# Patient Record
Sex: MALE | Race: BLACK OR AFRICAN AMERICAN | NOT HISPANIC OR LATINO | Employment: FULL TIME | URBAN - METROPOLITAN AREA
[De-identification: names, ages, dates, MRNs, and addresses within clinical notes are randomized per-mention and may not be internally consistent; named-entity substitution may affect disease eponyms.]

---

## 2017-02-27 RX ORDER — LISINOPRIL 5 MG/1
TABLET ORAL
Qty: 90 TABLET | Refills: 0 | Status: SHIPPED | OUTPATIENT
Start: 2017-02-27 | End: 2017-04-16 | Stop reason: SDUPTHER

## 2017-03-31 ENCOUNTER — OFFICE VISIT (OUTPATIENT)
Dept: OPTOMETRY | Facility: CLINIC | Age: 82
End: 2017-03-31
Payer: MEDICARE

## 2017-03-31 DIAGNOSIS — H25.13 NUCLEAR SCLEROSIS, BILATERAL: ICD-10-CM

## 2017-03-31 DIAGNOSIS — H40.013 OPEN ANGLE WITH BORDERLINE FINDINGS AND LOW GLAUCOMA RISK IN BOTH EYES: Primary | ICD-10-CM

## 2017-03-31 DIAGNOSIS — H04.123 BILATERAL DRY EYES: ICD-10-CM

## 2017-03-31 PROCEDURE — 92012 INTRM OPH EXAM EST PATIENT: CPT | Mod: S$PBB,,, | Performed by: OPTOMETRIST

## 2017-03-31 PROCEDURE — 92020 GONIOSCOPY: CPT | Mod: S$PBB,,, | Performed by: OPTOMETRIST

## 2017-03-31 PROCEDURE — 99212 OFFICE O/P EST SF 10 MIN: CPT | Mod: PBBFAC,PO | Performed by: OPTOMETRIST

## 2017-03-31 PROCEDURE — 99999 PR PBB SHADOW E&M-EST. PATIENT-LVL II: CPT | Mod: PBBFAC,,, | Performed by: OPTOMETRIST

## 2017-03-31 PROCEDURE — 92020 GONIOSCOPY: CPT | Mod: PBBFAC,PO | Performed by: OPTOMETRIST

## 2017-03-31 NOTE — PROGRESS NOTES
HPI     Mr. Brent Alatorre  Last visiti was 08/15/16 pt returns today for IOP   check  Pt happy with vision  Patient complains of having occasional sharp pains in OS only no pain   recently in the last week     (-)drops  (-)flashes  (+)floaters no recent changes   (-)diplopia    Diabetic no  No results found for: HGBA1C    OCULAR HISTORY  Last Eye Exam 08/02/16 Dr. Haque   (-)eye surgery     +Open angle with borderline findings and low glaucoma risk in both eyes  +Nuclear sclerosis, bilateral  +Bilateral dry eyes         FAMILY HISTORY  (-)Glaucoma none          Last edited by Vikram Haque, OD on 3/31/2017 11:04 AM.     ROS     Negative for: Constitutional, Gastrointestinal, Neurological, Skin,   Genitourinary, Musculoskeletal, HENT, Endocrine, Cardiovascular, Eyes,   Respiratory, Psychiatric, Allergic/Imm, Heme/Lymph    Last edited by Vikram Haque, OD on 3/31/2017  9:45 AM. (History)        Assessment /Plan     For exam results, see Encounter Report.    Open angle with borderline findings and low glaucoma risk in both eyes    Bilateral dry eyes    Nuclear sclerosis, bilateral      1. IOP stable, CD eval and nerve stable, prev vf with scattered defects, OCT with mild thinning os>od. No treat at this time. Watch os, RTC 6 mos with DFe and HVf(24-2)gladis std and OCT of RNFL.   2. Recommend artificial tears. 1 drop 1-2x per day. Chronicity of disease and treatment discussed.  3. Educated pt on presence of cataracts and effects on vision. No surgery at this time. Recheck in one year.

## 2017-04-18 RX ORDER — LISINOPRIL 5 MG/1
TABLET ORAL
Qty: 90 TABLET | Refills: 0 | Status: SHIPPED | OUTPATIENT
Start: 2017-04-18 | End: 2017-09-06 | Stop reason: SDUPTHER

## 2017-09-06 RX ORDER — LISINOPRIL 5 MG/1
TABLET ORAL
Qty: 90 TABLET | Refills: 3 | Status: SHIPPED | OUTPATIENT
Start: 2017-09-06 | End: 2018-09-08 | Stop reason: SDUPTHER

## 2017-09-06 NOTE — TELEPHONE ENCOUNTER
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    I refilled his medication for Blood Pressure

## 2017-10-16 ENCOUNTER — TELEPHONE (OUTPATIENT)
Dept: OPTOMETRY | Facility: CLINIC | Age: 82
End: 2017-10-16

## 2017-10-16 NOTE — TELEPHONE ENCOUNTER
Called to confirm appt pt confirmed reminded of appt time,date and location advised to be on time no later than 15 min after scheduled appt time advised to call [phone] to reschedule if needed.

## 2017-10-17 ENCOUNTER — CLINICAL SUPPORT (OUTPATIENT)
Dept: OPHTHALMOLOGY | Facility: CLINIC | Age: 82
End: 2017-10-17
Payer: MEDICARE

## 2017-10-17 ENCOUNTER — OFFICE VISIT (OUTPATIENT)
Dept: OPTOMETRY | Facility: CLINIC | Age: 82
End: 2017-10-17
Payer: MEDICARE

## 2017-10-17 DIAGNOSIS — H25.13 NUCLEAR SCLEROSIS, BILATERAL: ICD-10-CM

## 2017-10-17 DIAGNOSIS — H40.013 OPEN ANGLE WITH BORDERLINE FINDINGS AND LOW GLAUCOMA RISK IN BOTH EYES: Primary | ICD-10-CM

## 2017-10-17 PROCEDURE — 92014 COMPRE OPH EXAM EST PT 1/>: CPT | Mod: S$PBB,,, | Performed by: OPTOMETRIST

## 2017-10-17 PROCEDURE — 92082 INTERMEDIATE VISUAL FIELD XM: CPT | Mod: PBBFAC,PO | Performed by: OPTOMETRIST

## 2017-10-17 PROCEDURE — 99212 OFFICE O/P EST SF 10 MIN: CPT | Mod: PBBFAC,PO | Performed by: OPTOMETRIST

## 2017-10-17 PROCEDURE — 99999 PR PBB SHADOW E&M-EST. PATIENT-LVL II: CPT | Mod: PBBFAC,,, | Performed by: OPTOMETRIST

## 2017-10-17 PROCEDURE — 92133 CPTRZD OPH DX IMG PST SGM ON: CPT | Mod: PBBFAC,PO | Performed by: OPTOMETRIST

## 2017-10-17 RX ORDER — AMOXICILLIN 875 MG/1
TABLET, FILM COATED ORAL
Refills: 0 | COMMUNITY
Start: 2017-08-22 | End: 2017-11-29 | Stop reason: ALTCHOICE

## 2017-10-17 NOTE — PROGRESS NOTES
ANGELITA NUNEZ 03/2017 Here for DFE, HVF and OCT today. Not using any drops.  Will treat today if iop up or tests abnormal  No vision complaints  Wants dist spec rx    Last edited by Vikram Haque, OD on 10/17/2017  2:34 PM. (History)              Assessment /Plan     For exam results, see Encounter Report.    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 IOP stable and low, OCt normal, HVf with scattered spots peripheral ou,  Pachy thin, CD eval and nerve stable, prev vf with scattered defects,  No treat at this time. Watch os, RTC 6 mos with IOP ck and gonio.   2. Educated pt on presence of cataracts and effects on vision. No surgery at this time. Recheck in one year.

## 2017-11-29 ENCOUNTER — OFFICE VISIT (OUTPATIENT)
Dept: INTERNAL MEDICINE | Facility: CLINIC | Age: 82
End: 2017-11-29
Payer: MEDICARE

## 2017-11-29 ENCOUNTER — HOSPITAL ENCOUNTER (OUTPATIENT)
Dept: RADIOLOGY | Facility: HOSPITAL | Age: 82
Discharge: HOME | End: 2017-11-29
Attending: INTERNAL MEDICINE
Payer: MEDICARE

## 2017-11-29 VITALS
SYSTOLIC BLOOD PRESSURE: 93 MMHG | HEART RATE: 88 BPM | TEMPERATURE: 98 F | DIASTOLIC BLOOD PRESSURE: 46 MMHG | HEIGHT: 64 IN | WEIGHT: 132.25 LBS | RESPIRATION RATE: 18 BRPM | BODY MASS INDEX: 22.58 KG/M2

## 2017-11-29 DIAGNOSIS — M79.672 FOOT PAIN, LEFT: ICD-10-CM

## 2017-11-29 DIAGNOSIS — M19.042 PRIMARY OSTEOARTHRITIS OF BOTH HANDS: Primary | ICD-10-CM

## 2017-11-29 DIAGNOSIS — M19.041 PRIMARY OSTEOARTHRITIS OF BOTH HANDS: Primary | ICD-10-CM

## 2017-11-29 PROCEDURE — 99999 PR PBB SHADOW E&M-EST. PATIENT-LVL III: CPT | Mod: PBBFAC,,, | Performed by: INTERNAL MEDICINE

## 2017-11-29 PROCEDURE — 99214 OFFICE O/P EST MOD 30 MIN: CPT | Mod: S$PBB,,, | Performed by: INTERNAL MEDICINE

## 2017-11-29 PROCEDURE — 73630 X-RAY EXAM OF FOOT: CPT | Mod: TC,PO,LT

## 2017-11-29 PROCEDURE — 99213 OFFICE O/P EST LOW 20 MIN: CPT | Mod: PBBFAC,25,PO | Performed by: INTERNAL MEDICINE

## 2017-11-29 PROCEDURE — 73630 X-RAY EXAM OF FOOT: CPT | Mod: 26,LT,, | Performed by: RADIOLOGY

## 2017-11-29 NOTE — PROGRESS NOTES
Subjective:       Patient ID: Brent Alatorre is a 85 y.o. male.    Chief Complaint: Arm Pain (swelling lt side) and Foot Pain (2 lumps on bottom of foot)    HPI   Pt here for evaluation of persistent pain associated with swelling in the joints of his B/L fingers. He is a musician. Some relief with Aleve.     He is also c/o 2 painful lumps of his left heel/foot. It has been present for the last year or so. Weight bearing/walking can make it worse.   Review of Systems   Constitutional: Negative for activity change, appetite change, chills, diaphoresis, fatigue, fever and unexpected weight change.   HENT: Negative for postnasal drip, rhinorrhea, sinus pressure, sneezing, sore throat, trouble swallowing and voice change.    Respiratory: Negative for cough, shortness of breath and wheezing.    Cardiovascular: Negative for chest pain, palpitations and leg swelling.   Gastrointestinal: Negative for abdominal pain, blood in stool, constipation, diarrhea, nausea and vomiting.   Genitourinary: Negative for dysuria.   Musculoskeletal: Positive for arthralgias and joint swelling. Negative for myalgias.   Skin: Negative for rash and wound.   Allergic/Immunologic: Negative for environmental allergies and food allergies.   Hematological: Negative for adenopathy. Does not bruise/bleed easily.       Objective:      Physical Exam   Constitutional: He is oriented to person, place, and time. He appears well-developed and well-nourished. No distress.   HENT:   Head: Normocephalic and atraumatic.   Mouth/Throat: No oropharyngeal exudate.   Eyes: Conjunctivae and EOM are normal. Pupils are equal, round, and reactive to light. Right eye exhibits no discharge. Left eye exhibits no discharge. No scleral icterus.   Neck: Normal range of motion. Neck supple. No JVD present.   Cardiovascular: Normal rate, regular rhythm and normal heart sounds.    No murmur heard.  Pulmonary/Chest: Effort normal and breath sounds normal. No respiratory  distress. He has no wheezes. He has no rales.   Musculoskeletal: He exhibits no edema.        Feet:    + arthritis of hands   Lymphadenopathy:     He has no cervical adenopathy.   Neurological: He is alert and oriented to person, place, and time.   Skin: Skin is warm and dry. No rash noted. He is not diaphoretic. No pallor.       Assessment:       1. Primary osteoarthritis of both hands    2. Foot pain, left        Plan:    1. May continue NSAIDs PRN    2. X-ray foot and referral to Podiatry

## 2017-12-08 ENCOUNTER — OFFICE VISIT (OUTPATIENT)
Dept: PODIATRY | Facility: CLINIC | Age: 82
End: 2017-12-08
Payer: MEDICARE

## 2017-12-08 VITALS — BODY MASS INDEX: 22.53 KG/M2 | WEIGHT: 132 LBS | HEIGHT: 64 IN

## 2017-12-08 DIAGNOSIS — M20.5X9 HALLUX LIMITUS, UNSPECIFIED LATERALITY: ICD-10-CM

## 2017-12-08 DIAGNOSIS — M81.8 OTHER OSTEOPOROSIS WITHOUT CURRENT PATHOLOGICAL FRACTURE: ICD-10-CM

## 2017-12-08 DIAGNOSIS — L90.9 FAT PAD ATROPHY OF FOOT: ICD-10-CM

## 2017-12-08 DIAGNOSIS — M19.90 ARTHRITIS: ICD-10-CM

## 2017-12-08 DIAGNOSIS — M72.2 PLANTAR FASCIITIS: Primary | ICD-10-CM

## 2017-12-08 PROCEDURE — 99203 OFFICE O/P NEW LOW 30 MIN: CPT | Mod: S$PBB,,,

## 2017-12-08 PROCEDURE — 99999 PR PBB SHADOW E&M-EST. PATIENT-LVL III: CPT | Mod: PBBFAC,,,

## 2017-12-08 PROCEDURE — 99213 OFFICE O/P EST LOW 20 MIN: CPT | Mod: PBBFAC,PO

## 2017-12-08 NOTE — PROGRESS NOTES
Subjective:      Chief Complaint   Patient presents with    Foot Pain     demario 11/29/17       HPI: Patient presents to the clinic c/o left heel pain which is worse after a few steps after getting out of bed or resting.  Pain is described as sharp and along the plantar inner aspect of the foot.  Patient has tried new shoes and this does help. Shoegear:  tennis.      Review of Systems  Constitution: Negative for chills, fever, weakness and malaise/fatigue.   HEENT: Negative for headaches.   Cardiovascular: Negative for chest pain and claudication.   Respiratory: Negative for cough and shortness of breath.   Musculoskeletal: Positive for foot pain.  Negative for muscle cramps and muscle weakness.   Gastrointestinal: Negative for nausea and vomiting.   Neurological: Negative for numbness and paresthesias.   Dermatological: Negative for wound.         Objective:      Physical Exam  Constitutional:  Patient is oriented to person, place, and time. Vital signs are normal.  Appears well-developed and well-nourished.     Vascular:  Dorsalis pedis pulses are 2+ on the right side, and 2+ on the left side.   Posterior tibial pulses are 2+ on the right side, and 2+ on the left side.   + digital hair growth, no swelling, capillary fill time to all toes <3 seconds    Skin/Dermatological:  Skin is warm and intact. No rash noted. No cyanosis. no clubbing. No open wounds.      Musculoskeletal:      Decreased range of motion bilateral ankle and pedal joints except ankle joint dorsiflexion limited to 5 degrees with knee extended and flexed, no swelling or deformity, no tenderness or crepitus. Achilles tendon exhibits no pain or defects. Dorsal spurring b/l 1st mpjs.  Fat pad atrophy.  Tenderness to the medial calcaneal tubercles left foot , - tinel's sign, negative heel squeeze test.  Overall, pes rectus architecture with tight plantar fascia.       Neurological:  Normal strength lower extremity muscles, normal tone.   Reflex  Scores:       Patellar reflexes are 2+ on the right side and 2+ on the left side.       Achilles reflexes are 2+ on the right side and 2+ on the left side.       No deficits in 2 point tactile discrimination, vibratory, light touch or pressure      X-ray weightbearing left feet dated 11/29/17  Osteoporosis and arthritis, heel spur       Assessment:       1. Plantar fasciitis - Left Foot    2. Other osteoporosis without current pathological fracture    3. Fat pad atrophy of foot    4. Arthritis    5. Hallux limitus, unspecified laterality        Plan:       Plantar fasciitis - Left Foot    Other osteoporosis without current pathological fracture    Fat pad atrophy of foot    Arthritis    Hallux limitus, unspecified laterality       left foot:  We discussed the etiology of the problem and the patient was given literature regarding plantar fasciitis and stretching. We also discussed proper shoe gear.  Spenco orthotic supports were also recommended.  We discussed the possibility of another injection or physical therapy or surgery should this not resolve the problem. Vitamin D and Calcium supplementation. Return to clinic prn.

## 2017-12-08 NOTE — PATIENT INSTRUCTIONS
Spenco Orthotic Arch Supports                               SPENCO Orthotic Arch Supports (AKA Spenco Orthotic Insoles) are ¾ length nylon (plastic arch supports that are covered with Spencos classic green neoprene cushion material. SPENCO Orthotic Arch Supports are designed to support the medial and lateral arch. A SPENCO Orthotic Arch Support is ideal for people that need corrective support, but have tried other higher or harder orthotics and found them to be uncomfortable to wear. The nylon support of the SPENCO Orthotic Arch support is softer and more flexible than plastics used in other, harder orthotics and arch supports. This means that people with naturally lower arches or people with extremely flat feet with find that these arch supports are more comfortable to get used to than other higher arch supports.  May be obtained at:     [employer] or online

## 2017-12-08 NOTE — PROGRESS NOTES
Subjective:      Chief Complaint   Patient presents with    Foot Pain     demario 11/29/17       HPI: Patient presents to the clinic c/o {LEFT/RIGHT:67300} heel pain which is worse after a few steps after getting out of bed or resting.  Pain is described as sharp and along the plantar inner aspect of the foot.  Patient has tried *** and this does not help. Shoegear:  ***.      Review of Systems  Constitution: Negative for chills, fever, weakness and malaise/fatigue.   HEENT: Negative for headaches.   Cardiovascular: Negative for chest pain and claudication.   Respiratory: Negative for cough and shortness of breath.   Musculoskeletal: Positive for foot pain.  Negative for muscle cramps and muscle weakness.   Gastrointestinal: Negative for nausea and vomiting.   Neurological: Negative for numbness and paresthesias.   Dermatological: Negative for wound.         Objective:      Physical Exam  Constitutional:  Patient is oriented to person, place, and time. Vital signs are normal.  Appears well-developed and well-nourished.     Vascular:  Dorsalis pedis pulses are 2+ on the right side, and 2+ on the left side.   Posterior tibial pulses are 2+ on the right side, and 2+ on the left side.   + digital hair growth, no swelling, capillary fill time to all toes <3 seconds    Skin/Dermatological:  Skin is warm and intact. No rash noted. No cyanosis. no clubbing. No open wounds.      Musculoskeletal:      Normal range of motion bilateral ankle and pedal joints except ankle joint dorsiflexion limited to 5 degrees with knee extended and flexed, no swelling or deformity, no tenderness or crepitus. Achilles tendon exhibits no pain or defects.   Tenderness to the medial calcaneal tubercles{ LEFT/RIGHT:76653} foot , - tinel's sign, negative heel squeeze test.  Overall, pes *** architecture with tight plantar fascia.       Neurological:  Normal strength lower extremity muscles, normal tone.   Reflex Scores:       Patellar reflexes  are 2+ on the right side and 2+ on the left side.       Achilles reflexes are 2+ on the right side and 2+ on the left side.       No deficits in 2 point tactile discrimination, vibratory, light touch or pressure      X-ray weightbearing {LEFT/RIGHT:98211} feet dated today:***       Assessment:       1. Plantar fasciitis - Left Foot        Plan:       Plantar fasciitis - Left Foot       {LEFT/RIGHT:71716} foot:  We discussed the etiology of the problem and the patient was given literature regarding plantar fasciitis and stretching.  With the patient's permission, an injection of 12 mg of kenalog, 4 mg of dexamethasone phosphate and 1 cc of lidocaine 1% and 1 cc of marcaine 0.5% into the {LEFT/RIGHT:78873} medial plantar heel.  Patient tolerated well. We also discussed proper shoe gear.  ***Patient was dispensed an accommodative off-the-shelf insert.  Spenco orthotic supports were also recommended.  We discussed the possibility of another injection or physical therapy or surgery should this not resolve the problem.  Return to clinic 6 weeks.

## 2018-02-16 ENCOUNTER — OFFICE VISIT (OUTPATIENT)
Dept: PODIATRY | Facility: CLINIC | Age: 83
End: 2018-02-16
Payer: MEDICARE

## 2018-02-16 VITALS — WEIGHT: 132 LBS | BODY MASS INDEX: 22.53 KG/M2 | HEIGHT: 64 IN

## 2018-02-16 DIAGNOSIS — L90.9 FAT PAD ATROPHY OF FOOT: ICD-10-CM

## 2018-02-16 DIAGNOSIS — M72.2 PLANTAR FASCIITIS: Primary | ICD-10-CM

## 2018-02-16 DIAGNOSIS — M77.52 BURSITIS OF LEFT FOOT: ICD-10-CM

## 2018-02-16 PROCEDURE — 99213 OFFICE O/P EST LOW 20 MIN: CPT | Mod: S$PBB,,,

## 2018-02-16 PROCEDURE — 99999 PR PBB SHADOW E&M-EST. PATIENT-LVL II: CPT | Mod: PBBFAC,,,

## 2018-02-16 PROCEDURE — 1125F AMNT PAIN NOTED PAIN PRSNT: CPT | Mod: ,,,

## 2018-02-16 PROCEDURE — 99212 OFFICE O/P EST SF 10 MIN: CPT | Mod: PBBFAC,PO

## 2018-02-16 PROCEDURE — 1159F MED LIST DOCD IN RCRD: CPT | Mod: ,,,

## 2018-02-16 RX ORDER — METHYLPREDNISOLONE 4 MG/1
TABLET ORAL
Qty: 1 PACKAGE | Refills: 0 | Status: SHIPPED | OUTPATIENT
Start: 2018-02-16 | End: 2018-06-13 | Stop reason: ALTCHOICE

## 2018-02-16 NOTE — PROGRESS NOTES
Subjective:      Chief Complaint   Patient presents with    Foot Pain     left foot        HPI: Patient returns to the clinic reporting improvement in his left plantar fasciitis abut is still reporting mild heel pain and mild pain left 5th metatarsal base.  He states the Spenco orthotics have really helped.    Review of Systems  Constitution: Negative for chills, fever, weakness and malaise/fatigue.   HEENT: Negative for headaches.   Cardiovascular: Negative for chest pain and claudication.   Respiratory: Negative for cough and shortness of breath.   Musculoskeletal: Positive for foot pain.  Negative for muscle cramps and muscle weakness.   Gastrointestinal: Negative for nausea and vomiting.   Neurological: Negative for numbness and paresthesias.   Dermatological: Negative for wound.         Objective:      Physical Exam  Constitutional:  Patient is oriented to person, place, and time. Vital signs are normal.  Appears well-developed and well-nourished.     Vascular:  Dorsalis pedis pulses are 2+ on the right side, and 2+ on the left side.   Posterior tibial pulses are 2+ on the right side, and 2+ on the left side.   + digital hair growth, no swelling, capillary fill time to all toes <3 seconds    Skin/Dermatological:  Skin is warm and intact. No rash noted. No cyanosis. no clubbing. No open wounds.      Musculoskeletal:      Decreased range of motion bilateral ankle and pedal joints except ankle joint dorsiflexion limited to 5 degrees with knee extended and flexed, no swelling or deformity, no tenderness or crepitus. Achilles tendon exhibits no pain or defects. Dorsal spurring b/l 1st mpjs.  Fat pad atrophy.  Mild tenderness to the medial calcaneal tubercles left foot and to lateral 5th metatarsal base , - tinel's sign, negative heel squeeze test.  Overall, pes rectus architecture with tight plantar fascia.       Neurological:  Normal strength lower extremity muscles, normal tone.   Reflex Scores:       Patellar  reflexes are 2+ on the right side and 2+ on the left side.       Achilles reflexes are 2+ on the right side and 2+ on the left side.       No deficits in 2 point tactile discrimination, vibratory, light touch or pressure      X-ray weightbearing left feet dated 11/29/17  Osteoporosis and arthritis, heel spur       Assessment:       1. Plantar fasciitis - Left Foot    2. Fat pad atrophy of foot    3. Bursitis of left foot        Plan:       Plantar fasciitis - Left Foot  -     methylPREDNISolone (MEDROL DOSEPACK) 4 mg tablet; use as directed  Dispense: 1 Package; Refill: 0    Fat pad atrophy of foot    Bursitis of left foot  -     methylPREDNISolone (MEDROL DOSEPACK) 4 mg tablet; use as directed  Dispense: 1 Package; Refill: 0       left foot:  Continue stretching, icing, quality athletic shoes and Spenco orthotic supports.Medrol Dosepack.  Try Aspercreme topical pain reliever.  RTC prn.

## 2018-06-13 ENCOUNTER — TELEPHONE (OUTPATIENT)
Dept: INTERNAL MEDICINE | Facility: CLINIC | Age: 83
End: 2018-06-13

## 2018-06-13 ENCOUNTER — OFFICE VISIT (OUTPATIENT)
Dept: INTERNAL MEDICINE | Facility: CLINIC | Age: 83
End: 2018-06-13
Payer: MEDICARE

## 2018-06-13 VITALS
TEMPERATURE: 98 F | SYSTOLIC BLOOD PRESSURE: 108 MMHG | WEIGHT: 123.44 LBS | BODY MASS INDEX: 21.07 KG/M2 | HEART RATE: 60 BPM | RESPIRATION RATE: 16 BRPM | DIASTOLIC BLOOD PRESSURE: 50 MMHG | HEIGHT: 64 IN

## 2018-06-13 DIAGNOSIS — E78.5 DYSLIPIDEMIA: ICD-10-CM

## 2018-06-13 DIAGNOSIS — Z96.0 BLADDER REPLACED: ICD-10-CM

## 2018-06-13 DIAGNOSIS — M19.042 PRIMARY OSTEOARTHRITIS OF BOTH HANDS: ICD-10-CM

## 2018-06-13 DIAGNOSIS — R73.9 ELEVATED BLOOD SUGAR: ICD-10-CM

## 2018-06-13 DIAGNOSIS — R11.2 NAUSEA AND VOMITING, INTRACTABILITY OF VOMITING NOT SPECIFIED, UNSPECIFIED VOMITING TYPE: Primary | ICD-10-CM

## 2018-06-13 DIAGNOSIS — Z85.51 HISTORY OF BLADDER CANCER: ICD-10-CM

## 2018-06-13 DIAGNOSIS — I10 HTN, GOAL BELOW 130/80: Primary | ICD-10-CM

## 2018-06-13 DIAGNOSIS — M19.041 PRIMARY OSTEOARTHRITIS OF BOTH HANDS: ICD-10-CM

## 2018-06-13 PROCEDURE — 99999 PR PBB SHADOW E&M-EST. PATIENT-LVL III: CPT | Mod: PBBFAC,,, | Performed by: INTERNAL MEDICINE

## 2018-06-13 PROCEDURE — 99213 OFFICE O/P EST LOW 20 MIN: CPT | Mod: PBBFAC,PO | Performed by: INTERNAL MEDICINE

## 2018-06-13 PROCEDURE — 99214 OFFICE O/P EST MOD 30 MIN: CPT | Mod: S$PBB,,, | Performed by: INTERNAL MEDICINE

## 2018-06-13 RX ORDER — DICLOFENAC SODIUM 30 MG/G
GEL TOPICAL
Qty: 100 G | Refills: 1 | Status: SHIPPED | OUTPATIENT
Start: 2018-06-13 | End: 2018-06-29

## 2018-06-13 NOTE — PROGRESS NOTES
Subjective:       Patient ID: Brent Alatorre is a 85 y.o. male.    Chief Complaint: Abdominal Pain (tenderness. better this am.) and Vomiting (last vomited 10pm laste nite.    started vomiting on trip from [place].)    HPI   Pt here for evaluation of 2 separate episodes of N/V over the last week. The 1st time he had the episode was right after making it to [place] after eating the airplane food.  Pt then had another episode was while on the plane coming back yesterday. There was no abdominal pain associated with it, fevers/chills or diarrhea. Currently he denies any acute complaints.     Pt with OA of his hands is here requesting a medication for management. Minimal relief with Tylenol.   Review of Systems   Constitutional: Negative for activity change, appetite change, chills, diaphoresis, fatigue, fever and unexpected weight change.   HENT: Negative for postnasal drip, rhinorrhea, sinus pressure, sneezing, sore throat, trouble swallowing and voice change.    Respiratory: Negative for cough, shortness of breath and wheezing.    Cardiovascular: Negative for chest pain, palpitations and leg swelling.   Gastrointestinal: Negative for abdominal pain, blood in stool, constipation, diarrhea, nausea and vomiting.   Genitourinary: Negative for dysuria.   Musculoskeletal: Positive for arthralgias. Negative for myalgias.   Skin: Negative for rash and wound.   Allergic/Immunologic: Negative for environmental allergies and food allergies.   Hematological: Negative for adenopathy. Does not bruise/bleed easily.       Objective:      Physical Exam   Constitutional: He is oriented to person, place, and time. He appears well-developed and well-nourished. No distress.   HENT:   Head: Normocephalic and atraumatic.   Eyes: Conjunctivae and EOM are normal. Pupils are equal, round, and reactive to light. Right eye exhibits no discharge. Left eye exhibits no discharge. No scleral icterus.   Neck: Normal range of motion. Neck supple. No  JVD present.   Cardiovascular: Normal rate, regular rhythm and normal heart sounds.    No murmur heard.  Pulmonary/Chest: Effort normal and breath sounds normal. No respiratory distress. He has no wheezes. He has no rales.   Abdominal: Soft. Bowel sounds are normal. There is no tenderness.   Musculoskeletal: He exhibits tenderness (B/L hands/fingers). He exhibits no edema.   Lymphadenopathy:     He has no cervical adenopathy.   Neurological: He is alert and oriented to person, place, and time.   Skin: Skin is warm and dry. No rash noted. He is not diaphoretic. No pallor.       Assessment:       1. Nausea and vomiting, intractability of vomiting not specified, unspecified vomiting type    2. Primary osteoarthritis of both hands    3. History of bladder cancer    4. Bladder replaced        Plan:    1. Resolved, unclear etiology       Call if symptoms return for formal evaluation    2. Rx Diclofenac gel PRN   3/4. Referral to Urology

## 2018-06-19 ENCOUNTER — LAB VISIT (OUTPATIENT)
Dept: LAB | Facility: HOSPITAL | Age: 83
End: 2018-06-19
Attending: INTERNAL MEDICINE
Payer: MEDICARE

## 2018-06-19 DIAGNOSIS — E78.5 DYSLIPIDEMIA: ICD-10-CM

## 2018-06-19 DIAGNOSIS — I10 HTN, GOAL BELOW 130/80: ICD-10-CM

## 2018-06-19 DIAGNOSIS — R73.9 ELEVATED BLOOD SUGAR: ICD-10-CM

## 2018-06-19 LAB
ALBUMIN SERPL BCP-MCNC: 3.2 G/DL
ALP SERPL-CCNC: 102 U/L
ALT SERPL W/O P-5'-P-CCNC: 12 U/L
ANION GAP SERPL CALC-SCNC: 4 MMOL/L
AST SERPL-CCNC: 15 U/L
BASOPHILS # BLD AUTO: 0.02 K/UL
BASOPHILS NFR BLD: 0.6 %
BILIRUB SERPL-MCNC: 0.5 MG/DL
BUN SERPL-MCNC: 19 MG/DL
CALCIUM SERPL-MCNC: 9.1 MG/DL
CHLORIDE SERPL-SCNC: 116 MMOL/L
CHOLEST SERPL-MCNC: 149 MG/DL
CHOLEST/HDLC SERPL: 2.3 {RATIO}
CO2 SERPL-SCNC: 21 MMOL/L
CREAT SERPL-MCNC: 1.2 MG/DL
DIFFERENTIAL METHOD: ABNORMAL
EOSINOPHIL # BLD AUTO: 0.1 K/UL
EOSINOPHIL NFR BLD: 4.1 %
ERYTHROCYTE [DISTWIDTH] IN BLOOD BY AUTOMATED COUNT: 14.9 %
EST. GFR  (AFRICAN AMERICAN): >60 ML/MIN/1.73 M^2
EST. GFR  (NON AFRICAN AMERICAN): 54.8 ML/MIN/1.73 M^2
ESTIMATED AVG GLUCOSE: 114 MG/DL
GLUCOSE SERPL-MCNC: 87 MG/DL
HBA1C MFR BLD HPLC: 5.6 %
HCT VFR BLD AUTO: 34.4 %
HDLC SERPL-MCNC: 65 MG/DL
HDLC SERPL: 43.6 %
HGB BLD-MCNC: 10.9 G/DL
IMM GRANULOCYTES # BLD AUTO: 0 K/UL
IMM GRANULOCYTES NFR BLD AUTO: 0 %
LDLC SERPL CALC-MCNC: 75.4 MG/DL
LYMPHOCYTES # BLD AUTO: 1.2 K/UL
LYMPHOCYTES NFR BLD: 37.8 %
MCH RBC QN AUTO: 29.9 PG
MCHC RBC AUTO-ENTMCNC: 31.7 G/DL
MCV RBC AUTO: 94 FL
MONOCYTES # BLD AUTO: 0.4 K/UL
MONOCYTES NFR BLD: 13.8 %
NEUTROPHILS # BLD AUTO: 1.4 K/UL
NEUTROPHILS NFR BLD: 43.7 %
NONHDLC SERPL-MCNC: 84 MG/DL
NRBC BLD-RTO: 0 /100 WBC
PLATELET # BLD AUTO: 229 K/UL
PMV BLD AUTO: 9.8 FL
POTASSIUM SERPL-SCNC: 4.8 MMOL/L
PROT SERPL-MCNC: 6.9 G/DL
RBC # BLD AUTO: 3.65 M/UL
SODIUM SERPL-SCNC: 141 MMOL/L
TRIGL SERPL-MCNC: 43 MG/DL
TSH SERPL DL<=0.005 MIU/L-ACNC: 1.26 UIU/ML
WBC # BLD AUTO: 3.2 K/UL

## 2018-06-19 PROCEDURE — 36415 COLL VENOUS BLD VENIPUNCTURE: CPT | Mod: PO

## 2018-06-19 PROCEDURE — 85025 COMPLETE CBC W/AUTO DIFF WBC: CPT

## 2018-06-19 PROCEDURE — 84443 ASSAY THYROID STIM HORMONE: CPT

## 2018-06-19 PROCEDURE — 80053 COMPREHEN METABOLIC PANEL: CPT

## 2018-06-19 PROCEDURE — 80061 LIPID PANEL: CPT

## 2018-06-19 PROCEDURE — 83036 HEMOGLOBIN GLYCOSYLATED A1C: CPT

## 2018-06-26 ENCOUNTER — OFFICE VISIT (OUTPATIENT)
Dept: INTERNAL MEDICINE | Facility: CLINIC | Age: 83
End: 2018-06-26
Payer: MEDICARE

## 2018-06-26 VITALS
RESPIRATION RATE: 18 BRPM | SYSTOLIC BLOOD PRESSURE: 134 MMHG | HEIGHT: 64 IN | BODY MASS INDEX: 22.17 KG/M2 | DIASTOLIC BLOOD PRESSURE: 70 MMHG | TEMPERATURE: 98 F | WEIGHT: 129.88 LBS | HEART RATE: 65 BPM

## 2018-06-26 DIAGNOSIS — D64.9 ANEMIA, UNSPECIFIED TYPE: ICD-10-CM

## 2018-06-26 DIAGNOSIS — N39.41 URGE INCONTINENCE OF URINE: ICD-10-CM

## 2018-06-26 DIAGNOSIS — Z00.00 ANNUAL PHYSICAL EXAM: ICD-10-CM

## 2018-06-26 DIAGNOSIS — D72.819 LEUKOPENIA, UNSPECIFIED TYPE: ICD-10-CM

## 2018-06-26 DIAGNOSIS — I10 ESSENTIAL HYPERTENSION: Primary | ICD-10-CM

## 2018-06-26 DIAGNOSIS — M89.9 DISORDER OF BONE: ICD-10-CM

## 2018-06-26 PROCEDURE — 99213 OFFICE O/P EST LOW 20 MIN: CPT | Mod: PBBFAC,PO,25 | Performed by: INTERNAL MEDICINE

## 2018-06-26 PROCEDURE — G0009 ADMIN PNEUMOCOCCAL VACCINE: HCPCS | Mod: PBBFAC,PO

## 2018-06-26 PROCEDURE — 99215 OFFICE O/P EST HI 40 MIN: CPT | Mod: S$PBB,,, | Performed by: INTERNAL MEDICINE

## 2018-06-26 PROCEDURE — 90714 TD VACC NO PRESV 7 YRS+ IM: CPT | Mod: PBBFAC,PO

## 2018-06-26 PROCEDURE — 99999 PR PBB SHADOW E&M-EST. PATIENT-LVL III: CPT | Mod: PBBFAC,,, | Performed by: INTERNAL MEDICINE

## 2018-06-26 NOTE — PROGRESS NOTES
Subjective:       Patient ID: Brent Alatorre is a 85 y.o. male.    Chief Complaint: Annual Exam    HPI   85 y.o. Male here for annual exam.     Cholesterol: (normal)  Vaccines: Influenza (2017); Tetanus (2018); Prevnar (2018); Zoster (next visit)  Eye exam: 2018  Colonoscopy: declined  DEXA: needs    Mobility: normal  Falls: no    Exercise: walks daily   Diet: regular    Past Medical History:  No date: Anemia  No date: Bladder cancer  No date: Cancer      Comment: bladder  No date: Cataracts, bilateral  No date: Colon polyp  No date: History of kidney problems  No date: Hypertension  11/29/2017: Primary osteoarthritis of both hands  Past Surgical History:  1995: BLADDER AUGMENTATION      Comment: bladder cancer removal and colon resection                prostatectomy   No date: BLADDER SURGERY      Comment: cystectomy  1995: COLON SURGERY  No date: CYSTOSCOPY  No date: HERNIA REPAIR  1995: PROSTATE SURGERY  Social History    Marital status:              Spouse name: Magui                Years of education:                 Number of children:               Occupational History  Occupation          Employer            Comment               Retired                                     Social History Main Topics    Smoking status: Former Smoker                                                                Packs/day: 0.00      Years: 0.00        Smokeless tobacco: Never Used                        Comment: does marijuana    Alcohol use: Yes           1.8 oz/week       Cans of beer: 3 per week       Comment: very light drinker    Drug use: Yes           Frequency: 4.0 times per week       Types: Marijuana       Comment: Every day    Sexual activity: Not on file          Review of patient's allergies indicates:  No Known Allergies  Mr. Alatorre had no medications administered during this visit.  '  Review of Systems   Constitutional: Negative for activity change, appetite change, chills, diaphoresis, fatigue,  fever and unexpected weight change.   HENT: Negative for congestion, mouth sores, postnasal drip, rhinorrhea, sinus pressure, sneezing, sore throat, trouble swallowing and voice change.    Eyes: Negative for discharge, itching and visual disturbance.   Respiratory: Negative for cough, chest tightness, shortness of breath and wheezing.    Cardiovascular: Negative for chest pain, palpitations and leg swelling.   Gastrointestinal: Negative for abdominal pain, blood in stool, constipation, diarrhea, nausea and vomiting.   Endocrine: Negative for cold intolerance and heat intolerance.   Genitourinary: Positive for urgency. Negative for difficulty urinating, dysuria, flank pain and hematuria.   Musculoskeletal: Negative for arthralgias, back pain, myalgias and neck pain.   Skin: Negative for rash and wound.   Allergic/Immunologic: Negative for environmental allergies and food allergies.   Neurological: Negative for dizziness, tremors, seizures, syncope, weakness and headaches.   Hematological: Negative for adenopathy. Does not bruise/bleed easily.   Psychiatric/Behavioral: Negative for confusion, sleep disturbance and suicidal ideas. The patient is not nervous/anxious.        Objective:      Physical Exam   Constitutional: He is oriented to person, place, and time. He appears well-developed and well-nourished. No distress.   HENT:   Head: Normocephalic and atraumatic.   Right Ear: External ear normal.   Left Ear: External ear normal.   Nose: Nose normal.   Mouth/Throat: Oropharynx is clear and moist. No oropharyngeal exudate.   Eyes: Conjunctivae and EOM are normal. Pupils are equal, round, and reactive to light. Right eye exhibits no discharge. Left eye exhibits no discharge. No scleral icterus.   Neck: Normal range of motion. Neck supple. No JVD present. No thyromegaly present.   Cardiovascular: Normal rate, regular rhythm, normal heart sounds and intact distal pulses.    No murmur heard.  Pulmonary/Chest: Effort normal  and breath sounds normal. No respiratory distress. He has no wheezes. He has no rales.   Abdominal: Soft. Bowel sounds are normal. He exhibits no distension. There is no tenderness. There is no guarding.   Musculoskeletal: He exhibits no edema.   Lymphadenopathy:     He has no cervical adenopathy.   Neurological: He is alert and oriented to person, place, and time. No cranial nerve deficit. Coordination normal.   Skin: Skin is warm and dry. No rash noted. He is not diaphoretic. No pallor.   Psychiatric: He has a normal mood and affect. Judgment normal.       Assessment:       1. Essential hypertension    2. Anemia, unspecified type    3. Leukopenia, unspecified type    4. Urge incontinence of urine    5. Disorder of bone    6. Annual physical exam        Plan:    1. HTN- stable on Lisinopril 5 mg daily    2. NC/NC Anemia- stable   3. Leukopenia- stable, will follow   4. Urge incontinence- stable on Vesicare   5. DEXA ordered    6. Annual- labs reviewed with pt       Vaccines: Influenza (2017); Tetanus (2018); Prevnar (2018); Zoster (next visit)       Eye exam: 2018       Colonoscopy: declined        DEXA: needs   7. F/u in 6 months    Over 1/2 of 40 minute visit spent reviewing pt's medical records, education/discussion of pt's medical conditions and medical management

## 2018-06-28 ENCOUNTER — OFFICE VISIT (OUTPATIENT)
Dept: OPTOMETRY | Facility: CLINIC | Age: 83
End: 2018-06-28
Payer: MEDICARE

## 2018-06-28 DIAGNOSIS — H25.13 NUCLEAR SCLEROSIS, BILATERAL: ICD-10-CM

## 2018-06-28 DIAGNOSIS — H40.013 OPEN ANGLE WITH BORDERLINE FINDINGS AND LOW GLAUCOMA RISK IN BOTH EYES: Primary | ICD-10-CM

## 2018-06-28 DIAGNOSIS — H52.4 PRESBYOPIA: ICD-10-CM

## 2018-06-28 PROCEDURE — 99212 OFFICE O/P EST SF 10 MIN: CPT | Mod: PBBFAC,PO | Performed by: OPTOMETRIST

## 2018-06-28 PROCEDURE — 92020 GONIOSCOPY: CPT | Mod: PBBFAC,PO | Performed by: OPTOMETRIST

## 2018-06-28 PROCEDURE — 92014 COMPRE OPH EXAM EST PT 1/>: CPT | Mod: S$PBB,,, | Performed by: OPTOMETRIST

## 2018-06-28 PROCEDURE — 99999 PR PBB SHADOW E&M-EST. PATIENT-LVL II: CPT | Mod: PBBFAC,,, | Performed by: OPTOMETRIST

## 2018-06-28 PROCEDURE — 92020 GONIOSCOPY: CPT | Mod: S$PBB,,, | Performed by: OPTOMETRIST

## 2018-06-28 PROCEDURE — 92015 DETERMINE REFRACTIVE STATE: CPT | Mod: ,,, | Performed by: OPTOMETRIST

## 2018-06-28 NOTE — PROGRESS NOTES
HPI     DLS: 10/17/2017 with Dr. Haque  Pt states no va changes   Denies f/f    No gtts     CAT OU   Glauc Susp by CDs    Last edited by Ran Mcgraw, OD on 6/28/2018  7:58 AM. (History)        ROS     Positive for: Eyes (glauc susp by CDs)    Negative for: Constitutional, Gastrointestinal, Neurological, Skin,   Genitourinary, Musculoskeletal, HENT, Endocrine, Cardiovascular,   Respiratory, Psychiatric, Allergic/Imm, Heme/Lymph    Last edited by Ran Mcgraw, OD on 6/28/2018  7:58 AM. (History)        Assessment /Plan     For exam results, see Encounter Report.    Open angle with borderline findings and low glaucoma risk in both eyes  -     Juarez Visual Field - OU - Extended - Both Eyes; Future; Expected date: 06/28/2019  -     Posterior Segment OCT Optic Nerve- Both eyes; Future; Expected date: 06/28/2019    Nuclear sclerosis, bilateral    Presbyopia      1. Cat OU--pt happy w otc readers  2. glauc susp by CDs--followed in past by Dr Haque.  iop wnl without meds.  Last VF wnl.  - fam hx.  Pach: 517/516.  Angles open by gonio OU.  Doubt glauc now--will monitor    PLAN:    rtc 1 yr: HVF 24-2 std/OCT/full

## 2018-06-29 ENCOUNTER — OFFICE VISIT (OUTPATIENT)
Dept: UROLOGY | Facility: CLINIC | Age: 83
End: 2018-06-29
Payer: MEDICARE

## 2018-06-29 ENCOUNTER — APPOINTMENT (OUTPATIENT)
Dept: RADIOLOGY | Facility: CLINIC | Age: 83
End: 2018-06-29
Attending: INTERNAL MEDICINE
Payer: MEDICARE

## 2018-06-29 VITALS
HEIGHT: 64 IN | DIASTOLIC BLOOD PRESSURE: 81 MMHG | HEART RATE: 90 BPM | SYSTOLIC BLOOD PRESSURE: 147 MMHG | WEIGHT: 128.06 LBS | BODY MASS INDEX: 21.86 KG/M2

## 2018-06-29 DIAGNOSIS — M89.9 DISORDER OF BONE: ICD-10-CM

## 2018-06-29 DIAGNOSIS — R39.198 DIFFICULTY URINATING: ICD-10-CM

## 2018-06-29 LAB
BILIRUB SERPL-MCNC: ABNORMAL MG/DL
BLOOD URINE, POC: 50
COLOR, POC UA: YELLOW
GLUCOSE UR QL STRIP: ABNORMAL
KETONES UR QL STRIP: ABNORMAL
LEUKOCYTE ESTERASE URINE, POC: ABNORMAL
NITRITE, POC UA: ABNORMAL
PH, POC UA: 7
POC RESIDUAL URINE VOLUME: 10 ML (ref 0–100)
PROTEIN, POC: ABNORMAL
SPECIFIC GRAVITY, POC UA: 1
UROBILINOGEN, POC UA: ABNORMAL

## 2018-06-29 PROCEDURE — 99214 OFFICE O/P EST MOD 30 MIN: CPT | Mod: S$PBB,,, | Performed by: UROLOGY

## 2018-06-29 PROCEDURE — 51798 US URINE CAPACITY MEASURE: CPT | Mod: PBBFAC,PO | Performed by: UROLOGY

## 2018-06-29 PROCEDURE — 77080 DXA BONE DENSITY AXIAL: CPT | Mod: 26,,, | Performed by: INTERNAL MEDICINE

## 2018-06-29 PROCEDURE — 77080 DXA BONE DENSITY AXIAL: CPT | Mod: TC,PO

## 2018-06-29 PROCEDURE — 99213 OFFICE O/P EST LOW 20 MIN: CPT | Mod: PBBFAC,PO | Performed by: UROLOGY

## 2018-06-29 PROCEDURE — 99999 PR PBB SHADOW E&M-EST. PATIENT-LVL III: CPT | Mod: PBBFAC,,, | Performed by: UROLOGY

## 2018-06-29 PROCEDURE — 81002 URINALYSIS NONAUTO W/O SCOPE: CPT | Mod: PBBFAC,PO | Performed by: UROLOGY

## 2018-06-29 RX ORDER — SOLIFENACIN SUCCINATE 10 MG/1
10 TABLET, FILM COATED ORAL DAILY
Qty: 90 TABLET | Refills: 3 | Status: SHIPPED | OUTPATIENT
Start: 2018-06-29 | End: 2021-05-01 | Stop reason: SDUPTHER

## 2018-06-29 NOTE — LETTER
June 29, 2018      Ran Escalera,   [address] [facility] [place] [address]           [place] [facility]  [address] [facility] [address]  Phone: [phone]          Patient: Brent Alatorre   MR Number: [id number]   YOB: 1932   Date of Visit: 6/29/2018       Dear Dr. Ran Escalera:    Thank you for referring Brent Alatorre to me for evaluation. Attached you will find relevant portions of my assessment and plan of care.    If you have questions, please do not hesitate to call me. I look forward to following Brent Alatorre along with you.    Sincerely,    Nabeel Robertson MD    Enclosure  CC:  No Recipients    If you would like to receive this communication electronically, please contact [email or web address] or [phone] to request more information on [facility] Link access.    For providers and/or their staff who would like to refer a patient to [facility], please contact us through our one-stop-shop provider referral line, [facility], at [phone].    If you feel you have received this communication in error or would no longer like to receive these types of communications, please e-mail [email or web address]

## 2018-06-29 NOTE — PROGRESS NOTES
"Subjective:      Brent Alatorre is a 85 y.o. male who was referred by Ran Escalera DO for evaluation of urinary incontinence.      He has PMH of [place] bladder cancer s/p cystectomy w/ Studor neobladder (in [place]) in 1995. He was last seen at [facility] in 2015 with same c/o urinary incontinence. Dr. Hoyt did SUDS/cysto at that time, which demonstrated regular spasms in the neobladder. Notes at that time say his leakage had improved w/ Vesicare, but it doesn't appear this has been prescribed since 2016.     He states he was having UUI again earlier this year, however he restarted the vesicare a few weeks ago and symptom has resolved. He has no urinary c/o today.    The following portions of the patient's history were reviewed and updated as appropriate: allergies, current medications, past family history, past medical history, past social history, past surgical history and problem list.    Review of Systems  Constitutional: no fever or chills  ENT: no nasal congestion or sore throat  Respiratory: no cough or shortness of breath  Cardiovascular: no chest pain or palpitations  Gastrointestinal: no nausea or vomiting, tolerating diet  Genitourinary: as per HPI  Hematologic/Lymphatic: no easy bruising or lymphadenopathy  Musculoskeletal: no arthralgias or myalgias  Neurological: no seizures or tremors  Behavioral/Psych: no auditory or visual hallucinations     Objective:   Vitals: BP (!) 147/81 (BP Location: Left arm, Patient Position: Sitting, BP Method: Medium (Automatic))   Pulse 90   Ht 5' 4" (1.626 m)   Wt 58.1 kg (128 lb 1.4 oz)   BMI 21.99 kg/m²     Physical Exam   General: alert and oriented, no acute distress  Head: normocephalic, atraumatic  Neck: supple, no lymphadenopathy, normal ROM, no masses  Respiratory: Symmetric expansion, non-labored breathing  Cardiovascular: regular rate and rhythm, nomal pulses, no peripheral edema  Skin: normal coloration and turgor, no rashes, no suspicious skin lesions " noted  Neuro: alert and oriented x3, no gross deficits  Psych: normal judgment and insight, normal mood/affect and non-anxious    Lab Review   Urinalysis demonstrates positive for leukocytes, red blood cells  Lab Results   Component Value Date    WBC 3.20 (L) 06/19/2018    HGB 10.9 (L) 06/19/2018    HCT 34.4 (L) 06/19/2018    MCV 94 06/19/2018     06/19/2018     Lab Results   Component Value Date    CREATININE 1.2 06/19/2018    BUN 19 06/19/2018       Assessment:   No diagnosis found.    Plan:   1. Will defer workup w/ SUDS/cysto as this is unlikely to have changed since it was done in 2015  2. Continue vesicare 10  3. FU 12 mos

## 2018-07-17 PROBLEM — M81.0 AGE-RELATED OSTEOPOROSIS WITHOUT CURRENT PATHOLOGICAL FRACTURE: Status: ACTIVE | Noted: 2018-07-17

## 2018-09-08 RX ORDER — LISINOPRIL 5 MG/1
TABLET ORAL
Qty: 90 TABLET | Refills: 0 | Status: SHIPPED | OUTPATIENT
Start: 2018-09-08 | End: 2019-03-19 | Stop reason: SDUPTHER

## 2019-02-24 ENCOUNTER — OFFICE VISIT (OUTPATIENT)
Dept: URGENT CARE | Facility: CLINIC | Age: 84
End: 2019-02-24
Payer: MEDICARE

## 2019-02-24 VITALS
DIASTOLIC BLOOD PRESSURE: 73 MMHG | HEIGHT: 64 IN | OXYGEN SATURATION: 97 % | BODY MASS INDEX: 21.85 KG/M2 | RESPIRATION RATE: 16 BRPM | HEART RATE: 82 BPM | SYSTOLIC BLOOD PRESSURE: 126 MMHG | WEIGHT: 128 LBS | TEMPERATURE: 97 F

## 2019-02-24 DIAGNOSIS — S63.501A SPRAIN OF RIGHT WRIST, INITIAL ENCOUNTER: ICD-10-CM

## 2019-02-24 DIAGNOSIS — T14.8XXA ABRASION: ICD-10-CM
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
## 2019-02-24 DIAGNOSIS — S09.8XXA BLUNT HEAD TRAUMA, INITIAL ENCOUNTER: ICD-10-CM

## 2019-02-24 DIAGNOSIS — S00.83XA FACIAL CONTUSION, INITIAL ENCOUNTER: ICD-10-CM

## 2019-02-24 PROCEDURE — 99214 OFFICE O/P EST MOD 30 MIN: CPT | Mod: S$GLB,,, | Performed by: EMERGENCY MEDICINE

## 2019-02-24 PROCEDURE — 73130 X-RAY EXAM OF HAND: CPT | Mod: RT,S$GLB,, | Performed by: RADIOLOGY

## 2019-02-24 PROCEDURE — 99214 PR OFFICE/OUTPT VISIT, EST, LEVL IV, 30-39 MIN: ICD-10-PCS | Mod: S$GLB,,, | Performed by: EMERGENCY MEDICINE

## 2019-02-24 PROCEDURE — 73130 XR HAND COMPLETE 3 VIEW RIGHT: ICD-10-PCS | Mod: RT,S$GLB,, | Performed by: RADIOLOGY

## 2019-02-24 PROCEDURE — 73030 XR SHOULDER TRAUMA 3 VIEW RIGHT: ICD-10-PCS | Mod: RT,S$GLB,, | Performed by: RADIOLOGY

## 2019-02-24 PROCEDURE — 73030 X-RAY EXAM OF SHOULDER: CPT | Mod: RT,S$GLB,, | Performed by: RADIOLOGY

## 2019-02-24 RX ORDER — HYDROCODONE BITARTRATE AND ACETAMINOPHEN 5; 325 MG/1; MG/1
1 TABLET ORAL EVERY 6 HOURS PRN
Qty: 9 TABLET | Refills: 0 | Status: SHIPPED | OUTPATIENT
Start: 2019-02-24 | End: 2021-01-19

## 2019-02-24 NOTE — PROGRESS NOTES
"Subjective:       Patient ID: Brent Alatorre is a 86 y.o. male.    Vitals:    02/24/19 1108   BP: 126/73   Pulse: 82   Resp: 16   Temp: 97 °F (36.1 °C)   TempSrc: Oral   SpO2: 97%   Weight: 58.1 kg (128 lb)   Height: 5' 4" (1.626 m)       Chief Complaint: Wrist Injury (right wrist )    Pt states Friday he was [place] [facility] and fell. Pt states he hit is head, right shoulder, right wrist. Pt states no LOC. EMS did come out and check him out. Pt also hit eye and has right eye swelling.       Wrist Injury    The incident occurred 2 days ago. Incident location: [facility]  The injury mechanism was a fall. The pain is present in the right shoulder and right wrist. The pain does not radiate. The pain is at a severity of 5/10. The pain is moderate. The pain has been constant since the incident. Associated symptoms include tingling. Pertinent negatives include no chest pain.     Review of Systems   Constitution: Negative for chills and fever.   HENT: Negative for sore throat.    Eyes: Negative for blurred vision.   Cardiovascular: Negative for chest pain.   Respiratory: Negative for shortness of breath.    Skin: Negative for rash.   Musculoskeletal: Positive for falls. Negative for back pain and joint pain.   Gastrointestinal: Negative for abdominal pain, diarrhea, nausea and vomiting.   Neurological: Positive for tingling. Negative for headaches.   Psychiatric/Behavioral: The patient is not nervous/anxious.        Objective:      Physical Exam   Constitutional: He is oriented to person, place, and time. He appears well-developed and well-nourished. He is cooperative.  Non-toxic appearance. He does not appear ill. No distress.   HENT:   Head: Normocephalic. Head is with abrasion and with contusion. Head is without laceration.       Right Ear: Hearing, tympanic membrane, external ear and ear canal normal. No hemotympanum.   Left Ear: Hearing, tympanic membrane, external ear and ear canal normal. No hemotympanum. "   Nose: Nose normal. No mucosal edema, rhinorrhea or nasal deformity. No epistaxis. Right sinus exhibits no maxillary sinus tenderness and no frontal sinus tenderness. Left sinus exhibits no maxillary sinus tenderness and no frontal sinus tenderness.   Mouth/Throat: Uvula is midline, oropharynx is clear and moist and mucous membranes are normal. No trismus in the jaw. Normal dentition. No uvula swelling. No posterior oropharyngeal erythema.   Patient has a contusion/area of ecchymosis to the right eyebrow and periorbital region.  There is minimal tenderness there is no point bony tenderness to the zygoma there is no deformity, patient's globe is atraumatic extraocular muscles are intact   Eyes: Conjunctivae, EOM and lids are normal. Pupils are equal, round, and reactive to light. Right eye exhibits no discharge. Left eye exhibits no discharge. No scleral icterus.   Sclera clear bilat   Neck: Trachea normal, normal range of motion, full passive range of motion without pain and phonation normal. Neck supple. No spinous process tenderness and no muscular tenderness present. No neck rigidity. No tracheal deviation present.   Cardiovascular: Normal rate, regular rhythm, normal heart sounds, intact distal pulses and normal pulses.   Pulmonary/Chest: Effort normal and breath sounds normal. No respiratory distress.   Abdominal: Soft. Normal appearance and bowel sounds are normal. He exhibits no distension, no pulsatile midline mass and no mass. There is no tenderness.   Musculoskeletal: He exhibits no edema or deformity.        Right shoulder: He exhibits decreased range of motion, pain and spasm. He exhibits no tenderness, no bony tenderness, no swelling, no effusion, no crepitus and no deformity.        Right elbow: He exhibits normal range of motion, no swelling, no effusion, no deformity and no laceration.        Right wrist: He exhibits decreased range of motion, tenderness, bony tenderness and swelling. He exhibits no  deformity and no laceration.   Neuro-vascularly intact distal to extremity     Neurological: He is alert and oriented to person, place, and time. He has normal strength. No cranial nerve deficit or sensory deficit. He exhibits normal muscle tone. He displays no seizure activity. Coordination normal. GCS eye subscore is 4. GCS verbal subscore is 5. GCS motor subscore is 6.   Skin: Skin is warm, dry and intact. Capillary refill takes less than 2 seconds. No abrasion, no bruising, no burn, no ecchymosis and no laceration noted. He is not diaphoretic. No pallor.   Psychiatric: He has a normal mood and affect. His speech is normal and behavior is normal. Judgment and thought content normal. Cognition and memory are normal.   Nursing note and vitals reviewed.      Assessment:       1. Fall on same level, initial encounter    2. Blunt head trauma, initial encounter    3. Facial contusion, initial encounter    4. Abrasion    5. Sprain of right wrist, initial encounter        Plan:       Brent was seen today for wrist injury.    Diagnoses and all orders for this visit:    Fall on same level, initial encounter  -     X-Ray Hand 3 view Right; Future  -     X-Ray Shoulder Trauma 3 view Right; Future    Blunt head trauma, initial encounter    Facial contusion, initial encounter    Abrasion    Sprain of right wrist, initial encounter  -     WRIST BRACE FOR HOME USE    Other orders  -     HYDROcodone-acetaminophen (NORCO) 5-325 mg per tablet; Take 1 tablet by mouth every 6 (six) hours as needed for Pain. Please start with 1/2 tablet every 6 hours for severe pain ONLY        X-ray Hand 3 View Right    Result Date: 2/24/2019  EXAMINATION: XR HAND COMPLETE 3 VIEW RIGHT CLINICAL HISTORY: Fall on same level, unspecified, initial encounter TECHNIQUE: PA, lateral, and oblique views of the right hand were performed. COMPARISON: None FINDINGS: Three views right hand. Degenerative changes are noted of the hand and wrist.  There is  vascular calcification.  There appears to be some edema about the dorsal aspect of the hand.     1. No convincing acute displaced fracture or dislocation of the hand noting degenerative changes and mild dorsal edema. Electronically signed by: Clay De Luna MD Date:    02/24/2019 Time:    12:00    X-ray Shoulder Trauma 3 View Right    Result Date: 2/24/2019  EXAMINATION: XR SHOULDER TRAUMA 3 VIEW RIGHT CLINICAL HISTORY: Fall on same level, unspecified, initial encounter TECHNIQUE: Three or four views of the right shoulder were performed. COMPARISON: 08/10/2016 FINDINGS: Four views. The right humeral head maintains appropriate relationship with the glenoid.  Degenerative changes are noted of the glenohumeral joint and acromioclavicular joint.  The acromioclavicular joint is intact.  No acute displaced right rib fracture.  The right lung zones are grossly clear.     1. No acute displaced fracture or dislocation of the shoulder noting degenerative changes. Electronically signed by: Clay De Luna MD Date:    02/24/2019 Time:    11:59      Medical decision making:  Patient 48 hr after a trip and fall blunt head trauma and facial contusion.  Patient also with complaints of right wrist and shoulder pain.  Moderate swelling and pain with movement of his right wrist and hand.  No deformity seen.  Patient neurovascularly intact on examination. Patient denies neurologic symptoms or severe headache or vomiting. Testing in clinic included x-rays which were negative for acute fracture but showed significant degenerative arthritis changes.  Patient will be placed in a Velcro splint to his right wrist.  Patient is instructed to take Tylenol for pain. He is given a limited supply of hydrocodone for severe pain only for the next 2 days.  He is directed to ice his affected areas.  Discussed head injury instructions with patient and family and advised transport to hospital should symptoms worsen.  Given the time from the original  injury in his current exam status I feel that that is less likely even considering his age. Patient and family understand these instructions and agree with this plan.    Patient Instructions       Go to the Emergency Room if symptoms or condition worsens in any way    Follow up with   Orthopedist    in 5-7 days if not improved  [phone]    Facial Contusion  A contusion is another word for a bruise. It happens when small blood vessels break open and leak blood into the nearby area. A facial contusion can result from a bump, hit, or fall. This may happen during sports or an accident. Symptoms of a contusion often include changes in skin color (bruising), swelling, and pain.   The swelling from the contusion should decrease in a few days. Bruising and pain may take several weeks to go away.   Home care  · If you have been prescribed medicines for pain, take them as directed.  · To help reduce swelling and pain, wrap a cold pack or bag of frozen peas in a thin towel. Put it on the injured area for up to 20 minutes. Do this a few times a day until the swelling goes down.   · If you have scrapes or cuts on your face requiring stiches or other closures, care for them as directed.  · For the next 24 hours (or longer if instructed):  ¨ Dont drink alcohol, or use sedatives or medicines that make you sleepy.  ¨ Dont drive or operate machinery.  ¨ Avoid doing anything strenuous. Dont lift or strain.  ¨ Do not return to sports or other activity that could result in another head injury.  Note about concussion  Because the injury was to your head, it is possible that a concussion (mild brain injury) could result. You don't have signs of a concussion at this time. But symptoms can show up later. Be alert for signs and symptoms of a concussion. Seek emergency medical care if any of these develop over the next hours to days:  · Headache  · Nausea or vomiting  · Dizziness  · Sensitivity to light or noise  · Unusual sleepiness or  grogginess  · Trouble falling asleep  · Personality changes  · Vision changes  · Memory loss  · Confusion  · Trouble walking or clumsiness  · Loss of consciousness (even for a short time)  · Inability to be awakened   Follow-up care  Follow up with your healthcare provider or our staff as directed.  When to seek medical advice  Call your healthcare provider right away if any of these occur:  · Swelling or pain that gets worse, not better  · New swelling or pain  · Warmth or drainage from the swollen area or from cuts or scrapes  · Fluid drainage or bleeding from the nose or ears  · Fever of 100.4ºF (38ºC) or higher, or as directed by your healthcare provider  Date Last Reviewed: 5/7/2015 © 2000-2017 [employer]. [address] [place] [address], [place] [address]. All rights reserved. This information is not intended as a substitute for professional medical care. Always follow your healthcare professional's instructions.        Head Injury (Adult)    You have a head injury. It does not appear serious at this time. But symptoms of a more serious problem, such as a mild brain injury (concussion) or bruising or bleeding in the brain, may appear later. For this reason, you or someone caring for you will need to watch for the symptoms listed below. Once youre home, also be sure to follow any care instructions youre given.  Home care  Watch for the following symptoms  Seek emergency medical care if you have any of these symptoms over the next hours to days:   · Headache  · Nausea or vomiting  · Dizziness  · Sensitivity to light or noise  · Unusual sleepiness or grogginess  · Trouble falling asleep  · Personality changes  · Vision changes  · Memory loss  · Confusion  · Trouble walking or clumsiness  · Loss of consciousness (even for a short time)  · Inability to be awakened  · Stiff neck  · Weakness or numbness in any part of the body  · Seizures  General care  · If you were prescribed medicines for pain, use them  as directed. Note: Dont take other medicines for pain without talking to your provider first.  · To help reduce swelling and pain, apply a cold source to the injured area for up to 20 minutes at a time. Do this as often as directed. Use a cold pack or bag of ice wrapped in a thin towel. Never apply a cold source directly to the skin.  · If you have cuts or scrapes as a result of your head injury, care for them as directed.  · For the next 24 hours (or longer, if instructed):  ¨ Dont drink alcohol or use sedatives or other medicines that make you sleepy.  ¨ Dont drive or operate machinery.  ¨ Dont do anything strenuous, such as heavy lifting or straining.  ¨ Limit tasks that require concentration. This includes reading, using a smartphone or computer, watching TV, and playing video games.  ¨ Dont return to sports or other activities that could result in another head injury.  Follow-up care  Follow up with your healthcare provider, or as directed. If imaging tests were done, they will be reviewed by a doctor. You will be told the results and any new findings that may affect your care.  When to seek medical advice  Call your healthcare provider right away if any of these occur:  · Pain doesnt get better or worsens  · New or increased swelling or bruising  · Fever of 100.4°F (38°C) or higher, or as directed by your provider  · Increased redness, warmth, drainage, or bleeding from the injured area  · Fluid drainage or bleeding from the nose or ears  · Any depression or bony abnormality in the injured area  Date Last Reviewed: 9/26/2015 © 2000-2017 [employer]. [address] [place] [id number]. All rights reserved. This information is not intended as a substitute for professional medical care. Always follow your healthcare professional's instructions.        Abrasions  Abrasions are skin scrapes. Their treatment depends on how large and deep the abrasion is.  Home care  You may be prescribed  an antibiotic cream or ointment to apply to the wound. This helps prevent infection. Follow instructions when using this medicine.  General care  · To care for the abrasion, do the following each day for as long as directed by your healthcare provider.  ¨ If you were given a bandage, change it once a day. If your bandage sticks to the wound, soak it in warm water until it loosens.  ¨ Wash the area with soap and warm water. You may do this in a sink or under a tub faucet or shower. Rinse off the soap. Then pat the area dry with a clean towel.  ¨ If antibiotic ointment or cream was prescribed, reapply it to the wound as directed. Cover the wound with a fresh nonstick bandage. If the bandage becomes wet or dirty, change it as soon as possible.  ¨ Some antibiotic ointments or cream can cause an allergic reaction or dermatitis. This may cause redness, itching and or hives. If this occurs, stop using the ointment immediately and wash off any remaining ointment. You may need to take some allergy medicine to relieve symptoms.  · You may use acetaminophen or ibuprofen to control pain unless another pain medicine was prescribed. Talk with your healthcare provider before using these medicines if you have chronic liver or kidney disease or ever had a stomach ulcer or GI bleeding. Dont use ibuprofen in children younger than six months old.  · Most skin wounds heal within 10 days. But an infection may occur even with treatment. So its important to watch the wound for signs of infection as listed below.  Follow-up care  Follow up with your healthcare provider, or as advised.  When to seek medical advice  Call your healthcare provider right away if any of these occur:  · Fever of 100.4ºF (38ºC) or higher, or as directed by your healthcare provider  · Increasing pain, redness, swelling, or drainage from the wound  · Bleeding from the wound that does not stop after a few minutes of steady, firm pressure  · Decreased ability to move  any body part near the wound  Date Last Reviewed: 3/3/2017  © 4593-9342 [employer] [address] [place] [address], [place] [address]. All rights reserved. This information is not intended as a substitute for professional medical care. Always follow your healthcare professional's instructions.        Treating Strains and Sprains  Strains and sprains happen when muscles or other soft tissues near your bones stretch or tear. These injuries can cause bruising, swelling, and pain. To ease your discomfort and speed the healing of your strain or sprain, follow the tips below. Remember, a strain or sprain can take 6 to 8 weeks to heal.     Important Note: Do not give aspirin to children or teens without discussing it with your healthcare provider first.        Ice first, heat later  · Use ice for the first 24 to 48 hours after injury. Ice helps prevent swelling and reduce pain. Ice the injury for no more than 20 minutes at a time and allow at least  20 minutes between icing sessions.  · Apply heat after the first 72 hours, once the swelling has gone down. Heat relaxes muscles and increases blood flow. Soak the injured area in warm water or use a heating pad set on low for no more than 15 minutes at a time.  Wrap and elevate  · Wrap an injured limb firmly with an elastic bandage. This provides support and helps prevent swelling. Dont wear an elastic bandage overnight. Watch for tingling, numbness, or increased pain, and remove the bandage immediately if any of these occurs.  · Elevate the injured area to help reduce swelling and throbbing. Its best to raise an injured limb above the level of your heart.     Medicines  · Over-the-counter medicines such as acetaminophen or ibuprofen can help reduce pain. Some also help reduce swelling.  · Take medicine only as directed.  · Rest the area even if medicines are controlling the pain.  Rest  · Rest the injured area by not using it for 24 hours.  · When youre ready,  return slowly to your normal activities. Rest the injured area often.  · Dont use or walk on an injured limb if it hurts.  Date Last Reviewed: 9/3/2015  © 2102-5544 [employer]. [address] [place] [address], [place] [address]. All rights reserved. This information is not intended as a substitute for professional medical care. Always follow your healthcare professional's instructions.

## 2019-02-24 NOTE — PATIENT INSTRUCTIONS
Go to the Emergency Room if symptoms or condition worsens in any way    Follow up with   Orthopedist    in 5-7 days if not improved  [phone]    Facial Contusion  A contusion is another word for a bruise. It happens when small blood vessels break open and leak blood into the nearby area. A facial contusion can result from a bump, hit, or fall. This may happen during sports or an accident. Symptoms of a contusion often include changes in skin color (bruising), swelling, and pain.   The swelling from the contusion should decrease in a few days. Bruising and pain may take several weeks to go away.   Home care  · If you have been prescribed medicines for pain, take them as directed.  · To help reduce swelling and pain, wrap a cold pack or bag of frozen peas in a thin towel. Put it on the injured area for up to 20 minutes. Do this a few times a day until the swelling goes down.   · If you have scrapes or cuts on your face requiring stiches or other closures, care for them as directed.  · For the next 24 hours (or longer if instructed):  ¨ Dont drink alcohol, or use sedatives or medicines that make you sleepy.  ¨ Dont drive or operate machinery.  ¨ Avoid doing anything strenuous. Dont lift or strain.  ¨ Do not return to sports or other activity that could result in another head injury.  Note about concussion  Because the injury was to your head, it is possible that a concussion (mild brain injury) could result. You don't have signs of a concussion at this time. But symptoms can show up later. Be alert for signs and symptoms of a concussion. Seek emergency medical care if any of these develop over the next hours to days:  · Headache  · Nausea or vomiting  · Dizziness  · Sensitivity to light or noise  · Unusual sleepiness or grogginess  · Trouble falling asleep  · Personality changes  · Vision changes  · Memory loss  · Confusion  · Trouble walking or clumsiness  · Loss of consciousness (even for a short  time)  · Inability to be awakened   Follow-up care  Follow up with your healthcare provider or our staff as directed.  When to seek medical advice  Call your healthcare provider right away if any of these occur:  · Swelling or pain that gets worse, not better  · New swelling or pain  · Warmth or drainage from the swollen area or from cuts or scrapes  · Fluid drainage or bleeding from the nose or ears  · Fever of 100.4ºF (38ºC) or higher, or as directed by your healthcare provider  Date Last Reviewed: 5/7/2015 © 2000-2017 [employer]. [address] [place] [address]. All rights reserved. This information is not intended as a substitute for professional medical care. Always follow your healthcare professional's instructions.        Head Injury (Adult)    You have a head injury. It does not appear serious at this time. But symptoms of a more serious problem, such as a mild brain injury (concussion) or bruising or bleeding in the brain, may appear later. For this reason, you or someone caring for you will need to watch for the symptoms listed below. Once youre home, also be sure to follow any care instructions youre given.  Home care  Watch for the following symptoms  Seek emergency medical care if you have any of these symptoms over the next hours to days:   · Headache  · Nausea or vomiting  · Dizziness  · Sensitivity to light or noise  · Unusual sleepiness or grogginess  · Trouble falling asleep  · Personality changes  · Vision changes  · Memory loss  · Confusion  · Trouble walking or clumsiness  · Loss of consciousness (even for a short time)  · Inability to be awakened  · Stiff neck  · Weakness or numbness in any part of the body  · Seizures  General care  · If you were prescribed medicines for pain, use them as directed. Note: Dont take other medicines for pain without talking to your provider first.  · To help reduce swelling and pain, apply a cold source to the injured area for up to  20 minutes at a time. Do this as often as directed. Use a cold pack or bag of ice wrapped in a thin towel. Never apply a cold source directly to the skin.  · If you have cuts or scrapes as a result of your head injury, care for them as directed.  · For the next 24 hours (or longer, if instructed):  ¨ Dont drink alcohol or use sedatives or other medicines that make you sleepy.  ¨ Dont drive or operate machinery.  ¨ Dont do anything strenuous, such as heavy lifting or straining.  ¨ Limit tasks that require concentration. This includes reading, using a smartphone or computer, watching TV, and playing video games.  ¨ Dont return to sports or other activities that could result in another head injury.  Follow-up care  Follow up with your healthcare provider, or as directed. If imaging tests were done, they will be reviewed by a doctor. You will be told the results and any new findings that may affect your care.  When to seek medical advice  Call your healthcare provider right away if any of these occur:  · Pain doesnt get better or worsens  · New or increased swelling or bruising  · Fever of 100.4°F (38°C) or higher, or as directed by your provider  · Increased redness, warmth, drainage, or bleeding from the injured area  · Fluid drainage or bleeding from the nose or ears  · Any depression or bony abnormality in the injured area  Date Last Reviewed: 9/26/2015 © 2000-2017 [employer]. [address] [place] [address]. All rights reserved. This information is not intended as a substitute for professional medical care. Always follow your healthcare professional's instructions.        Abrasions  Abrasions are skin scrapes. Their treatment depends on how large and deep the abrasion is.  Home care  You may be prescribed an antibiotic cream or ointment to apply to the wound. This helps prevent infection. Follow instructions when using this medicine.  General care  · To care for the abrasion, do the  following each day for as long as directed by your healthcare provider.  ¨ If you were given a bandage, change it once a day. If your bandage sticks to the wound, soak it in warm water until it loosens.  ¨ Wash the area with soap and warm water. You may do this in a sink or under a tub faucet or shower. Rinse off the soap. Then pat the area dry with a clean towel.  ¨ If antibiotic ointment or cream was prescribed, reapply it to the wound as directed. Cover the wound with a fresh nonstick bandage. If the bandage becomes wet or dirty, change it as soon as possible.  ¨ Some antibiotic ointments or cream can cause an allergic reaction or dermatitis. This may cause redness, itching and or hives. If this occurs, stop using the ointment immediately and wash off any remaining ointment. You may need to take some allergy medicine to relieve symptoms.  · You may use acetaminophen or ibuprofen to control pain unless another pain medicine was prescribed. Talk with your healthcare provider before using these medicines if you have chronic liver or kidney disease or ever had a stomach ulcer or GI bleeding. Dont use ibuprofen in children younger than six months old.  · Most skin wounds heal within 10 days. But an infection may occur even with treatment. So its important to watch the wound for signs of infection as listed below.  Follow-up care  Follow up with your healthcare provider, or as advised.  When to seek medical advice  Call your healthcare provider right away if any of these occur:  · Fever of 100.4ºF (38ºC) or higher, or as directed by your healthcare provider  · Increasing pain, redness, swelling, or drainage from the wound  · Bleeding from the wound that does not stop after a few minutes of steady, firm pressure  · Decreased ability to move any body part near the wound  Date Last Reviewed: 3/3/2017  © 7236-1540 [employer]. [address] [place] [address], [place] [address]. All rights reserved. This information  is not intended as a substitute for professional medical care. Always follow your healthcare professional's instructions.        Treating Strains and Sprains  Strains and sprains happen when muscles or other soft tissues near your bones stretch or tear. These injuries can cause bruising, swelling, and pain. To ease your discomfort and speed the healing of your strain or sprain, follow the tips below. Remember, a strain or sprain can take 6 to 8 weeks to heal.     Important Note: Do not give aspirin to children or teens without discussing it with your healthcare provider first.        Ice first, heat later  · Use ice for the first 24 to 48 hours after injury. Ice helps prevent swelling and reduce pain. Ice the injury for no more than 20 minutes at a time and allow at least  20 minutes between icing sessions.  · Apply heat after the first 72 hours, once the swelling has gone down. Heat relaxes muscles and increases blood flow. Soak the injured area in warm water or use a heating pad set on low for no more than 15 minutes at a time.  Wrap and elevate  · Wrap an injured limb firmly with an elastic bandage. This provides support and helps prevent swelling. Dont wear an elastic bandage overnight. Watch for tingling, numbness, or increased pain, and remove the bandage immediately if any of these occurs.  · Elevate the injured area to help reduce swelling and throbbing. Its best to raise an injured limb above the level of your heart.     Medicines  · Over-the-counter medicines such as acetaminophen or ibuprofen can help reduce pain. Some also help reduce swelling.  · Take medicine only as directed.  · Rest the area even if medicines are controlling the pain.  Rest  · Rest the injured area by not using it for 24 hours.  · When youre ready, return slowly to your normal activities. Rest the injured area often.  · Dont use or walk on an injured limb if it hurts.  Date Last Reviewed: 9/3/2015  © 2647-6173 [employer]. [address] [place] [address], [place] [address]. All rights reserved. This information is not intended as a substitute for professional medical care. Always follow your healthcare professional's instructions.

## 2019-03-19 ENCOUNTER — OFFICE VISIT (OUTPATIENT)
Dept: INTERNAL MEDICINE | Facility: CLINIC | Age: 84
End: 2019-03-19
Payer: MEDICARE

## 2019-03-19 VITALS
BODY MASS INDEX: 19.79 KG/M2 | HEIGHT: 64 IN | OXYGEN SATURATION: 97 % | HEART RATE: 66 BPM | TEMPERATURE: 98 F | RESPIRATION RATE: 17 BRPM | SYSTOLIC BLOOD PRESSURE: 114 MMHG | DIASTOLIC BLOOD PRESSURE: 68 MMHG | WEIGHT: 115.94 LBS

## 2019-03-19 DIAGNOSIS — D17.1 LIPOMA OF BACK: ICD-10-CM
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
## 2019-03-19 DIAGNOSIS — D17.21 LIPOMA OF RIGHT UPPER EXTREMITY: ICD-10-CM

## 2019-03-19 DIAGNOSIS — I10 ESSENTIAL HYPERTENSION: Primary | ICD-10-CM

## 2019-03-19 PROCEDURE — 99999 PR PBB SHADOW E&M-EST. PATIENT-LVL IV: ICD-10-PCS | Mod: PBBFAC,,, | Performed by: HOSPITALIST

## 2019-03-19 PROCEDURE — 99214 OFFICE O/P EST MOD 30 MIN: CPT | Mod: S$PBB,,, | Performed by: HOSPITALIST

## 2019-03-19 PROCEDURE — 99214 PR OFFICE/OUTPT VISIT, EST, LEVL IV, 30-39 MIN: ICD-10-PCS | Mod: S$PBB,,, | Performed by: HOSPITALIST

## 2019-03-19 PROCEDURE — 99999 PR PBB SHADOW E&M-EST. PATIENT-LVL IV: CPT | Mod: PBBFAC,,, | Performed by: HOSPITALIST

## 2019-03-19 PROCEDURE — 99214 OFFICE O/P EST MOD 30 MIN: CPT | Mod: PBBFAC,PO | Performed by: HOSPITALIST

## 2019-03-19 RX ORDER — SOLIFENACIN SUCCINATE 5 MG/1
TABLET, FILM COATED ORAL
COMMUNITY
Start: 2013-02-18 | End: 2019-03-19 | Stop reason: SDUPTHER

## 2019-03-19 RX ORDER — FERROUS SULFATE 325(65) MG
TABLET, DELAYED RELEASE (ENTERIC COATED) ORAL
COMMUNITY
Start: 2018-11-08

## 2019-03-19 RX ORDER — LISINOPRIL 5 MG/1
TABLET ORAL
COMMUNITY
Start: 2018-11-06 | End: 2019-03-19

## 2019-03-19 RX ORDER — LOSARTAN POTASSIUM 25 MG/1
25 TABLET ORAL DAILY
Qty: 90 TABLET | Refills: 3 | Status: SHIPPED | OUTPATIENT
Start: 2019-03-19 | End: 2019-06-19

## 2019-03-19 NOTE — PROGRESS NOTES
"Subjective:     @Patient ID: Brent Alatorre is a 86 y.o. male.    Chief Complaint: Establish Care; Arm Pain ( Leftt Shoulder pain); Cyst (Under right arm); Itching (Back); and Equilibriuim ( Pt. states, "Feels like going to fall at times")    HPI  87 yo male presents to establish care and with report of multiple issues. He is accompanied by his wife. Reports he had a fall 3 weeks ago whereby he tripped over the curb and fell on his head. EMS was at the corner; this was [place]. States he went to urgent care; xray of hand and shoulder were negative for fractures. Reports he has been afraid of falling since then. Does not have difficulty walking or use a walking aide. He reports he also has a cyst under his R underarm and on his back. His wife also notes he has had decreased appetite and states he has lost weight. Pt reports that he has recently had increased appetite and has been eating more. His wife confirms this. Also reports using ensure shakes sometimes as meal supplementation.     Reports having decreased eating. Lost 20 lb over 2 years. Reports he is eating more this past week.     Review of Systems   Constitutional: Negative for chills and fever.   HENT: Negative for congestion and sore throat.    Eyes: Negative for pain and visual disturbance.   Respiratory: Negative for cough and shortness of breath.    Cardiovascular: Negative for chest pain and leg swelling.   Gastrointestinal: Negative for abdominal pain, nausea and vomiting.   Endocrine: Negative for polydipsia and polyuria.   Genitourinary: Negative for difficulty urinating and dysuria.   Musculoskeletal: Negative for arthralgias and back pain.   Skin: Negative for rash and wound.        +skin changes   Neurological: Negative for weakness and headaches.   Psychiatric/Behavioral: Negative for agitation and confusion.     Past medical history, surgical history, and family medical history reviewed and updated as appropriate.    Medications and " "allergies reviewed.     Objective:     Vitals:    03/19/19 1440   BP: 114/68   BP Location: Right arm   Patient Position: Sitting   BP Method: Medium (Manual)   Pulse: 66   Resp: 17   Temp: 97.9 °F (36.6 °C)   TempSrc: Oral   SpO2: 97%   Weight: 52.6 kg (115 lb 15.4 oz)   Height: 5' 4" (1.626 m)     Body mass index is 19.9 kg/m².  Physical Exam   Constitutional: He is oriented to person, place, and time. He appears well-developed and well-nourished. No distress.   HENT:   Head: Normocephalic and atraumatic.   Mouth/Throat: Oropharynx is clear and moist. No oropharyngeal exudate.   Eyes: Conjunctivae are normal. Pupils are equal, round, and reactive to light. Right eye exhibits no discharge. Left eye exhibits no discharge.   Neck: Normal range of motion. Neck supple.   Cardiovascular: Normal rate, regular rhythm and normal heart sounds. Exam reveals no friction rub.   No murmur heard.  Pulmonary/Chest: Effort normal and breath sounds normal.   Abdominal: Soft. Bowel sounds are normal. He exhibits no distension. There is no tenderness.   Musculoskeletal: Normal range of motion. He exhibits no edema.   Lymphadenopathy:     He has no cervical adenopathy.   Neurological: He is alert and oriented to person, place, and time.   Skin: Skin is warm and dry.   Psychiatric: He has a normal mood and affect. His behavior is normal.   Vitals reviewed.      Lab Results   Component Value Date    WBC 3.20 (L) 06/19/2018    HGB 10.9 (L) 06/19/2018    HCT 34.4 (L) 06/19/2018     06/19/2018    CHOL 149 06/19/2018    TRIG 43 06/19/2018    HDL 65 06/19/2018    ALT 12 06/19/2018    AST 15 06/19/2018     06/19/2018    K 4.8 06/19/2018     (H) 06/19/2018    CREATININE 1.2 06/19/2018    BUN 19 06/19/2018    CO2 21 (L) 06/19/2018    TSH 1.265 06/19/2018    HGBA1C 5.6 06/19/2018       Assessment:     1. Essential hypertension    2. Lipoma of right upper extremity    3. Lipoma of back    4. Fall, sequela      Plan:   Brent" was seen today for establish care, arm pain, cyst, itching and equilibriuim.    Diagnoses and all orders for this visit:    Essential hypertension  - BP controlled. D/c acei due to lung ca linke   -     losartan (COZAAR) 25 MG tablet; Take 1 tablet (25 mg total) by mouth once daily.    Lipoma of right upper extremity  -     Ambulatory Referral to Dermatology    Lipoma of back  -     Ambulatory Referral to Dermatology    Fall, sequela         - Counseled pt to take time when change positions (ie sit/lay to stand), to not be in a hurry. If notices he is having balance issues to start using cane/RW for stability.         Follow-up in about 3 months (around 6/19/2019).    Lupe Lezama MD  Internal Medicine    3/19/2019

## 2019-05-14 ENCOUNTER — OFFICE VISIT (OUTPATIENT)
Dept: URGENT CARE | Facility: CLINIC | Age: 84
End: 2019-05-14
Payer: MEDICARE

## 2019-05-14 VITALS
RESPIRATION RATE: 18 BRPM | HEART RATE: 65 BPM | BODY MASS INDEX: 19.63 KG/M2 | SYSTOLIC BLOOD PRESSURE: 174 MMHG | DIASTOLIC BLOOD PRESSURE: 74 MMHG | TEMPERATURE: 98 F | HEIGHT: 64 IN | WEIGHT: 115 LBS | OXYGEN SATURATION: 96 %

## 2019-05-14 DIAGNOSIS — M67.431 GANGLION CYST OF VOLAR ASPECT OF RIGHT WRIST: Primary | ICD-10-CM

## 2019-05-14 PROCEDURE — 99214 OFFICE O/P EST MOD 30 MIN: CPT | Mod: S$GLB,,, | Performed by: EMERGENCY MEDICINE

## 2019-05-14 PROCEDURE — 99214 PR OFFICE/OUTPT VISIT, EST, LEVL IV, 30-39 MIN: ICD-10-PCS | Mod: S$GLB,,, | Performed by: EMERGENCY MEDICINE

## 2019-05-14 RX ORDER — AMLODIPINE BESYLATE 5 MG/1
5 TABLET ORAL DAILY
Qty: 30 TABLET | Refills: 0 | Status: SHIPPED | OUTPATIENT
Start: 2019-05-14 | End: 2019-06-19 | Stop reason: SDUPTHER

## 2019-05-14 NOTE — PATIENT INSTRUCTIONS
Follow up with  Orthopedist     in 5-7 days if not improved  [phone]    Stop taking losartan start taking Norvasc(Amlodipine) as prescribed    Ganglion Cyst    A ganglion cyst usually is a painless bump on the wrist or finger joint. It connects to the joint capsule and grows like a balloon on a stalk. It is filled with joint fluid. The cause of a ganglion cyst is not known.   If the cyst puts pressure on a nearby nerve it may cause pain. Otherwise, cysts are painless and harmless. Most tend to disappear over time without treatment. Do not try to drain or break the cyst at home. This can cause harm and does not cure the problem.  If you are having pain from the cyst, a temporary wrist splint may be helpful to limit wrist motion. If this does not help, the fluid can be removed from the cyst. This should shrink the size of the cyst. If this doesnt give relief, the ganglion can be removed by surgery.  Home care  · If you are having wrist pain, use a wrist splint for 1-2 weeks at a time. You can buy one at many drug stores without a prescription.  · You may use over-the-counter pain medicine to control pain, unless another medicine was prescribed. If you have chronic liver or kidney disease or ever had a stomach ulcer or GI bleeding, talk with your healthcare provider before using these medicines.    · If a needle was used to drain the cyst fluid or inject medicine into it, keep the site clean and covered with a bandage for the first 24 hours. If a pressure dressing was applied, leave it in place for the time advised.  Follow-up care  Follow up with your healthcare provider, or as advised by our staff. Make an appointment for a repeat exam if pain continues for more than 2 weeks in a wrist splint.  When to seek medical advice  Call your healthcare provider right away if any of these occur:  · Increasing pain in the wrist  · Redness over the cyst  · Fluid draining from the cyst  · Numbness or tingling in the hand or  arm  Date Last Reviewed: 11/20/2015  © 4984-1729 [employer], [employer]. [address] [place] [address], [place] [address]. All rights reserved. This information is not intended as a substitute for professional medical care. Always follow your healthcare professional's instructions.

## 2019-05-14 NOTE — PROGRESS NOTES
"Subjective:       Patient ID: Brent Alatorre is a 86 y.o. male.    Vitals:    05/14/19 1100   BP: (!) 174/74   Pulse: 65   Resp: 18   Temp: 98.1 °F (36.7 °C)   SpO2: 96%   Weight: 52.2 kg (115 lb)   Height: 5' 4" (1.626 m)       Chief Complaint: Wrist Pain (swelling RT 3 DAYS NOW FELL 6 WEEKS AGO )    Wrist Injury    The incident occurred more than 1 week ago (6 WEEKS AGO ). The incident occurred at home. The injury mechanism was a fall. The pain is present in the right wrist. The quality of the pain is described as aching. The pain does not radiate. The pain is at a severity of 3/10. The pain is mild. The pain has been constant since the incident. Pertinent negatives include no chest pain or numbness. The symptoms are aggravated by movement. He has tried nothing for the symptoms.     Review of Systems   Constitution: Negative for malaise/fatigue.   HENT: Negative for nosebleeds.    Cardiovascular: Negative for chest pain and syncope.   Respiratory: Negative for shortness of breath.    Musculoskeletal: Positive for joint pain and joint swelling. Negative for back pain and neck pain.   Gastrointestinal: Negative for abdominal pain.   Genitourinary: Negative for hematuria.   Neurological: Negative for dizziness, numbness and weakness.       Objective:      Physical Exam   Constitutional: He is oriented to person, place, and time. He appears well-developed and well-nourished.   HENT:   Head: Normocephalic and atraumatic. Head is without abrasion, without contusion and without laceration.   Right Ear: External ear normal.   Left Ear: External ear normal.   Nose: Nose normal.   Mouth/Throat: Oropharynx is clear and moist.   Eyes: Pupils are equal, round, and reactive to light. Conjunctivae, EOM and lids are normal.   Neck: Trachea normal, full passive range of motion without pain and phonation normal. Neck supple.   Cardiovascular: Normal rate, regular rhythm and normal heart sounds.   Pulmonary/Chest: Effort normal " and breath sounds normal. No stridor. No respiratory distress.   Musculoskeletal:        Right wrist: He exhibits decreased range of motion and swelling. He exhibits no tenderness, no bony tenderness, no effusion, no crepitus, no deformity and no laceration.   Patient has a ganglion cyst present to the volar aspect of the right hand without signs of infection patient neurovascularly intact distally   Neurological: He is alert and oriented to person, place, and time.   Skin: Skin is warm, dry and intact. Capillary refill takes less than 2 seconds. No abrasion, no bruising, no burn, no ecchymosis, no laceration, no lesion and no rash noted. No erythema.   Psychiatric: He has a normal mood and affect. His speech is normal and behavior is normal. Judgment and thought content normal. Cognition and memory are normal.   Nursing note and vitals reviewed.      Assessment:       1. Ganglion cyst of volar aspect of right wrist        Plan:       Brent was seen today for wrist pain.    Diagnoses and all orders for this visit:    Ganglion cyst of volar aspect of right wrist  -     WRIST BRACE FOR HOME USE  -     Ambulatory referral to Orthopedics    Other orders  -     amLODIPine (NORVASC) 5 MG tablet; Take 1 tablet (5 mg total) by mouth once daily.          Patient Instructions     Follow up with  Orthopedist     in 5-7 days if not improved  [id number]    Stop taking losartan start taking Norvasc(Amlodipine) as prescribed    Ganglion Cyst    A ganglion cyst usually is a painless bump on the wrist or finger joint. It connects to the joint capsule and grows like a balloon on a stalk. It is filled with joint fluid. The cause of a ganglion cyst is not known.   If the cyst puts pressure on a nearby nerve it may cause pain. Otherwise, cysts are painless and harmless. Most tend to disappear over time without treatment. Do not try to drain or break the cyst at home. This can cause harm and does not cure the problem.  If you are having  pain from the cyst, a temporary wrist splint may be helpful to limit wrist motion. If this does not help, the fluid can be removed from the cyst. This should shrink the size of the cyst. If this doesnt give relief, the ganglion can be removed by surgery.  Home care  · If you are having wrist pain, use a wrist splint for 1-2 weeks at a time. You can buy one at many drug stores without a prescription.  · You may use over-the-counter pain medicine to control pain, unless another medicine was prescribed. If you have chronic liver or kidney disease or ever had a stomach ulcer or GI bleeding, talk with your healthcare provider before using these medicines.    · If a needle was used to drain the cyst fluid or inject medicine into it, keep the site clean and covered with a bandage for the first 24 hours. If a pressure dressing was applied, leave it in place for the time advised.  Follow-up care  Follow up with your healthcare provider, or as advised by our staff. Make an appointment for a repeat exam if pain continues for more than 2 weeks in a wrist splint.  When to seek medical advice  Call your healthcare provider right away if any of these occur:  · Increasing pain in the wrist  · Redness over the cyst  · Fluid draining from the cyst  · Numbness or tingling in the hand or arm  Date Last Reviewed: 11/20/2015  © 2500-3337 [employer]. [address] [place] [address], [place] [address]. All rights reserved. This information is not intended as a substitute for professional medical care. Always follow your healthcare professional's instructions.

## 2019-05-23 NOTE — PROGRESS NOTES
Subjective:      Patient ID: Brent Alatorre is a 86 y.o. male.    Chief Complaint: Pain of the Right Wrist      HPI  Brent Alatorre is a 86 y.o. male presenting today for right wrist pain. He notes onset several weeks ago. There was no injury. He also may have had a volar ganglion cyst at one point. This has all nearly resolved. 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 He wears a wrist brace sometimes which helps. Pt also regularly applies heat.    Review of patient's allergies indicates:  No Known Allergies      Current Outpatient Medications   Medication Sig Dispense Refill    amLODIPine (NORVASC) 5 MG tablet Take 1 tablet (5 mg total) by mouth once daily. 30 tablet 0    ferrous sulfate 325 (65 FE) MG EC tablet Take by mouth.      HYDROcodone-acetaminophen (NORCO) 5-325 mg per tablet Take 1 tablet by mouth every 6 (six) hours as needed for Pain. Please start with 1/2 tablet every 6 hours for severe pain ONLY 9 tablet 0    losartan (COZAAR) 25 MG tablet Take 1 tablet (25 mg total) by mouth once daily. 90 tablet 3    multivitamin capsule Take 1 capsule by mouth once daily.      solifenacin (VESICARE) 10 MG tablet Take 1 tablet (10 mg total) by mouth once daily. 90 tablet 3    diclofenac sodium (VOLTAREN) 1 % Gel Apply 2 g topically once daily. As needed. 100 g 2     No current facility-administered medications for this visit.        Past Medical History:   Diagnosis Date    Anemia     Bladder cancer     Cancer     bladder    Cataracts, bilateral     Colon polyp     History of kidney problems     Hypertension     Primary osteoarthritis of both hands 11/29/2017       Past Surgical History:   Procedure Laterality Date    BLADDER AUGMENTATION  1995    bladder cancer removal and colon resection prostatectomy     BLADDER SURGERY      cystectomy    COLON SURGERY  1995    COLONOSCOPY N/A 7/31/2013    Performed by [facility]  "TRUONG Nguyen MD at [facility] ENDO (4TH FLR)    CYSTOSCOPY      HERNIA REPAIR      PROSTATE SURGERY  1995    REPAIR-HERNIA-INGUINAL Right 5/21/2014    Performed by Scar Glaser MD at [facility] OR 2ND FLR       Review of Systems:  Constitutional: Negative for chills and fever.   Respiratory: Negative for cough and shortness of breath.    Gastrointestinal: Negative for nausea and vomiting.   Skin: Negative for rash.   Neurological: Negative for dizziness and headaches.   Psychiatric/Behavioral: Negative for depression.   MSK as in HPI       OBJECTIVE:     PHYSICAL EXAM:  /75   Pulse 82   Ht 5' 4" (1.626 m)   Wt 52.2 kg (115 lb)   BMI 19.74 kg/m²     GEN:  NAD, well-developed, well-groomed.  NEURO: Awake, alert, and oriented. Normal attention and concentration.    PSYCH: Normal mood and affect. Behavior is normal.  HEENT: No cervical lymphadenopathy noted.  CARDIOVASCULAR: Radial pulses 2+ bilaterally. No LE edema noted.  PULMONARY: Breath sounds normal. No respiratory distress.  SKIN: Intact, no rashes.      MSK:   RUE:  Good active ROM of the wrist and fingers. Mild ttp over the thumb CMC. No cyst appreciated on todays exam. There is arthritis deformity at multiple joints. AIN/PIN/Radial/Median/Ulnar Nerves assessed in isolation without deficit. Radial & Ulnar arteries palpated 2+. Capillary Refill <3s.    RADIOGRAPHS:  Xray right hand 2/24/19  Impression     1. No convincing acute displaced fracture or dislocation of the hand noting degenerative changes and mild dorsal edema.   Comments: I have personally reviewed the imaging and I agree with the above radiologist's report.    ASSESSMENT/PLAN:       ICD-10-CM ICD-9-CM   1. Right wrist pain M25.531 719.43   2. Osteoarthritis, unspecified osteoarthritis type, unspecified site M19.90 715.90       Orders Placed This Encounter    diclofenac sodium (VOLTAREN) 1 % Gel      Plan:   -symptoms almost completely resolved. Will send Voltaren to pharmacy for use as " needed. Also recommend heat, continue brace as needed.   -RTC as needed        The patient indicates understanding of these issues and agrees to the plan.    Sivan Elizabeth PA-C  [facility] [place]

## 2019-05-24 ENCOUNTER — OFFICE VISIT (OUTPATIENT)
Dept: ORTHOPEDICS | Facility: CLINIC | Age: 84
End: 2019-05-24
Payer: MEDICARE

## 2019-05-24 ENCOUNTER — TELEPHONE (OUTPATIENT)
Dept: ORTHOPEDICS | Facility: CLINIC | Age: 84
End: 2019-05-24

## 2019-05-24 VITALS
SYSTOLIC BLOOD PRESSURE: 132 MMHG | WEIGHT: 115 LBS | HEIGHT: 64 IN | HEART RATE: 82 BPM | BODY MASS INDEX: 19.63 KG/M2 | DIASTOLIC BLOOD PRESSURE: 75 MMHG

## 2019-05-24 DIAGNOSIS — M25.531 RIGHT WRIST PAIN: Primary | ICD-10-CM

## 2019-05-24 DIAGNOSIS — M19.90 OSTEOARTHRITIS, UNSPECIFIED OSTEOARTHRITIS TYPE, UNSPECIFIED SITE: ICD-10-CM

## 2019-05-24 PROCEDURE — 99999 PR PBB SHADOW E&M-EST. PATIENT-LVL III: ICD-10-PCS | Mod: PBBFAC,,, | Performed by: PHYSICIAN ASSISTANT

## 2019-05-24 PROCEDURE — 99203 PR OFFICE/OUTPT VISIT, NEW, LEVL III, 30-44 MIN: ICD-10-PCS | Mod: S$PBB,,, | Performed by: PHYSICIAN ASSISTANT

## 2019-05-24 PROCEDURE — 99999 PR PBB SHADOW E&M-EST. PATIENT-LVL III: CPT | Mod: PBBFAC,,, | Performed by: PHYSICIAN ASSISTANT

## 2019-05-24 PROCEDURE — 99203 OFFICE O/P NEW LOW 30 MIN: CPT | Mod: S$PBB,,, | Performed by: PHYSICIAN ASSISTANT

## 2019-05-24 PROCEDURE — 99213 OFFICE O/P EST LOW 20 MIN: CPT | Mod: PBBFAC | Performed by: PHYSICIAN ASSISTANT

## 2019-05-24 RX ORDER — DICLOFENAC SODIUM 10 MG/G
2 GEL TOPICAL DAILY
Qty: 100 G | Refills: 2 | Status: SHIPPED | OUTPATIENT
Start: 2019-05-24 | End: 2020-01-17

## 2019-05-24 NOTE — TELEPHONE ENCOUNTER
----- Message from Rachana Espinoza sent at 5/24/2019 10:36 AM CDT -----  Contact: Magui Alatorre (Spouse) [phone]  Patient was scheduled for an appointment today at 10:15, but patient states he was lost an unable to make it at scheduled time. He has an referral for Ganglion cyst of volar aspect of right wrist and would like to come in today if possible. Contact Magui Alatorre (Spouse) [phone] for scheduling.       Thanks     Shamir NEGRON

## 2019-06-19 ENCOUNTER — OFFICE VISIT (OUTPATIENT)
Dept: INTERNAL MEDICINE | Facility: CLINIC | Age: 84
End: 2019-06-19
Payer: MEDICARE

## 2019-06-19 ENCOUNTER — LAB VISIT (OUTPATIENT)
Dept: LAB | Facility: HOSPITAL | Age: 84
End: 2019-06-19
Attending: HOSPITALIST
Payer: MEDICARE

## 2019-06-19 VITALS
RESPIRATION RATE: 16 BRPM | SYSTOLIC BLOOD PRESSURE: 118 MMHG | WEIGHT: 118.63 LBS | TEMPERATURE: 99 F | BODY MASS INDEX: 21.02 KG/M2 | HEART RATE: 60 BPM | HEIGHT: 63 IN | DIASTOLIC BLOOD PRESSURE: 60 MMHG | OXYGEN SATURATION: 98 %

## 2019-06-19 DIAGNOSIS — H91.93 BILATERAL HEARING LOSS, UNSPECIFIED HEARING LOSS TYPE: ICD-10-CM

## 2019-06-19 DIAGNOSIS — I10 ESSENTIAL HYPERTENSION: ICD-10-CM

## 2019-06-19 DIAGNOSIS — Z00.00 ENCOUNTER FOR PREVENTIVE HEALTH EXAMINATION: Primary | ICD-10-CM

## 2019-06-19 DIAGNOSIS — Z12.5 PROSTATE CANCER SCREENING: ICD-10-CM

## 2019-06-19 DIAGNOSIS — E78.5 DYSLIPIDEMIA: ICD-10-CM

## 2019-06-19 DIAGNOSIS — Z00.00 ENCOUNTER FOR PREVENTIVE HEALTH EXAMINATION: ICD-10-CM

## 2019-06-19 LAB
ALBUMIN SERPL BCP-MCNC: 3.3 G/DL (ref 3.5–5.2)
ALP SERPL-CCNC: 112 U/L (ref 55–135)
ALT SERPL W/O P-5'-P-CCNC: 11 U/L (ref 10–44)
ANION GAP SERPL CALC-SCNC: 9 MMOL/L (ref 8–16)
AST SERPL-CCNC: 17 U/L (ref 10–40)
BASOPHILS # BLD AUTO: 0.02 K/UL (ref 0–0.2)
BASOPHILS NFR BLD: 0.8 % (ref 0–1.9)
BILIRUB SERPL-MCNC: 0.5 MG/DL (ref 0.1–1)
BUN SERPL-MCNC: 19 MG/DL (ref 8–23)
CALCIUM SERPL-MCNC: 9.6 MG/DL (ref 8.7–10.5)
CHLORIDE SERPL-SCNC: 113 MMOL/L (ref 95–110)
CHOLEST SERPL-MCNC: 151 MG/DL (ref 120–199)
CHOLEST/HDLC SERPL: 2.1 {RATIO} (ref 2–5)
CO2 SERPL-SCNC: 17 MMOL/L (ref 23–29)
COMPLEXED PSA SERPL-MCNC: <0.01 NG/ML (ref 0–4)
CREAT SERPL-MCNC: 1.2 MG/DL (ref 0.5–1.4)
DIFFERENTIAL METHOD: ABNORMAL
EOSINOPHIL # BLD AUTO: 0.2 K/UL (ref 0–0.5)
EOSINOPHIL NFR BLD: 7.9 % (ref 0–8)
ERYTHROCYTE [DISTWIDTH] IN BLOOD BY AUTOMATED COUNT: 18.9 % (ref 11.5–14.5)
EST. GFR  (AFRICAN AMERICAN): >60 ML/MIN/1.73 M^2
EST. GFR  (NON AFRICAN AMERICAN): 54.4 ML/MIN/1.73 M^2
GLUCOSE SERPL-MCNC: 77 MG/DL (ref 70–110)
HCT VFR BLD AUTO: 32.3 % (ref 40–54)
HDLC SERPL-MCNC: 72 MG/DL (ref 40–75)
HDLC SERPL: 47.7 % (ref 20–50)
HGB BLD-MCNC: 9.1 G/DL (ref 14–18)
IMM GRANULOCYTES # BLD AUTO: 0 K/UL (ref 0–0.04)
IMM GRANULOCYTES NFR BLD AUTO: 0 % (ref 0–0.5)
LDLC SERPL CALC-MCNC: 71.6 MG/DL (ref 63–159)
LYMPHOCYTES # BLD AUTO: 0.9 K/UL (ref 1–4.8)
LYMPHOCYTES NFR BLD: 35.6 % (ref 18–48)
MCH RBC QN AUTO: 23.5 PG (ref 27–31)
MCHC RBC AUTO-ENTMCNC: 28.2 G/DL (ref 32–36)
MCV RBC AUTO: 84 FL (ref 82–98)
MONOCYTES # BLD AUTO: 0.3 K/UL (ref 0.3–1)
MONOCYTES NFR BLD: 11.9 % (ref 4–15)
NEUTROPHILS # BLD AUTO: 1.1 K/UL (ref 1.8–7.7)
NEUTROPHILS NFR BLD: 43.8 % (ref 38–73)
NONHDLC SERPL-MCNC: 79 MG/DL
NRBC BLD-RTO: 0 /100 WBC
PLATELET # BLD AUTO: 280 K/UL (ref 150–350)
PMV BLD AUTO: 9.6 FL (ref 9.2–12.9)
POTASSIUM SERPL-SCNC: 4.6 MMOL/L (ref 3.5–5.1)
PROT SERPL-MCNC: 7.6 G/DL (ref 6–8.4)
RBC # BLD AUTO: 3.87 M/UL (ref 4.6–6.2)
SODIUM SERPL-SCNC: 139 MMOL/L (ref 136–145)
TRIGL SERPL-MCNC: 37 MG/DL (ref 30–150)
TSH SERPL DL<=0.005 MIU/L-ACNC: 1.38 UIU/ML (ref 0.4–4)
WBC # BLD AUTO: 2.53 K/UL (ref 3.9–12.7)

## 2019-06-19 PROCEDURE — 84153 ASSAY OF PSA TOTAL: CPT

## 2019-06-19 PROCEDURE — 99214 OFFICE O/P EST MOD 30 MIN: CPT | Mod: PBBFAC,PO | Performed by: HOSPITALIST

## 2019-06-19 PROCEDURE — 80061 LIPID PANEL: CPT

## 2019-06-19 PROCEDURE — 99214 PR OFFICE/OUTPT VISIT, EST, LEVL IV, 30-39 MIN: ICD-10-PCS | Mod: S$PBB,,, | Performed by: HOSPITALIST

## 2019-06-19 PROCEDURE — 99999 PR PBB SHADOW E&M-EST. PATIENT-LVL IV: CPT | Mod: PBBFAC,,, | Performed by: HOSPITALIST

## 2019-06-19 PROCEDURE — 80053 COMPREHEN METABOLIC PANEL: CPT

## 2019-06-19 PROCEDURE — 84443 ASSAY THYROID STIM HORMONE: CPT

## 2019-06-19 PROCEDURE — 99999 PR PBB SHADOW E&M-EST. PATIENT-LVL IV: ICD-10-PCS | Mod: PBBFAC,,, | Performed by: HOSPITALIST

## 2019-06-19 PROCEDURE — 85025 COMPLETE CBC W/AUTO DIFF WBC: CPT

## 2019-06-19 PROCEDURE — 99214 OFFICE O/P EST MOD 30 MIN: CPT | Mod: S$PBB,,, | Performed by: HOSPITALIST

## 2019-06-19 PROCEDURE — 36415 COLL VENOUS BLD VENIPUNCTURE: CPT | Mod: PO

## 2019-06-19 RX ORDER — AMLODIPINE BESYLATE 5 MG/1
5 TABLET ORAL DAILY
Qty: 90 TABLET | Refills: 3 | Status: SHIPPED | OUTPATIENT
Start: 2019-06-19 | End: 2020-08-10 | Stop reason: SDUPTHER

## 2019-06-19 NOTE — PROGRESS NOTES
Subjective:     @Patient ID: Brent Alatorre is a 86 y.o. male.    Chief Complaint: Follow-up (3 month) and Medication Refill (Amlodipine)    HPI    86 y.o. male here for health maintenance exam and follow up of chronic medical conditions. Pt reports he has had difficulty hearing lately. Feels like both ears are involved. 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 Reports he has also had difficulty seeing and would like his eyes checked. He plans to make an appt with his eye doctor. Also pt's wife reports they received multiple letters from insurance/pharmacy about losartan. He was seen in urgent care and medication changed to amlodipine. But he has ran out and would like a refill. Denies falls since last visit. His appetite is improving and he has gained 3 lb since last visit.       Lipid disorders/ASCVD risk (ages >/= 45 or >/= 20 if increased risk ): ordered  DM (>45y yearly or if obese, HTN): A1c   Hepatitis C (one time if born between 0932-6618): n/a     Eye exam: Due   Colorectal Cancer (normal risk 50-75yr): Colonoscopy n/a  Prostate (per discussion with patient starting at 50y or 45y if high risk): ordered  Lung Cancer (low dose CT for ages 55-80 if 30 pack year history and currently smoking/ quit within 15 years):   DEXA (F>66 yo, M >71yo, M&F 50-70 yo with risk factors (smoking, previous fx, wt <70kg; etoh abuse, chronic steroids, RA)):    Abdominal Aortic Aneurysm (Male ages 65-75 one time who has ever smoked): Abdominal Ultrasound n/a     Vaccines:   Influenza (yearly)   Tetanus (every 10 yrs - 1st tdap) done  PPSV23(>64yo or <65 w/ lung dz, smoking, DM) due  PCV13 (> 65 or <65 w/ immunocompromised)done   Zoster (>59yo)     Exercise:  3x/week gym  Diet: regular         Review of Systems   Constitutional: Negative for chills and fever.   HENT: Positive for hearing loss. Negative for congestion and sore throat.    Eyes: Negative for pain and visual disturbance.  "  Respiratory: Negative for cough and shortness of breath.    Cardiovascular: Negative for chest pain and leg swelling.   Gastrointestinal: Negative for abdominal pain, nausea and vomiting.   Endocrine: Negative for polydipsia and polyuria.   Genitourinary: Negative for difficulty urinating and dysuria.   Musculoskeletal: Negative for arthralgias and back pain.   Skin: Negative for rash and wound.   Neurological: Negative for weakness and headaches.   Psychiatric/Behavioral: Negative for agitation and confusion.     Past medical history, surgical history, and family medical history reviewed and updated as appropriate.    Medications and allergies reviewed.     Objective:     Vitals:    06/19/19 0943   BP: 118/60   BP Location: Right arm   Patient Position: Sitting   BP Method: Medium (Manual)   Pulse: 60   Resp: 16   Temp: 98.5 °F (36.9 °C)   TempSrc: Oral   SpO2: 98%   Weight: 53.8 kg (118 lb 9.7 oz)   Height: 5' 3" (1.6 m)     Body mass index is 21.01 kg/m².  Physical Exam   Constitutional: He is oriented to person, place, and time. He appears well-developed and well-nourished. No distress.   HENT:   Head: Normocephalic and atraumatic.   Mouth/Throat: Oropharynx is clear and moist. No oropharyngeal exudate.   Eyes: Conjunctivae are normal. Right eye exhibits no discharge. Left eye exhibits no discharge.   Neck: Normal range of motion. Neck supple.   Cardiovascular: Normal rate, regular rhythm, normal heart sounds and intact distal pulses. Exam reveals no friction rub.   No murmur heard.  Pulmonary/Chest: Effort normal and breath sounds normal.   Abdominal: Soft. Bowel sounds are normal. He exhibits no distension. There is no tenderness.   Musculoskeletal: Normal range of motion. He exhibits no edema.   Lymphadenopathy:     He has no cervical adenopathy.   Neurological: He is alert and oriented to person, place, and time.   Skin: Skin is warm and dry.   Psychiatric: He has a normal mood and affect. His behavior is " normal.   Vitals reviewed.      Lab Results   Component Value Date    WBC 3.20 (L) 06/19/2018    HGB 10.9 (L) 06/19/2018    HCT 34.4 (L) 06/19/2018     06/19/2018    CHOL 149 06/19/2018    TRIG 43 06/19/2018    HDL 65 06/19/2018    ALT 12 06/19/2018    AST 15 06/19/2018     06/19/2018    K 4.8 06/19/2018     (H) 06/19/2018    CREATININE 1.2 06/19/2018    BUN 19 06/19/2018    CO2 21 (L) 06/19/2018    TSH 1.265 06/19/2018    HGBA1C 5.6 06/19/2018       Assessment:     1. Encounter for preventive health examination    2. Essential hypertension    3. Dyslipidemia    4. Bilateral hearing loss, unspecified hearing loss type    5. Prostate cancer screening      Plan:   Brent was seen today for follow-up and medication refill.    Diagnoses and all orders for this visit:    Encounter for preventive health examination  -     (In Office Administered) Pneumococcal Polysaccharide Vaccine (23 Valent) (SQ/IM)  -     Comprehensive metabolic panel; Future  -     Urinalysis; Future    Essential hypertension  -     CBC auto differential; Future  -     TSH; Future  -     amLODIPine (NORVASC) 5 MG tablet; Take 1 tablet (5 mg total) by mouth once daily.    Dyslipidemia  -     CBC auto differential; Future  -     Lipid panel; Future    Bilateral hearing loss, unspecified hearing loss type  -     Ambulatory Referral to ENT    Prostate cancer screening  -     PSA, Screening; Future          Follow up in about 6 months (around 12/19/2019), or if symptoms worsen or fail to improve.    Lupe Lezama MD  Internal Medicine    6/19/2019

## 2019-06-20 ENCOUNTER — PATIENT MESSAGE (OUTPATIENT)
Dept: INTERNAL MEDICINE | Facility: CLINIC | Age: 84
End: 2019-06-20

## 2019-06-21 ENCOUNTER — PATIENT MESSAGE (OUTPATIENT)
Dept: INTERNAL MEDICINE | Facility: CLINIC | Age: 84
End: 2019-06-21

## 2019-07-02 ENCOUNTER — OFFICE VISIT (OUTPATIENT)
Dept: OPTOMETRY | Facility: CLINIC | Age: 84
End: 2019-07-02
Payer: MEDICARE

## 2019-07-02 DIAGNOSIS — H25.13 NUCLEAR SCLEROSIS, BILATERAL: Primary | ICD-10-CM

## 2019-07-02 DIAGNOSIS — H52.4 PRESBYOPIA: ICD-10-CM

## 2019-07-02 DIAGNOSIS — H40.013 OPEN ANGLE WITH BORDERLINE FINDINGS AND LOW GLAUCOMA RISK IN BOTH EYES: ICD-10-CM

## 2019-07-02 DIAGNOSIS — Z13.5 GLAUCOMA SCREENING: ICD-10-CM

## 2019-07-02 PROCEDURE — 92015 DETERMINE REFRACTIVE STATE: CPT | Mod: ,,, | Performed by: OPTOMETRIST

## 2019-07-02 PROCEDURE — 99999 PR PBB SHADOW E&M-EST. PATIENT-LVL II: CPT | Mod: PBBFAC,,, | Performed by: OPTOMETRIST

## 2019-07-02 PROCEDURE — 99212 OFFICE O/P EST SF 10 MIN: CPT | Mod: PBBFAC,PO,25 | Performed by: OPTOMETRIST

## 2019-07-02 PROCEDURE — 92020 PR SPECIAL EYE EVAL,GONIOSCOPY: ICD-10-PCS | Mod: S$PBB,,, | Performed by: OPTOMETRIST

## 2019-07-02 PROCEDURE — 92020 GONIOSCOPY: CPT | Mod: PBBFAC,PO | Performed by: OPTOMETRIST

## 2019-07-02 PROCEDURE — 92014 PR EYE EXAM, EST PATIENT,COMPREHESV: ICD-10-PCS | Mod: S$PBB,,, | Performed by: OPTOMETRIST

## 2019-07-02 PROCEDURE — 92020 GONIOSCOPY: CPT | Mod: S$PBB,,, | Performed by: OPTOMETRIST

## 2019-07-02 PROCEDURE — 99999 PR PBB SHADOW E&M-EST. PATIENT-LVL II: ICD-10-PCS | Mod: PBBFAC,,, | Performed by: OPTOMETRIST

## 2019-07-02 PROCEDURE — 92015 PR REFRACTION: ICD-10-PCS | Mod: ,,, | Performed by: OPTOMETRIST

## 2019-07-02 PROCEDURE — 92014 COMPRE OPH EXAM EST PT 1/>: CPT | Mod: S$PBB,,, | Performed by: OPTOMETRIST

## 2019-07-02 NOTE — PROGRESS NOTES
HPI     DLS:6/28/18  Pt states he is having trouble with small print on the Tv.. Wears OTC   reader +2.75  No f/f  No gtts   glauc susp by CDs      Last edited by Ran Mcgraw, OD on 7/2/2019  9:48 AM. (History)        ROS     Positive for: Eyes (glauc susp by CDs)    Negative for: Constitutional, Gastrointestinal, Neurological, Skin,   Genitourinary, Musculoskeletal, HENT, Endocrine, Cardiovascular,   Respiratory, Psychiatric, Allergic/Imm, Heme/Lymph    Last edited by Ran Mcgraw, OD on 7/2/2019  9:48 AM. (History)        Assessment /Plan     For exam results, see Encounter Report.    Nuclear sclerosis, bilateral    Open angle with borderline findings and low glaucoma risk in both eyes  -     Juarez Visual Field - OU - Extended - Both Eyes; Future; Expected date: 07/02/2020  -     Posterior Segment OCT Optic Nerve- Both eyes; Future; Expected date: 07/02/2020    Presbyopia    Glaucoma screening        1. Cat OU--pt happy w otc readers  2. glauc susp by CDs--followed in past by Dr Haque.  iop wnl without meds.  Last VF wnl.  - fam hx.  Pach: 517/516.  Angles open by gonio OU.  Doubt glauc now--will monitor    PLAN:    rtc 1 yr: HVF 24-2 std/OCT/full

## 2019-07-15 ENCOUNTER — CLINICAL SUPPORT (OUTPATIENT)
Dept: AUDIOLOGY | Facility: CLINIC | Age: 84
End: 2019-07-15
Payer: MEDICARE

## 2019-07-15 ENCOUNTER — OFFICE VISIT (OUTPATIENT)
Dept: OTOLARYNGOLOGY | Facility: CLINIC | Age: 84
End: 2019-07-15
Payer: MEDICARE

## 2019-07-15 VITALS — BODY MASS INDEX: 20.28 KG/M2 | HEIGHT: 63 IN | WEIGHT: 114.44 LBS

## 2019-07-15 DIAGNOSIS — H90.3 SENSORINEURAL HEARING LOSS OF BOTH EARS: Primary | ICD-10-CM

## 2019-07-15 DIAGNOSIS — H91.13 PRESBYCUSIS OF BOTH EARS: Primary | ICD-10-CM

## 2019-07-15 DIAGNOSIS — H61.22 IMPACTED CERUMEN OF LEFT EAR: ICD-10-CM

## 2019-07-15 PROCEDURE — 92567 TYMPANOMETRY: CPT | Mod: PBBFAC | Performed by: AUDIOLOGIST

## 2019-07-15 PROCEDURE — 69210 REMOVE IMPACTED EAR WAX UNI: CPT | Mod: PBBFAC | Performed by: OTOLARYNGOLOGY

## 2019-07-15 PROCEDURE — 69210 REMOVE IMPACTED EAR WAX UNI: CPT | Mod: PBBFAC

## 2019-07-15 PROCEDURE — 69210 REMOVE IMPACTED EAR WAX UNI: CPT | Mod: S$PBB,,, | Performed by: OTOLARYNGOLOGY

## 2019-07-15 PROCEDURE — 99212 OFFICE O/P EST SF 10 MIN: CPT | Mod: PBBFAC,25 | Performed by: OTOLARYNGOLOGY

## 2019-07-15 PROCEDURE — 92557 COMPREHENSIVE HEARING TEST: CPT | Mod: PBBFAC | Performed by: AUDIOLOGIST

## 2019-07-15 PROCEDURE — 99999 PR PBB SHADOW E&M-EST. PATIENT-LVL II: CPT | Mod: PBBFAC,,, | Performed by: OTOLARYNGOLOGY

## 2019-07-15 PROCEDURE — 99999 PR PBB SHADOW E&M-EST. PATIENT-LVL II: ICD-10-PCS | Mod: PBBFAC,,, | Performed by: OTOLARYNGOLOGY

## 2019-07-15 PROCEDURE — 69210 PR REMOVAL IMPACTED CERUMEN REQUIRING INSTRUMENTATION, UNILATERAL: ICD-10-PCS | Mod: S$PBB,,, | Performed by: OTOLARYNGOLOGY

## 2019-07-15 PROCEDURE — 99203 PR OFFICE/OUTPT VISIT, NEW, LEVL III, 30-44 MIN: ICD-10-PCS | Mod: 25,S$PBB,, | Performed by: OTOLARYNGOLOGY

## 2019-07-15 PROCEDURE — 99203 OFFICE O/P NEW LOW 30 MIN: CPT | Mod: 25,S$PBB,, | Performed by: OTOLARYNGOLOGY

## 2019-07-15 NOTE — PROGRESS NOTES
Subjective:       Patient ID: Brent Alatorre is a 87 y.o. male.    Chief Complaint: Hearing Loss (bilateral) and Other (ear wax)    HPI: Hearing loss/ .    Not too bothersome.      Still working.    Past Medical History: Patient has a past medical history of Anemia, Bladder cancer, Cancer, Cataracts, bilateral, Colon polyp, History of kidney problems, Hypertension, and Primary osteoarthritis of both hands (11/29/2017).    Past Surgical History: Patient has a past surgical history that includes Bladder augmentation (1995); Colon surgery (1995); Prostate surgery (1995); Bladder surgery; Cystoscopy; and Hernia repair.    Social History: Patient reports that he has never smoked. He has never used smokeless tobacco. He reports that he drinks about 1.8 oz of alcohol per week. He reports that he has current or past drug history. Drug: Marijuana. Frequency: 4.00 times per week.    Family History: family history includes Cancer in his brother, brother, and father; Diabetes in his mother.    Medications:   Current Outpatient Medications   Medication Sig    amLODIPine (NORVASC) 5 MG tablet Take 1 tablet (5 mg total) by mouth once daily.    diclofenac sodium (VOLTAREN) 1 % Gel Apply 2 g topically once daily. As needed.    ferrous sulfate 325 (65 FE) MG EC tablet Take by mouth.    HYDROcodone-acetaminophen (NORCO) 5-325 mg per tablet Take 1 tablet by mouth every 6 (six) hours as needed for Pain. Please start with 1/2 tablet every 6 hours for severe pain ONLY    multivitamin capsule Take 1 capsule by mouth once daily.    solifenacin (VESICARE) 10 MG tablet Take 1 tablet (10 mg total) by mouth once daily.     No current facility-administered medications for this visit.        Allergies: Patient has No Known Allergies.'    Review of Systems   Constitutional: Negative for activity change, appetite change, chills, diaphoresis, fatigue, fever and unexpected weight change.   HENT: Positive for hearing loss.  Negative for congestion, ear discharge, ear pain, facial swelling, nosebleeds, postnasal drip, rhinorrhea, sinus pressure, sneezing, sore throat, tinnitus, trouble swallowing and voice change.    Eyes: Negative for photophobia, pain, discharge, redness and visual disturbance.   Respiratory: Negative for cough, chest tightness, shortness of breath and wheezing.    Cardiovascular: Negative for chest pain and palpitations.   Gastrointestinal: Negative for abdominal pain, constipation, diarrhea and nausea.   Genitourinary: Negative for dysuria and frequency.   Musculoskeletal: Negative for arthralgias, back pain, gait problem, joint swelling, myalgias, neck pain and neck stiffness.   Skin: Negative for color change, pallor and rash.   Neurological: Negative for dizziness, tremors, seizures, syncope, facial asymmetry, speech difficulty, weakness, light-headedness, numbness and headaches.   Hematological: Negative for adenopathy. Does not bruise/bleed easily.   Psychiatric/Behavioral: Negative for agitation, confusion, decreased concentration, dysphoric mood and sleep disturbance. The patient is not nervous/anxious and is not hyperactive.        Objective:      Physical Exam   Constitutional: He is oriented to person, place, and time. He appears well-developed and well-nourished.  Non-toxic appearance. He does not have a sickly appearance. He does not appear ill. No distress.   HENT:   Head: Not macrocephalic and not microcephalic. Head is without raccoon's eyes, without Timmons's sign, without abrasion, without contusion, without laceration, without right periorbital erythema and without left periorbital erythema. Hair is normal.   Right Ear: No lacerations. No drainage, swelling or tenderness. No foreign bodies. No mastoid tenderness. Tympanic membrane is not injected, not scarred, not perforated, not erythematous, not retracted and not bulging. Tympanic membrane mobility is normal. No middle ear effusion. No  hemotympanum. Decreased hearing is noted.   Left Ear: No lacerations. No drainage, swelling or tenderness. No foreign bodies. No mastoid tenderness. Tympanic membrane is not injected, not scarred, not perforated, not erythematous, not retracted and not bulging. Tympanic membrane mobility is normal.  No middle ear effusion. No hemotympanum. Decreased hearing is noted.   Nose: No mucosal edema, rhinorrhea, nose lacerations, sinus tenderness, nasal deformity, septal deviation or nasal septal hematoma. No epistaxis.  No foreign bodies. Right sinus exhibits no maxillary sinus tenderness. Left sinus exhibits no maxillary sinus tenderness.   Mouth/Throat: Uvula is midline and oropharynx is clear and moist. Mucous membranes are not pale, not dry and not cyanotic. No oral lesions. No trismus in the jaw. Normal dentition. No dental abscesses, uvula swelling, lacerations or dental caries. No oropharyngeal exudate or tonsillar abscesses.       R CI.    Procedure Note:    Patient was brought to the minor procedure room and using the operating microscope the right ear canal  was cleaned of ceruminous debris. There was a significant cerumen impaction.  The forceps and suction were both used to perform this. Tympanic membrane intact. Pt tolerated well. There were no complications.       Eyes: Right eye exhibits no chemosis, no discharge and no exudate. No foreign body present in the right eye. Left eye exhibits no chemosis, no discharge and no exudate. No foreign body present in the left eye. Right conjunctiva is not injected. Right conjunctiva has no hemorrhage. Left conjunctiva is not injected. Left conjunctiva has no hemorrhage. No scleral icterus. Right eye exhibits normal extraocular motion and no nystagmus. Left eye exhibits normal extraocular motion and no nystagmus. Right pupil is round and reactive. Left pupil is round and reactive. Pupils are equal.   Neck: No JVD present. No tracheal tenderness and no muscular tenderness  present. No neck rigidity. No tracheal deviation, no edema, no erythema and normal range of motion present. No thyroid mass and no thyromegaly present.   Cardiovascular: Normal rate and regular rhythm.   Pulmonary/Chest: Breath sounds normal. No accessory muscle usage or stridor. No apnea, no tachypnea and no bradypnea. No respiratory distress.   Abdominal: Soft. Normal appearance.   Musculoskeletal: Normal range of motion.   Lymphadenopathy:        Head (right side): No submental, no submandibular, no tonsillar, no preauricular, no posterior auricular and no occipital adenopathy present.        Head (left side): No submental, no submandibular, no tonsillar, no preauricular, no posterior auricular and no occipital adenopathy present.     He has no cervical adenopathy.        Right cervical: No superficial cervical, no deep cervical and no posterior cervical adenopathy present.       Left cervical: No superficial cervical, no deep cervical and no posterior cervical adenopathy present.   Neurological: He is alert and oriented to person, place, and time. He is not disoriented. He displays no atrophy and no tremor. No cranial nerve deficit or sensory deficit. He exhibits normal muscle tone. He displays no seizure activity.   Skin: No abrasion, no bruising, no burn, no ecchymosis, no laceration, no lesion and no rash noted. He is not diaphoretic. No erythema. No pallor.   Psychiatric: His behavior is normal. Thought content normal. His mood appears not anxious. His affect is not angry, not blunt, not labile and not inappropriate. His speech is not rapid and/or pressured, not delayed, not tangential and not slurred. He is not agitated, not aggressive, not hyperactive, not withdrawn and not combative. Cognition and memory are not impaired. He does not exhibit a depressed mood. He is communicative. He exhibits normal recent memory and normal remote memory.               Assessment:       1. Presbycusis of both ears    2.  Impacted cerumen of left ear        Plan:         Patient to see Audiology for hearing aid evaluation.    Declined.    F/U prn.

## 2019-07-15 NOTE — LETTER
July 15, 2019      Lupe Lezama MD  [address] [facility] [place] [address]           [facility] - Otorhinolaryngology  [address]  [place] [address]  Phone: [phone]  Fax: [phone]          Patient: Brent Alatorre   MR Number: [id number]   YOB: 1932   Date of Visit: 7/15/2019       Dear Dr. Lupe Lezama:    Thank you for referring Brent Alatorre to me for evaluation. Attached you will find relevant portions of my assessment and plan of care.    If you have questions, please do not hesitate to call me. I look forward to following Brent Alatorre along with you.    Sincerely,    Dusty Roberts MD    Enclosure  CC:  No Recipients    If you would like to receive this communication electronically, please contact [email or web address] or [phone] to request more information on [facility] access.    For providers and/or their staff who would like to refer a patient to [facility], please contact us through our one-stop-shop provider referral line, [facility], [place], at [phone].    If you feel you have received this communication in error or would no longer like to receive these types of communications, please e-mail [email or web address]

## 2019-07-15 NOTE — PROGRESS NOTES
Brent Alatorre was seen in the clinic today for an audiological evaluation.   Brent Alatorre reported bilateral hearing loss.    Audiological testing revealed a mild to severe sensorineural hearing loss for each ear.  A speech reception threshold was obtained at 45 dBHL for the right ear and at 40 dBHL for the left ear.  Speech discrimination was 60% for each ear.      Tympanometry testing revealed a Type A tympanogram for each ear.    Recommendations:  1. Otologic evaluation  2. Annual audiological evaluation  3. Hearing protection when in noise   4. Hearing aid consultation

## 2019-10-08 ENCOUNTER — LAB VISIT (OUTPATIENT)
Dept: LAB | Facility: HOSPITAL | Age: 84
End: 2019-10-08
Attending: HOSPITALIST
Payer: MEDICARE

## 2019-10-08 ENCOUNTER — OFFICE VISIT (OUTPATIENT)
Dept: INTERNAL MEDICINE | Facility: CLINIC | Age: 84
End: 2019-10-08
Payer: MEDICARE

## 2019-10-08 VITALS
HEIGHT: 64 IN | TEMPERATURE: 98 F | BODY MASS INDEX: 20.74 KG/M2 | RESPIRATION RATE: 16 BRPM | HEART RATE: 60 BPM | DIASTOLIC BLOOD PRESSURE: 80 MMHG | WEIGHT: 121.5 LBS | SYSTOLIC BLOOD PRESSURE: 150 MMHG

## 2019-10-08 DIAGNOSIS — N18.30 STAGE 3 CHRONIC KIDNEY DISEASE: ICD-10-CM

## 2019-10-08 DIAGNOSIS — D64.9 ANEMIA, UNSPECIFIED TYPE: ICD-10-CM

## 2019-10-08 DIAGNOSIS — R68.89 FORGETFULNESS: ICD-10-CM

## 2019-10-08 DIAGNOSIS — R26.81 UNSTEADINESS ON FEET: ICD-10-CM

## 2019-10-08 DIAGNOSIS — R68.89 FORGETFULNESS: Primary | ICD-10-CM

## 2019-10-08 DIAGNOSIS — B02.9 HERPES ZOSTER WITHOUT COMPLICATION: ICD-10-CM

## 2019-10-08 DIAGNOSIS — I10 ESSENTIAL HYPERTENSION: ICD-10-CM

## 2019-10-08 DIAGNOSIS — M19.072 PRIMARY OSTEOARTHRITIS OF LEFT FOOT: ICD-10-CM

## 2019-10-08 LAB
ANION GAP SERPL CALC-SCNC: 9 MMOL/L (ref 8–16)
BASOPHILS # BLD AUTO: 0.02 K/UL (ref 0–0.2)
BASOPHILS NFR BLD: 0.6 % (ref 0–1.9)
BUN SERPL-MCNC: 19 MG/DL (ref 8–23)
CALCIUM SERPL-MCNC: 9.4 MG/DL (ref 8.7–10.5)
CHLORIDE SERPL-SCNC: 113 MMOL/L (ref 95–110)
CO2 SERPL-SCNC: 18 MMOL/L (ref 23–29)
CREAT SERPL-MCNC: 1.3 MG/DL (ref 0.5–1.4)
DIFFERENTIAL METHOD: ABNORMAL
EOSINOPHIL # BLD AUTO: 0.2 K/UL (ref 0–0.5)
EOSINOPHIL NFR BLD: 4.5 % (ref 0–8)
ERYTHROCYTE [DISTWIDTH] IN BLOOD BY AUTOMATED COUNT: 17.6 % (ref 11.5–14.5)
EST. GFR  (AFRICAN AMERICAN): 56.7 ML/MIN/1.73 M^2
EST. GFR  (NON AFRICAN AMERICAN): 49.1 ML/MIN/1.73 M^2
FOLATE SERPL-MCNC: 15.5 NG/ML (ref 4–24)
GLUCOSE SERPL-MCNC: 83 MG/DL (ref 70–110)
HCT VFR BLD AUTO: 35.6 % (ref 40–54)
HGB BLD-MCNC: 11 G/DL (ref 14–18)
IMM GRANULOCYTES # BLD AUTO: 0.01 K/UL (ref 0–0.04)
IMM GRANULOCYTES NFR BLD AUTO: 0.3 % (ref 0–0.5)
LYMPHOCYTES # BLD AUTO: 1.3 K/UL (ref 1–4.8)
LYMPHOCYTES NFR BLD: 35.9 % (ref 18–48)
MCH RBC QN AUTO: 27.8 PG (ref 27–31)
MCHC RBC AUTO-ENTMCNC: 30.9 G/DL (ref 32–36)
MCV RBC AUTO: 90 FL (ref 82–98)
MONOCYTES # BLD AUTO: 0.4 K/UL (ref 0.3–1)
MONOCYTES NFR BLD: 10.9 % (ref 4–15)
NEUTROPHILS # BLD AUTO: 1.7 K/UL (ref 1.8–7.7)
NEUTROPHILS NFR BLD: 47.8 % (ref 38–73)
NRBC BLD-RTO: 0 /100 WBC
PLATELET # BLD AUTO: 220 K/UL (ref 150–350)
PMV BLD AUTO: 9.1 FL (ref 9.2–12.9)
POTASSIUM SERPL-SCNC: 4.2 MMOL/L (ref 3.5–5.1)
RBC # BLD AUTO: 3.95 M/UL (ref 4.6–6.2)
SODIUM SERPL-SCNC: 140 MMOL/L (ref 136–145)
TSH SERPL DL<=0.005 MIU/L-ACNC: 1.09 UIU/ML (ref 0.4–4)
VIT B12 SERPL-MCNC: 773 PG/ML (ref 210–950)
WBC # BLD AUTO: 3.59 K/UL (ref 3.9–12.7)

## 2019-10-08 PROCEDURE — 82746 ASSAY OF FOLIC ACID SERUM: CPT

## 2019-10-08 PROCEDURE — 99999 PR PBB SHADOW E&M-EST. PATIENT-LVL III: ICD-10-PCS | Mod: PBBFAC,,, | Performed by: HOSPITALIST

## 2019-10-08 PROCEDURE — 36415 COLL VENOUS BLD VENIPUNCTURE: CPT | Mod: PO

## 2019-10-08 PROCEDURE — 90662 IIV NO PRSV INCREASED AG IM: CPT | Mod: PBBFAC,PO

## 2019-10-08 PROCEDURE — 85025 COMPLETE CBC W/AUTO DIFF WBC: CPT

## 2019-10-08 PROCEDURE — 82607 VITAMIN B-12: CPT

## 2019-10-08 PROCEDURE — 99213 OFFICE O/P EST LOW 20 MIN: CPT | Mod: PBBFAC,PO,25 | Performed by: HOSPITALIST

## 2019-10-08 PROCEDURE — 80048 BASIC METABOLIC PNL TOTAL CA: CPT

## 2019-10-08 PROCEDURE — 99214 OFFICE O/P EST MOD 30 MIN: CPT | Mod: S$PBB,,, | Performed by: HOSPITALIST

## 2019-10-08 PROCEDURE — 84443 ASSAY THYROID STIM HORMONE: CPT

## 2019-10-08 PROCEDURE — 99999 PR PBB SHADOW E&M-EST. PATIENT-LVL III: CPT | Mod: PBBFAC,,, | Performed by: HOSPITALIST

## 2019-10-08 PROCEDURE — 99214 PR OFFICE/OUTPT VISIT, EST, LEVL IV, 30-39 MIN: ICD-10-PCS | Mod: S$PBB,,, | Performed by: HOSPITALIST

## 2019-10-08 RX ORDER — VALACYCLOVIR HYDROCHLORIDE 1 G/1
1000 TABLET, FILM COATED ORAL 3 TIMES DAILY
Qty: 21 TABLET | Refills: 0 | Status: SHIPPED | OUTPATIENT
Start: 2019-10-08 | End: 2021-08-19 | Stop reason: ALTCHOICE

## 2019-10-08 NOTE — PROGRESS NOTES
Subjective:     @Patient ID: Brent Alatorre is a 87 y.o. male.    Chief Complaint: Rash and Foot Pain    HPI  87-year-old male presents for evaluation of forgetfulness and rash.  Patient reports that over the past 6 months he has noticed he has had memory issues specifically short-term memory.  States that his wife will tell in things to do and if he is driving to go do them sometimes he will have to pull over to remember when his wife wanted him  from the store.  States that he has not had any issues remembering names of family members or faces.  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  Does reported concerns him.  Also reports having a rash of his right upper back the past 2 weeks describes it is itching.  Denies any fevers or chills.  Also reports he has had some left foot pain and has made him unsteady on his feet.  Reports that his wife bought him arch supports which have helped.     Review of Systems   Constitutional: Negative for chills and fever.   HENT: Negative for congestion and sore throat.    Eyes: Negative for pain and visual disturbance.   Respiratory: Negative for cough and shortness of breath.    Cardiovascular: Negative for chest pain and leg swelling.   Gastrointestinal: Negative for abdominal pain, nausea and vomiting.   Endocrine: Negative for polydipsia and polyuria.   Genitourinary: Negative for difficulty urinating and dysuria.   Musculoskeletal: Positive for arthralgias and gait problem. Negative for back pain.   Skin: Positive for rash. Negative for wound.   Neurological: Negative for weakness and headaches.   Psychiatric/Behavioral: Positive for confusion. Negative for agitation.     Past medical history, surgical history, and family medical history reviewed and updated as appropriate.    Medications and allergies reviewed.     Objective:     Vitals:    10/08/19 1048 10/08/19 1151   BP: (!) 149/80 (!) 150/80   Pulse: 60    Resp: 16    Temp: 98 °F (36.7 °C)   "  TempSrc: Oral    Weight: 55.1 kg (121 lb 7.6 oz)    Height: 5' 4" (1.626 m)      Body mass index is 20.85 kg/m².  Physical Exam   Constitutional: He is oriented to person, place, and time. He appears well-developed and well-nourished. No distress.   HENT:   Head: Normocephalic and atraumatic.   Mouth/Throat: Oropharynx is clear and moist. No oropharyngeal exudate.   Eyes: Conjunctivae are normal. Right eye exhibits no discharge. Left eye exhibits no discharge.   Neck: Normal range of motion. Neck supple.   Cardiovascular: Normal rate, regular rhythm, normal heart sounds and intact distal pulses. Exam reveals no friction rub.   Pulmonary/Chest: Effort normal and breath sounds normal.   Abdominal: Soft. Bowel sounds are normal. He exhibits no distension. There is no tenderness.   Musculoskeletal: Normal range of motion. He exhibits no edema.   Neurological: He is alert and oriented to person, place, and time.   Skin: Skin is warm and dry.   Psychiatric: He has a normal mood and affect. His behavior is normal.   Vitals reviewed.      Lab Results   Component Value Date    WBC 2.53 (L) 06/19/2019    HGB 9.1 (L) 06/19/2019    HCT 32.3 (L) 06/19/2019     06/19/2019    CHOL 151 06/19/2019    TRIG 37 06/19/2019    HDL 72 06/19/2019    ALT 11 06/19/2019    AST 17 06/19/2019     06/19/2019    K 4.6 06/19/2019     (H) 06/19/2019    CREATININE 1.2 06/19/2019    BUN 19 06/19/2019    CO2 17 (L) 06/19/2019    TSH 1.376 06/19/2019    PSA <0.01 06/19/2019    HGBA1C 5.6 06/19/2018       Assessment:     1. Forgetfulness    2. Herpes zoster without complication    3. Primary osteoarthritis of left foot    4. Essential hypertension    5. Anemia, unspecified type    6. Stage 3 chronic kidney disease    7. Unsteadiness on feet       Plan:   Brent was seen today for rash and foot pain.    Diagnoses and all orders for this visit:    Forgetfulness  - Scored 25/30 on MMSE. Does not appear to be consistent with MCI. " Likely normal aging. However did recommend for neurology evaluation, but pt declines at this time. Will notify MD if sx worsen. Will also check  B12, folate and tsh.   -     Vitamin B12; Future  -     Folate; Future  -     Basic metabolic panel; Future    Herpes zoster without complication        - Valtrex     Primary osteoarthritis of left foot         - Sx improved currently. Ok for tylenol prn    Essential hypertension  - BP uncontrolled today which is unusual. Continue amlodipine will RTC in 2 weeks for bp check.   -     TSH; Future  -     Basic metabolic panel; Future    Anemia, unspecified type  -     Vitamin B12; Future  -     Folate; Future  -     Basic metabolic panel; Future  -     CBC auto differential; Future    Stage 3 chronic kidney disease  -     Basic metabolic panel; Future  -     CBC auto differential; Future    Unsteadiness on feet   - Likely due to arthritis   -     Vitamin B12; Future  -     Folate; Future    Other orders  -     valACYclovir (VALTREX) 1000 MG tablet; Take 1 tablet (1,000 mg total) by mouth 3 (three) times daily. for 7 days  -     Influenza - High Dose (65+) (PF) (IM)          Follow up in about 2 weeks (around 10/22/2019), or if symptoms worsen or fail to improve, for bp check.    Lupe Lezama MD  Internal Medicine    10/8/2019

## 2019-10-30 ENCOUNTER — PATIENT MESSAGE (OUTPATIENT)
Dept: INTERNAL MEDICINE | Facility: CLINIC | Age: 84
End: 2019-10-30

## 2019-10-30 DIAGNOSIS — N18.30 STAGE 3 CHRONIC KIDNEY DISEASE: Primary | ICD-10-CM

## 2019-10-30 DIAGNOSIS — E87.20 METABOLIC ACIDOSIS: ICD-10-CM

## 2019-10-30 RX ORDER — SODIUM BICARBONATE 650 MG/1
650 TABLET ORAL 2 TIMES DAILY
Qty: 60 TABLET | Refills: 11 | Status: SHIPPED | OUTPATIENT
Start: 2019-10-30 | End: 2021-12-16 | Stop reason: SDUPTHER

## 2020-01-17 ENCOUNTER — LAB VISIT (OUTPATIENT)
Dept: LAB | Facility: HOSPITAL | Age: 85
End: 2020-01-17
Attending: INTERNAL MEDICINE
Payer: COMMERCIAL

## 2020-01-17 ENCOUNTER — OFFICE VISIT (OUTPATIENT)
Dept: INTERNAL MEDICINE | Facility: CLINIC | Age: 85
End: 2020-01-17
Payer: COMMERCIAL

## 2020-01-17 VITALS
HEART RATE: 64 BPM | BODY MASS INDEX: 20.82 KG/M2 | WEIGHT: 121.94 LBS | RESPIRATION RATE: 18 BRPM | SYSTOLIC BLOOD PRESSURE: 138 MMHG | HEIGHT: 64 IN | DIASTOLIC BLOOD PRESSURE: 78 MMHG | TEMPERATURE: 98 F

## 2020-01-17 DIAGNOSIS — N18.30 KIDNEY DISEASE, CHRONIC, STAGE III (GFR 30-59 ML/MIN): Chronic | ICD-10-CM

## 2020-01-17 DIAGNOSIS — G89.29 CHRONIC MIDLINE LOW BACK PAIN WITHOUT SCIATICA: ICD-10-CM

## 2020-01-17 DIAGNOSIS — G89.29 CHRONIC MIDLINE LOW BACK PAIN WITHOUT SCIATICA: Primary | ICD-10-CM

## 2020-01-17 DIAGNOSIS — M54.50 CHRONIC MIDLINE LOW BACK PAIN WITHOUT SCIATICA: ICD-10-CM

## 2020-01-17 DIAGNOSIS — M54.50 CHRONIC MIDLINE LOW BACK PAIN WITHOUT SCIATICA: Primary | ICD-10-CM

## 2020-01-17 PROCEDURE — 80048 BASIC METABOLIC PNL TOTAL CA: CPT

## 2020-01-17 PROCEDURE — 99213 OFFICE O/P EST LOW 20 MIN: CPT | Mod: PBBFAC,PO | Performed by: INTERNAL MEDICINE

## 2020-01-17 PROCEDURE — 99999 PR PBB SHADOW E&M-EST. PATIENT-LVL III: CPT | Mod: PBBFAC,,, | Performed by: INTERNAL MEDICINE

## 2020-01-17 PROCEDURE — 36415 COLL VENOUS BLD VENIPUNCTURE: CPT | Mod: PO

## 2020-01-17 PROCEDURE — 99214 PR OFFICE/OUTPT VISIT, EST, LEVL IV, 30-39 MIN: ICD-10-PCS | Mod: S$GLB,,, | Performed by: INTERNAL MEDICINE

## 2020-01-17 PROCEDURE — 99214 OFFICE O/P EST MOD 30 MIN: CPT | Mod: S$GLB,,, | Performed by: INTERNAL MEDICINE

## 2020-01-17 PROCEDURE — 99999 PR PBB SHADOW E&M-EST. PATIENT-LVL III: ICD-10-PCS | Mod: PBBFAC,,, | Performed by: INTERNAL MEDICINE

## 2020-01-17 RX ORDER — DICLOFENAC SODIUM 10 MG/G
2 GEL TOPICAL 3 TIMES DAILY
Qty: 200 G | Refills: 11 | Status: SHIPPED | OUTPATIENT
Start: 2020-01-17 | End: 2021-12-16 | Stop reason: SDUPTHER

## 2020-01-17 NOTE — PROGRESS NOTES
Subjective:       Patient ID: Brent Alatorre is a 87 y.o. male.    Chief Complaint: Back Pain and Medication Refill    HPI     87-year-old male here for evaluation of lower back pain.  It is in the top of his lower back.  This has been going on the last couple of weeks to a month.  It is a throbbing pain.  It is intermittent.  It is better with tylenol.  He took aleve last night.  He felt better this am.  He recalls no trauma to his back.  He recalls nothing that makes his back worse.  It does not move around.  It sits there.  Coughing makes it worse.  He has no known back injuries.    Review of Systems    Objective:      Physical Exam   Constitutional: He is oriented to person, place, and time. He appears well-developed and well-nourished.   HENT:   Head: Normocephalic and atraumatic.   Mouth/Throat: No oropharyngeal exudate.   Eyes: Pupils are equal, round, and reactive to light. EOM are normal. Right eye exhibits no discharge. Left eye exhibits no discharge. No scleral icterus.   Neck: Normal range of motion. Neck supple. No tracheal deviation present. No thyromegaly present.   Cardiovascular: Normal rate, regular rhythm and normal heart sounds. Exam reveals no gallop and no friction rub.   No murmur heard.  Pulmonary/Chest: Effort normal and breath sounds normal. No respiratory distress. He has no wheezes. He has no rales. He exhibits no tenderness.   Abdominal: Soft. Bowel sounds are normal. He exhibits no distension and no mass. There is no tenderness. There is no rebound and no guarding.   Musculoskeletal: Normal range of motion. He exhibits no edema or tenderness.   Neurological: He is alert and oriented to person, place, and time.   Skin: Skin is warm and dry. No rash noted. No erythema. No pallor.   Psychiatric: He has a normal mood and affect. His behavior is normal.   Vitals reviewed.      Assessment:       1. Chronic midline low back pain without sciatica    2. Kidney disease, chronic, stage III (GFR  30-59 ml/min), eGFR 56        Plan:       1.  Diclofenac gel refilled.  Think this is musculoskeletal.  If no relief in the next week or 2, call back for physical therapy.    2.  Check BMP.

## 2020-01-18 LAB
ANION GAP SERPL CALC-SCNC: 10 MMOL/L (ref 8–16)
BUN SERPL-MCNC: 25 MG/DL (ref 8–23)
CALCIUM SERPL-MCNC: 9.5 MG/DL (ref 8.7–10.5)
CHLORIDE SERPL-SCNC: 113 MMOL/L (ref 95–110)
CO2 SERPL-SCNC: 17 MMOL/L (ref 23–29)
CREAT SERPL-MCNC: 1.3 MG/DL (ref 0.5–1.4)
EST. GFR  (AFRICAN AMERICAN): 56.7 ML/MIN/1.73 M^2
EST. GFR  (NON AFRICAN AMERICAN): 49.1 ML/MIN/1.73 M^2
GLUCOSE SERPL-MCNC: 84 MG/DL (ref 70–110)
POTASSIUM SERPL-SCNC: 4.6 MMOL/L (ref 3.5–5.1)
SODIUM SERPL-SCNC: 140 MMOL/L (ref 136–145)

## 2020-03-11 ENCOUNTER — TELEPHONE (OUTPATIENT)
Dept: INTERNAL MEDICINE | Facility: CLINIC | Age: 85
End: 2020-03-11

## 2020-03-11 NOTE — TELEPHONE ENCOUNTER
----- Message from Felicity Harper sent at 3/11/2020 10:23 AM CDT -----  Type:  Needs Medical Advice    Who Called: CHRIS       Would the patient rather a call back or a response via [facility]? CALL BACK     Best Call Back Number: [phone]    Additional Information: CHRIS WOULD LIKE A COPY OF HIS SHOT RECORDS FAXED [phone] ATTN BOGDAN JANE. PT NEEDS IT IN THE NEXT 30 MINUTES

## 2020-06-25 ENCOUNTER — TELEPHONE (OUTPATIENT)
Dept: INTERNAL MEDICINE | Facility: CLINIC | Age: 85
End: 2020-06-25

## 2020-06-25 DIAGNOSIS — H91.93 BILATERAL HEARING LOSS, UNSPECIFIED HEARING LOSS TYPE: Primary | ICD-10-CM

## 2020-06-25 NOTE — TELEPHONE ENCOUNTER
----- Message from Nazia Dc sent at 6/25/2020  3:03 PM CDT -----  Regarding: self/[phone]  Diabetic or Medical Supplies.  What supplies are needed: Hearing aid  What is the brand name of the supplies:   Is this a refill or new prescription:  New rx  Who prescribed the supplies:  Dr Lezama  Pharmacy or company name, phone # and location:    Comments:

## 2020-07-16 ENCOUNTER — OFFICE VISIT (OUTPATIENT)
Dept: INTERNAL MEDICINE | Facility: CLINIC | Age: 85
End: 2020-07-16
Payer: COMMERCIAL

## 2020-07-16 VITALS
BODY MASS INDEX: 20.78 KG/M2 | DIASTOLIC BLOOD PRESSURE: 62 MMHG | SYSTOLIC BLOOD PRESSURE: 132 MMHG | TEMPERATURE: 98 F | OXYGEN SATURATION: 98 % | WEIGHT: 121.69 LBS | HEIGHT: 64 IN | RESPIRATION RATE: 16 BRPM | HEART RATE: 72 BPM

## 2020-07-16 DIAGNOSIS — Z12.12 SCREENING FOR COLORECTAL CANCER: ICD-10-CM

## 2020-07-16 DIAGNOSIS — N18.30 STAGE 3 CHRONIC KIDNEY DISEASE: ICD-10-CM

## 2020-07-16 DIAGNOSIS — I10 ESSENTIAL HYPERTENSION: ICD-10-CM

## 2020-07-16 DIAGNOSIS — R68.89 FORGETFULNESS: ICD-10-CM

## 2020-07-16 DIAGNOSIS — H91.93 BILATERAL HEARING LOSS, UNSPECIFIED HEARING LOSS TYPE: ICD-10-CM

## 2020-07-16 DIAGNOSIS — Z00.00 ENCOUNTER FOR PREVENTIVE HEALTH EXAMINATION: Primary | ICD-10-CM

## 2020-07-16 DIAGNOSIS — Z12.5 PROSTATE CANCER SCREENING: ICD-10-CM

## 2020-07-16 DIAGNOSIS — R10.9 LEFT LATERAL ABDOMINAL PAIN: ICD-10-CM

## 2020-07-16 DIAGNOSIS — E78.5 DYSLIPIDEMIA: ICD-10-CM

## 2020-07-16 DIAGNOSIS — Z12.11 SCREENING FOR COLORECTAL CANCER: ICD-10-CM

## 2020-07-16 PROCEDURE — 99999 PR PBB SHADOW E&M-EST. PATIENT-LVL V: CPT | Mod: PBBFAC,,, | Performed by: HOSPITALIST

## 2020-07-16 PROCEDURE — 99397 PER PM REEVAL EST PAT 65+ YR: CPT | Mod: S$GLB,,, | Performed by: HOSPITALIST

## 2020-07-16 PROCEDURE — 99397 PR PREVENTIVE VISIT,EST,65 & OVER: ICD-10-PCS | Mod: S$GLB,,, | Performed by: HOSPITALIST

## 2020-07-16 PROCEDURE — 99999 PR PBB SHADOW E&M-EST. PATIENT-LVL V: ICD-10-PCS | Mod: PBBFAC,,, | Performed by: HOSPITALIST

## 2020-07-16 RX ORDER — ERGOCALCIFEROL 1.25 MG/1
50000 CAPSULE ORAL
COMMUNITY

## 2020-07-16 RX ORDER — ACETAMINOPHEN 500 MG
500 TABLET ORAL
COMMUNITY

## 2020-07-16 RX ORDER — ALENDRONATE SODIUM 70 MG/1
70 TABLET ORAL
COMMUNITY
End: 2021-08-19

## 2020-07-16 RX ORDER — ZOSTER VACCINE RECOMBINANT, ADJUVANTED 50 MCG/0.5
0.5 KIT INTRAMUSCULAR ONCE
Qty: 0.5 ML | Refills: 0 | Status: SHIPPED | OUTPATIENT
Start: 2020-07-16 | End: 2020-07-16

## 2020-07-16 RX ORDER — LISINOPRIL 5 MG/1
1 TABLET ORAL
COMMUNITY
Start: 2020-01-14 | End: 2021-05-01 | Stop reason: SDUPTHER

## 2020-07-16 NOTE — PROGRESS NOTES
Subjective:     @Patient ID: Brent Alatorre is a 88 y.o. male.    Chief Complaint: Abdominal Pain (LLQ; acute severe pain) and Memory Loss (Short memory lapse; pt state doesn't happen everyday, maybe once or twice a week)    HPI     87 yo male presents for annual:    1. Memory: reports short term memory issues per wife.   2. Abdominal pain: L side intermittent, last episode few weeks ago. Sharp pain lasts for 1-2 min. Comes anytime. Notices it improves with bending forward. Feels like a nagging pain. Has b.m. daily. No fever/chills. States feels like musculoskeletal in nature. No pain currently.   3. Reports had f/u visit at [facility]. Seen by hematology for leukopenia. No further workup per them at this time.   4. Endorses hearing loss. Has had hearing aids before      Lipid disorders/ASCVD risk (ages >/= 45 or >/= 20 if increased risk ): ordered  DM (>45y yearly or if obese, HTN): A1c   Hepatitis C (one time if born between 0920-2388): n/a     Eye exam: Due   Colorectal Cancer (normal risk 50-75yr): Colonoscopy n/a  Prostate (per discussion with patient starting at 50y or 45y if high risk): ordered  DEXA (F>64 yo, M >69yo, M&F 50-68 yo with risk factors (smoking, previous fx, wt <70kg; etoh abuse, chronic steroids, RA)):       Vaccines:   Influenza (yearly) ; n;a  Tetanus (every 10 yrs - 1st tdap) done  PPSV23(>66yo or <65 w/ lung dz, smoking, DM) done  PCV13 (> 65 or <65 w/ immunocompromised)done   Zoster (>61yo): 2nd shot due     Exercise:  not much since pandemic  Diet: regular       Review of Systems   Constitutional: Negative for chills and fever.   HENT: Positive for hearing loss. Negative for congestion and sore throat.    Eyes: Negative for pain and visual disturbance.   Respiratory: Negative for cough and shortness of breath.    Cardiovascular: Negative for chest pain and leg swelling.   Gastrointestinal: Positive for abdominal pain (none today. intermittent). Negative for nausea and vomiting.   Endocrine:  "Negative for polydipsia and polyuria.   Genitourinary: Negative for difficulty urinating and dysuria.   Musculoskeletal: Negative for arthralgias and back pain.   Skin: Negative for rash and wound.   Neurological: Negative for weakness and headaches.   Psychiatric/Behavioral: Negative for agitation.        + memory impairment- short term     Past medical history, surgical history, and family medical history reviewed and updated as appropriate.    Medications and allergies reviewed.     Objective:     Vitals:    07/16/20 0932   BP: 132/62   BP Location: Right arm   Patient Position: Sitting   BP Method: Medium (Manual)   Pulse: 72   Resp: 16   Temp: 97.9 °F (36.6 °C)   TempSrc: Oral   SpO2: 98%   Weight: 55.2 kg (121 lb 11.1 oz)   Height: 5' 4" (1.626 m)     Body mass index is 20.89 kg/m².  Physical Exam  Vitals signs reviewed.   Constitutional:       General: He is not in acute distress.     Appearance: He is well-developed.   HENT:      Head: Normocephalic and atraumatic.      Right Ear: Tympanic membrane, ear canal and external ear normal.      Left Ear: Tympanic membrane, ear canal and external ear normal.      Mouth/Throat:      Mouth: Mucous membranes are moist.      Pharynx: No oropharyngeal exudate.   Eyes:      General:         Right eye: No discharge.         Left eye: No discharge.      Conjunctiva/sclera: Conjunctivae normal.   Neck:      Musculoskeletal: Normal range of motion and neck supple.   Cardiovascular:      Rate and Rhythm: Normal rate and regular rhythm.      Heart sounds: Normal heart sounds. No friction rub.   Pulmonary:      Effort: Pulmonary effort is normal.      Breath sounds: Normal breath sounds.   Abdominal:      General: Bowel sounds are normal. There is no distension.      Palpations: Abdomen is soft.      Tenderness: There is no abdominal tenderness.   Musculoskeletal: Normal range of motion.      Right lower leg: No edema.      Left lower leg: No edema.   Lymphadenopathy:      " Cervical: No cervical adenopathy.   Skin:     General: Skin is warm and dry.   Neurological:      Mental Status: He is alert and oriented to person, place, and time.   Psychiatric:         Mood and Affect: Mood normal.         Behavior: Behavior normal.         Lab Results   Component Value Date    WBC 3.59 (L) 10/08/2019    HGB 11.0 (L) 10/08/2019    HCT 35.6 (L) 10/08/2019     10/08/2019    CHOL 151 06/19/2019    TRIG 37 06/19/2019    HDL 72 06/19/2019    ALT 11 06/19/2019    AST 17 06/19/2019     01/17/2020    K 4.6 01/17/2020     (H) 01/17/2020    CREATININE 1.3 01/17/2020    BUN 25 (H) 01/17/2020    CO2 17 (L) 01/17/2020    TSH 1.092 10/08/2019    PSA <0.01 06/19/2019    HGBA1C 5.6 06/19/2018       Assessment:     1. Encounter for preventive health examination    2. Essential hypertension    3. Stage 3 chronic kidney disease    4. Forgetfulness    5. Prostate cancer screening    6. Dyslipidemia    7. Bilateral hearing loss, unspecified hearing loss type    8. Screening for colorectal cancer    9. Left lateral abdominal pain      Plan:   Brent was seen today for abdominal pain and memory loss.    Diagnoses and all orders for this visit:    Encounter for preventive health examination        - Labs ordered. shingrix rx ordered    Essential hypertension  -     Comprehensive metabolic panel; Future  -     CBC auto differential; Future  -     TSH; Future    Stage 3 chronic kidney disease  -     Vitamin D; Future  -     Urinalysis; Future  -     Microalbumin/creatinine urine ratio; Future    Forgetfulness  -     Ambulatory referral/consult to Neurology; Future  -     Vitamin B12; Future  -     Folate; Future    Prostate cancer screening  -     PSA, Screening; Future    Dyslipidemia  -     Lipid Panel; Future    Bilateral hearing loss, unspecified hearing loss type  -     Ambulatory referral/consult to ENT; Future    Screening for colorectal cancer  -     Fecal Immunochemical Test (iFOBT);  Future    Left lateral abdominal pain        - Unclear etiology. None today. Abdominal exam benign. Counseled to monitor sx and notify MD if worsens    Other orders  -     varicella-zoster [id number], PF, (SHINGRIX, PF,) 50 mcg/0.5 mL injection; Inject 0.5 mLs into the muscle once. for 1 dose        RTC 6 months  Lupe Lezama MD  Internal Medicine    7/16/2020

## 2020-07-22 ENCOUNTER — OFFICE VISIT (OUTPATIENT)
Dept: OTOLARYNGOLOGY | Facility: CLINIC | Age: 85
End: 2020-07-22
Payer: COMMERCIAL

## 2020-07-22 VITALS
SYSTOLIC BLOOD PRESSURE: 156 MMHG | HEART RATE: 81 BPM | BODY MASS INDEX: 21.17 KG/M2 | WEIGHT: 123.38 LBS | DIASTOLIC BLOOD PRESSURE: 86 MMHG

## 2020-07-22 DIAGNOSIS — H93.13 TINNITUS AURIUM, BILATERAL: ICD-10-CM

## 2020-07-22 DIAGNOSIS — H91.93 BILATERAL HEARING LOSS, UNSPECIFIED HEARING LOSS TYPE: Primary | ICD-10-CM

## 2020-07-22 PROCEDURE — 1126F AMNT PAIN NOTED NONE PRSNT: CPT | Mod: S$GLB,,, | Performed by: OTOLARYNGOLOGY

## 2020-07-22 PROCEDURE — 1101F PT FALLS ASSESS-DOCD LE1/YR: CPT | Mod: CPTII,S$GLB,, | Performed by: OTOLARYNGOLOGY

## 2020-07-22 PROCEDURE — 1101F PR PT FALLS ASSESS DOC 0-1 FALLS W/OUT INJ PAST YR: ICD-10-PCS | Mod: CPTII,S$GLB,, | Performed by: OTOLARYNGOLOGY

## 2020-07-22 PROCEDURE — 99999 PR PBB SHADOW E&M-EST. PATIENT-LVL III: CPT | Mod: PBBFAC,,, | Performed by: OTOLARYNGOLOGY

## 2020-07-22 PROCEDURE — 99213 OFFICE O/P EST LOW 20 MIN: CPT | Mod: S$GLB,,, | Performed by: OTOLARYNGOLOGY

## 2020-07-22 PROCEDURE — 99999 PR PBB SHADOW E&M-EST. PATIENT-LVL III: ICD-10-PCS | Mod: PBBFAC,,, | Performed by: OTOLARYNGOLOGY

## 2020-07-22 PROCEDURE — 1159F PR MEDICATION LIST DOCUMENTED IN MEDICAL RECORD: ICD-10-PCS | Mod: S$GLB,,, | Performed by: OTOLARYNGOLOGY

## 2020-07-22 PROCEDURE — 1126F PR PAIN SEVERITY QUANTIFIED, NO PAIN PRESENT: ICD-10-PCS | Mod: S$GLB,,, | Performed by: OTOLARYNGOLOGY

## 2020-07-22 PROCEDURE — 1159F MED LIST DOCD IN RCRD: CPT | Mod: S$GLB,,, | Performed by: OTOLARYNGOLOGY

## 2020-07-22 PROCEDURE — 99213 PR OFFICE/OUTPT VISIT, EST, LEVL III, 20-29 MIN: ICD-10-PCS | Mod: S$GLB,,, | Performed by: OTOLARYNGOLOGY

## 2020-07-22 NOTE — LETTER
July 22, 2020      Lupe Lezama MD  [address] [facility]  [place] [address]           [facility]hin[facility]  [address]  [facility] [address]  Phone: [phone]  Fax: [phone]          Patient: Brent Alatorre   MR Number: [id number]   YOB: 1932   Date of Visit: 7/22/2020       Dear Dr. Lupe Lezama:    Thank you for referring Brent Alatorre to me for evaluation. Attached you will find relevant portions of my assessment and plan of care.    If you have questions, please do not hesitate to call me. I look forward to following Brent Alatorre along with you.    Sincerely,    Tessa Tillman MD    Enclosure  CC:  No Recipients    If you would like to receive this communication electronically, please contact [email or web address] or [phone] to request more information on [email or web address] Link access.    For providers and/or their staff who would like to refer a patient to [facility], please contact us through our one-stop-shop provider referral line, [facility], at [phone].    If you feel you have received this communication in error or would no longer like to receive these types of communications, please e-mail [email or web address]

## 2020-07-22 NOTE — PROGRESS NOTES
Chief Complaint   Patient presents with    Hearing Loss     bilateral   .     HPI:  Brent Alatorre is a very pleasant 88 y.o. male here to see me today for the first time for evaluation of hearing loss.  He reports hearing loss that has been gradually progressing over the last several years.  He has noted any difference in hearing between the ears, with the left ear being the worse hearing ear.  He has noted tinnitus in both ears.  He has not had any recent issues with ear pain or ear drainage.  He denies a family history of hearing loss, and has not had any previous otologic surgery.  He admits to history of significant loud noise exposure. He is a  and is still playing music. He denies issues with dizziness.        Past Medical History:   Diagnosis Date    Anemia     Bladder cancer     Cancer     bladder    Cataracts, bilateral     Colon polyp     History of kidney problems     Hypertension     Primary osteoarthritis of both hands 11/29/2017     Social History     Socioeconomic History    Marital status:      Spouse name: Magui    Number of children: Not on file    Years of education: Not on file    Highest education level: Not on file   Occupational History    Occupation: Retired   Social Needs    Financial resource strain: Not on file    Food insecurity     Worry: Not on file     Inability: Not on file    Transportation needs     Medical: Not on file     Non-medical: Not on file   Tobacco Use    Smoking status: Never Smoker    Smokeless tobacco: Never Used    Tobacco comment: does marijuana   Substance and Sexual Activity    Alcohol use: Yes     Alcohol/week: 3.0 standard drinks     Types: 3 Cans of beer per week     Comment: very light drinker    Drug use: Yes     Frequency: 4.0 times per week     Types: Marijuana     Comment: Every day    Sexual activity: Not on file   Lifestyle    Physical activity     Days per week: Not on file     Minutes per session: Not  on file    Stress: Not on file   Relationships    Social connections     Talks on phone: Not on file     Gets together: Not on file     Attends Confucianist service: Not on file     Active member of club or organization: Not on file     Attends meetings of clubs or organizations: Not on file     Relationship status: Not on file   Other Topics Concern    Not on file   Social History Narrative    Not on file     Past Surgical History:   Procedure Laterality Date    BLADDER AUGMENTATION  1995    bladder cancer removal and colon resection prostatectomy     BLADDER SURGERY      cystectomy    COLON SURGERY  1995    CYSTOSCOPY      HERNIA REPAIR      PROSTATE SURGERY  1995     Family History   Problem Relation Age of Onset    Diabetes Mother     Cancer Father         prostate cancer    Cancer Brother         stomach cancer     Cancer Brother         lung cancer    Heart disease Neg Hx     Colon polyps Neg Hx     Amblyopia Neg Hx     Blindness Neg Hx     Cataracts Neg Hx     Glaucoma Neg Hx     Macular degeneration Neg Hx     Retinal detachment Neg Hx     Strabismus Neg Hx          Review of Systems  General: negative for chills, fever or weight loss  Psychological: negative for mood changes or depression  Ophthalmic: negative for blurry vision, photophobia or eye pain  ENT: see HPI  Respiratory: no cough, shortness of breath, or wheezing  Cardiovascular: no chest pain or dyspnea on exertion  Gastrointestinal: no abdominal pain, change in bowel habits, or black/ bloody stools  Musculoskeletal: negative for gait disturbance or muscular weakness  Neurological: no syncope or seizures; no ataxia  Dermatological: negative for puritis,  rash and jaundice  Hematologic/lymphatic: no easy bruising, no new lumps or bumps      Physical Exam:    Vitals:    07/22/20 1348   BP: (!) 156/86   Pulse: 81       Constitutional: Well appearing / communicating without difficutly.  NAD.  Eyes: EOM I Bilaterally  Head/Face:  Normocephalic.  Negative paranasal sinus pressure/tenderness.  Salivary glands WNL.  House Brackmann I Bilaterally.    Right Ear: Auricle normal appearance. External Auditory Canal within normal limits no lesions or masses,TM w/o masses/lesions/perforations. TM mobility noted.   Left Ear: Auricle normal appearance. External Auditory Canal within normal limits no lesions or masses,TM w/o masses/lesions/perforations. TM mobility noted.  Rinne Air conduction >bone conduction bilaterally, Mann midline.   Nose: No gross nasal septal deviation. Inferior Turbinates 3+ bilaterally. No septal perforation. No masses/lesions. External nasal skin appears normal without masses/lesions.  Oral Cavity: Gingiva/lips within normal limits.  Dentition/gingiva healthy appearing. Mucus membranes moist. Floor of mouth soft, no masses palpated. Oral Tongue mobile. Hard Palate appears normal.    Oropharynx: Base of tongue appears normal. No masses/lesions noted. Tonsillar fossa/pharyngeal wall without lesions. Posterior oropharynx WNL.  Soft palate without masses. Midline uvula.   Neck/Lymphatic: No LAD I-VI bilaterally.  No thyromegaly.  No masses noted on exam.    Mirror laryngoscopy/nasopharyngoscopy: Active gag reflex.  Unable to perform.    Neuro/Psychiatric: AOx3.  Normal mood and affect.   Cardiovascular: Normal carotid pulses bilaterally, no increasing jugular venous distention noted at cervical region bilaterally.    Respiratory: Normal respiratory effort, no stridor, no retractions noted.    Diagnostic studies reviewed:   Audiogram reviewed personally by myself and in detail with the patient today.                 Assessment:    ICD-10-CM ICD-9-CM    1. Bilateral hearing loss, unspecified hearing loss type  H91.93 389.9    2. Tinnitus aurium, bilateral  H93.13 388.31      The primary encounter diagnosis was Bilateral hearing loss, unspecified hearing loss type. A diagnosis of Tinnitus aurium, bilateral was also pertinent to this  visit.      Plan:  No orders of the defined types were placed in this encounter.     We reviewed the patient's recent audiogram and hearing loss in detail.  We also discussed that he is a good candidate for hearing aids, if and when he the patient is motivated.  He was given handouts with information and pricing of hearing aids, and will contact audiology when ready to proceed.  We also discussed the use hearing protection when exposed to loud noise, including lawn equipment.      We also discussed that tinnitus is most often caused by a hearing loss, and that as the hair cells are damaged, either genetic or as a result of loud noise exposure, they then cause tinnitus.  Some patients find that restricting the salt or caffeine in their diet helps.  Tinnitus tends to be louder in times of stress and fatigue, and may decrease with time.  Sound machines may also be an effective masking technique if needed at night.    Thank you kindly for allowing me to participate in the patient's care.       Tessa Tillman MD

## 2020-07-24 ENCOUNTER — TELEPHONE (OUTPATIENT)
Dept: OTOLARYNGOLOGY | Facility: CLINIC | Age: 85
End: 2020-07-24

## 2020-07-24 NOTE — TELEPHONE ENCOUNTER
----- Message from Yu Daniel sent at 7/24/2020  9:56 AM CDT -----  Contact: Magui Kilgore scheduled an appt for today at 11:20 am. The pt's insurance does cover hearing aids. Any questions please call [phone], thank you.

## 2020-07-24 NOTE — TELEPHONE ENCOUNTER
Spoke with patients wife she was notified appointment for hearing aid consult scheduled incorrectly he would need to see Jolly Dominguez not Dr Alonso. Appointment rescheduled for 7/27 at 10:00 AM for heaing aid consult. She was notified of the date and time

## 2020-07-27 ENCOUNTER — CLINICAL SUPPORT (OUTPATIENT)
Dept: OTOLARYNGOLOGY | Facility: CLINIC | Age: 85
End: 2020-07-27
Payer: COMMERCIAL

## 2020-07-27 DIAGNOSIS — H90.3 SENSORINEURAL HEARING LOSS (SNHL), BILATERAL: Primary | ICD-10-CM

## 2020-07-27 PROCEDURE — 99499 NO LOS: ICD-10-PCS | Mod: S$GLB,,, | Performed by: AUDIOLOGIST-HEARING AID FITTER

## 2020-07-27 PROCEDURE — 99499 UNLISTED E&M SERVICE: CPT | Mod: S$GLB,,, | Performed by: AUDIOLOGIST-HEARING AID FITTER

## 2020-07-27 NOTE — PROGRESS NOTES
Katherine Trent, CCC-A  [facility] [address]  [address]  [place] [address]      Patient: Brent Alatorre   MRN: [id number]    : 1932  MIRZA: 2020    HEARING AID CONSULTATION      Brent Alatorre was seen on the above date for a hearing aid consultation. At today's visit, hearing aid styles and pricing were discussed with the patient. Mr. Alatorre was under the impression that we filed with insurance for hearing aids even though I attempted to make it clear at his last visit that we do not take insurance for hearing aids. He would understandably like to utilize his insurance benefits for hearing aids elsewhere. Mr. Alatorre was appreciative for the information and will follow up annually for his audiogram.         _______________________________  Klaus Trent, CCC-A  Clinical Audiologist

## 2020-08-10 DIAGNOSIS — I10 ESSENTIAL HYPERTENSION: ICD-10-CM

## 2020-08-10 RX ORDER — AMLODIPINE BESYLATE 5 MG/1
5 TABLET ORAL DAILY
Qty: 90 TABLET | Refills: 3 | Status: SHIPPED | OUTPATIENT
Start: 2020-08-10 | End: 2021-07-29

## 2020-08-26 ENCOUNTER — OFFICE VISIT (OUTPATIENT)
Dept: NEUROLOGY | Facility: CLINIC | Age: 85
End: 2020-08-26
Payer: COMMERCIAL

## 2020-08-26 VITALS — WEIGHT: 123 LBS | HEIGHT: 64 IN | BODY MASS INDEX: 21 KG/M2

## 2020-08-26 DIAGNOSIS — R68.89 FORGETFULNESS: ICD-10-CM

## 2020-08-26 PROCEDURE — 1126F PR PAIN SEVERITY QUANTIFIED, NO PAIN PRESENT: ICD-10-PCS | Mod: S$GLB,,, | Performed by: PSYCHIATRY & NEUROLOGY

## 2020-08-26 PROCEDURE — 1101F PT FALLS ASSESS-DOCD LE1/YR: CPT | Mod: CPTII,S$GLB,, | Performed by: PSYCHIATRY & NEUROLOGY

## 2020-08-26 PROCEDURE — 99204 OFFICE O/P NEW MOD 45 MIN: CPT | Mod: S$GLB,,, | Performed by: PSYCHIATRY & NEUROLOGY

## 2020-08-26 PROCEDURE — 99204 PR OFFICE/OUTPT VISIT, NEW, LEVL IV, 45-59 MIN: ICD-10-PCS | Mod: S$GLB,,, | Performed by: PSYCHIATRY & NEUROLOGY

## 2020-08-26 PROCEDURE — 1159F MED LIST DOCD IN RCRD: CPT | Mod: S$GLB,,, | Performed by: PSYCHIATRY & NEUROLOGY

## 2020-08-26 PROCEDURE — 99999 PR PBB SHADOW E&M-EST. PATIENT-LVL IV: ICD-10-PCS | Mod: PBBFAC,,, | Performed by: PSYCHIATRY & NEUROLOGY

## 2020-08-26 PROCEDURE — 1101F PR PT FALLS ASSESS DOC 0-1 FALLS W/OUT INJ PAST YR: ICD-10-PCS | Mod: CPTII,S$GLB,, | Performed by: PSYCHIATRY & NEUROLOGY

## 2020-08-26 PROCEDURE — 1126F AMNT PAIN NOTED NONE PRSNT: CPT | Mod: S$GLB,,, | Performed by: PSYCHIATRY & NEUROLOGY

## 2020-08-26 PROCEDURE — 1159F PR MEDICATION LIST DOCUMENTED IN MEDICAL RECORD: ICD-10-PCS | Mod: S$GLB,,, | Performed by: PSYCHIATRY & NEUROLOGY

## 2020-08-26 PROCEDURE — 99999 PR PBB SHADOW E&M-EST. PATIENT-LVL IV: CPT | Mod: PBBFAC,,, | Performed by: PSYCHIATRY & NEUROLOGY

## 2020-08-26 NOTE — PROGRESS NOTES
[facility] - NEUROLOGY 7TH FL [facility], [place]    Date: August 26, 2020   Patient Name: Brent Alatorre   MRN: [id number]   PCP: Lupe Lezama  Referring Provider: Lupe Lezama MD    Assessment:      This is Brent Alatorre, 88 y.o. 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     Plan:      -  Follow up as needed       I discussed side effects of the medications. I asked the patient to  stop the medication if he notices serious adverse effects as we discussed and to seek immediate medical attention at an ER.     Amarjit Alexander MD  [facility]   Department of Neurology    Subjective:        HPI:   Mr. Brent Alatorre is a 88 y.o. male who presents with a chief complaint of memory, presents alone and provides his own history    Patient reports he does not appreciate significant memory difficulty although his wife feels he has difficulty with short term memory for things she has recently asked him to do.  He also describes instances in which he will enter a room and have a delay in recalling why he entered.  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  Prior to the pandemic he enjoyed going swimming on a regular basis and playing chess with friends.  He continues to drive without difficulty, wife manages finances as she always has.  No issues with sleep or mood.  Does not drink but does have occasional marijuana use.    PAST MEDICAL HISTORY:  Past Medical History:   Diagnosis Date    Anemia     Bladder cancer     Cancer     bladder    Cataracts, bilateral     Colon polyp     History of kidney problems     Hypertension     Primary osteoarthritis of both hands 11/29/2017       PAST SURGICAL HISTORY:  Past Surgical History:   Procedure Laterality Date    BLADDER AUGMENTATION  1995    bladder cancer removal and colon resection  prostatectomy     BLADDER SURGERY      cystectomy    COLON SURGERY  1995    CYSTOSCOPY      HERNIA REPAIR      PROSTATE SURGERY  1995       CURRENT MEDS:  Current Outpatient Medications   Medication Sig Dispense Refill    acetaminophen (TYLENOL) 500 MG tablet Take 500 mg by mouth.      alendronate (FOSAMAX) 70 MG tablet Take 70 mg by mouth every 7 days.      amLODIPine (NORVASC) 5 MG tablet Take 1 tablet (5 mg total) by mouth once daily. 90 tablet 3    ascorbic acid, vitamin C, (VITAMIN C) 100 MG tablet Take 100 mg by mouth once daily.      diclofenac sodium (VOLTAREN) 1 % Gel Apply 2 g topically 3 (three) times daily. (Patient not taking: Reported on 7/22/2020) 200 g 11    ergocalciferol (VITAMIN D2) 50,000 unit Cap Take 50,000 Units by mouth every 7 days.      ferrous sulfate 325 (65 FE) MG EC tablet Take by mouth.      HYDROcodone-acetaminophen (NORCO) 5-325 mg per tablet Take 1 tablet by mouth every 6 (six) hours as needed for Pain. Please start with 1/2 tablet every 6 hours for severe pain ONLY (Patient not taking: Reported on 7/16/2020) 9 tablet 0    lisinopriL (PRINIVIL,ZESTRIL) 5 MG tablet Take 1 tablet by mouth.      multivitamin capsule Take 1 capsule by mouth once daily.      sodium bicarbonate 650 MG tablet Take 1 tablet (650 mg total) by mouth 2 (two) times daily. (Patient not taking: Reported on 7/16/2020) 60 tablet 11    solifenacin (VESICARE) 10 MG tablet Take 1 tablet (10 mg total) by mouth once daily. 90 tablet 3    valACYclovir (VALTREX) 1000 MG tablet Take 1 tablet (1,000 mg total) by mouth 3 (three) times daily. for 7 days 21 tablet 0     No current facility-administered medications for this visit.        ALLERGIES:  Review of patient's allergies indicates:  No Known Allergies    FAMILY HISTORY:  Family History   Problem Relation Age of Onset    Diabetes Mother     Cancer Father         prostate cancer    Cancer Brother         stomach cancer     Cancer Brother         lung  "cancer    Heart disease Neg Hx     Colon polyps Neg Hx     Amblyopia Neg Hx     Blindness Neg Hx     Cataracts Neg Hx     Glaucoma Neg Hx     Macular degeneration Neg Hx     Retinal detachment Neg Hx     Strabismus Neg Hx        SOCIAL HISTORY:  Social History     Tobacco Use    Smoking status: Never Smoker    Smokeless tobacco: Never Used    Tobacco comment: does marijuana   Substance Use Topics    Alcohol use: Yes     Alcohol/week: 3.0 standard drinks     Types: 3 Cans of beer per week     Comment: very light drinker    Drug use: Yes     Frequency: 4.0 times per week     Types: Marijuana     Comment: Every day       Review of Systems:  12 review of systems is negative except for the symptoms mentioned in HPI.        Objective:     Vitals:    08/26/20 1445   Weight: 55.8 kg (123 lb)   Height: 5' 4" (1.626 m)       General: NAD, well nourished   Eyes: no tearing, discharge, no erythema   ENT: moist mucous membranes of the oral cavity, nares patent    Neck: Supple, full range of motion  Cardiovascular: Warm and well perfused, pulses equal and symmetrical  Lungs: Normal work of breathing, normal chest wall excursions  Skin: No rash, lesions, or breakdown on exposed skin  Psychiatry: Mood and affect are appropriate   Abdomen: soft, non tender, non distended  Extremeties: No cyanosis, clubbing or edema.    Neurological   MENTAL STATUS:   CRANIAL NERVES: Visual fields intact. PERRL. EOMI. Facial sensation intact. Face symmetrical. Hearing mildly impaired. Full shoulder shrug bilaterally. Tongue protrudes midline   SENSORY: Sensation is intact to light touch throughout.   Negative Romberg.   MOTOR: Normal bulk and tone. No pronator drift.      REFLEXES: ankles mute, remainder symmetric and 2+ throughout.    CEREBELLAR/COORDINATION/GAIT: Gait steady with normal arm swing and stride length.  Heel to shin intact. Finger to nose intact. Normal rapid alternating movements.       "

## 2020-08-26 NOTE — LETTER
August 26, 2020      Lupe Lezama MD  [address]  [place] [address]           [facility] - Neurology 7th Fl  [address]  [facility] [address]  Phone: [phone]  Fax: [phone]          Patient: Brent Alatorre   MR Number: [id number]   YOB: 1932   Date of Visit: 8/26/2020       Dear Dr. Lupe Lezama:    Thank you for referring Brent Alatorre to me for evaluation. Attached you will find relevant portions of my assessment and plan of care.    If you have questions, please do not hesitate to call me. I look forward to following Brent Alatorre along with you.    Sincerely,    Amarjit Alexander MD    Enclosure  CC:  No Recipients    If you would like to receive this communication electronically, please contact [email or web address] or [phone] to request more information on [facility] Link access.    For providers and/or their staff who would like to refer a patient to [facility], please contact us through our one-stop-shop provider referral line, [facility], at [phone].    If you feel you have received this communication in error or would no longer like to receive these types of communications, please e-mail [email or web address]

## 2020-09-29 ENCOUNTER — PATIENT MESSAGE (OUTPATIENT)
Dept: OTHER | Facility: OTHER | Age: 85
End: 2020-09-29

## 2020-12-11 ENCOUNTER — PATIENT MESSAGE (OUTPATIENT)
Dept: OTHER | Facility: OTHER | Age: 85
End: 2020-12-11

## 2020-12-29 ENCOUNTER — TELEPHONE (OUTPATIENT)
Dept: INTERNAL MEDICINE | Facility: CLINIC | Age: 85
End: 2020-12-29

## 2020-12-29 NOTE — TELEPHONE ENCOUNTER
----- Message from Nita Franco sent at 12/29/2020  1:34 PM CST -----  Contact: [phone]  Patient is requesting a call back in regards to the Covid Vaccine. [facility] 4 to take the vaccine but he would like to discuss with the doctor before taking it. He just have a few questions. Please call and advise.

## 2020-12-29 NOTE — TELEPHONE ENCOUNTER
I spoke to Ms Kilgore, the patient's wife, and answered all questions to the best of my ability. Vaccine was recommended to take to the patient.

## 2021-01-19 ENCOUNTER — OFFICE VISIT (OUTPATIENT)
Dept: INTERNAL MEDICINE | Facility: CLINIC | Age: 86
End: 2021-01-19
Payer: COMMERCIAL

## 2021-01-19 VITALS
DIASTOLIC BLOOD PRESSURE: 68 MMHG | TEMPERATURE: 98 F | BODY MASS INDEX: 21.68 KG/M2 | OXYGEN SATURATION: 97 % | RESPIRATION RATE: 18 BRPM | HEIGHT: 63 IN | SYSTOLIC BLOOD PRESSURE: 160 MMHG | HEART RATE: 67 BPM | WEIGHT: 122.38 LBS

## 2021-01-19 DIAGNOSIS — K59.00 CONSTIPATION, UNSPECIFIED CONSTIPATION TYPE: ICD-10-CM

## 2021-01-19 DIAGNOSIS — M81.0 OSTEOPOROSIS, UNSPECIFIED OSTEOPOROSIS TYPE, UNSPECIFIED PATHOLOGICAL FRACTURE PRESENCE: ICD-10-CM

## 2021-01-19 DIAGNOSIS — B35.1 NAIL FUNGUS: ICD-10-CM

## 2021-01-19 DIAGNOSIS — K64.9 HEMORRHOIDS, UNSPECIFIED HEMORRHOID TYPE: ICD-10-CM

## 2021-01-19 DIAGNOSIS — I10 ESSENTIAL HYPERTENSION: Primary | ICD-10-CM

## 2021-01-19 DIAGNOSIS — N18.31 STAGE 3A CHRONIC KIDNEY DISEASE: ICD-10-CM

## 2021-01-19 PROCEDURE — 1101F PT FALLS ASSESS-DOCD LE1/YR: CPT | Mod: CPTII,S$GLB,, | Performed by: HOSPITALIST

## 2021-01-19 PROCEDURE — 99999 PR PBB SHADOW E&M-EST. PATIENT-LVL V: CPT | Mod: PBBFAC,,, | Performed by: HOSPITALIST

## 2021-01-19 PROCEDURE — 1126F AMNT PAIN NOTED NONE PRSNT: CPT | Mod: S$GLB,,, | Performed by: HOSPITALIST

## 2021-01-19 PROCEDURE — 3288F PR FALLS RISK ASSESSMENT DOCUMENTED: ICD-10-PCS | Mod: CPTII,S$GLB,, | Performed by: HOSPITALIST

## 2021-01-19 PROCEDURE — 1126F PR PAIN SEVERITY QUANTIFIED, NO PAIN PRESENT: ICD-10-PCS | Mod: S$GLB,,, | Performed by: HOSPITALIST

## 2021-01-19 PROCEDURE — 99214 PR OFFICE/OUTPT VISIT, EST, LEVL IV, 30-39 MIN: ICD-10-PCS | Mod: S$GLB,,, | Performed by: HOSPITALIST

## 2021-01-19 PROCEDURE — 1101F PR PT FALLS ASSESS DOC 0-1 FALLS W/OUT INJ PAST YR: ICD-10-PCS | Mod: CPTII,S$GLB,, | Performed by: HOSPITALIST

## 2021-01-19 PROCEDURE — 1157F PR ADVANCE CARE PLAN OR EQUIV PRESENT IN MEDICAL RECORD: ICD-10-PCS | Mod: S$GLB,,, | Performed by: HOSPITALIST

## 2021-01-19 PROCEDURE — 1159F MED LIST DOCD IN RCRD: CPT | Mod: S$GLB,,, | Performed by: HOSPITALIST

## 2021-01-19 PROCEDURE — 99214 OFFICE O/P EST MOD 30 MIN: CPT | Mod: S$GLB,,, | Performed by: HOSPITALIST

## 2021-01-19 PROCEDURE — 3288F FALL RISK ASSESSMENT DOCD: CPT | Mod: CPTII,S$GLB,, | Performed by: HOSPITALIST

## 2021-01-19 PROCEDURE — 1157F ADVNC CARE PLAN IN RCRD: CPT | Mod: S$GLB,,, | Performed by: HOSPITALIST

## 2021-01-19 PROCEDURE — 1159F PR MEDICATION LIST DOCUMENTED IN MEDICAL RECORD: ICD-10-PCS | Mod: S$GLB,,, | Performed by: HOSPITALIST

## 2021-01-19 PROCEDURE — 99999 PR PBB SHADOW E&M-EST. PATIENT-LVL V: ICD-10-PCS | Mod: PBBFAC,,, | Performed by: HOSPITALIST

## 2021-01-20 ENCOUNTER — TELEPHONE (OUTPATIENT)
Dept: PODIATRY | Facility: CLINIC | Age: 86
End: 2021-01-20

## 2021-01-21 ENCOUNTER — OFFICE VISIT (OUTPATIENT)
Dept: PODIATRY | Facility: CLINIC | Age: 86
End: 2021-01-21
Payer: COMMERCIAL

## 2021-01-21 ENCOUNTER — TELEPHONE (OUTPATIENT)
Dept: PODIATRY | Facility: CLINIC | Age: 86
End: 2021-01-21

## 2021-01-21 VITALS
DIASTOLIC BLOOD PRESSURE: 72 MMHG | BODY MASS INDEX: 21.67 KG/M2 | HEIGHT: 63 IN | SYSTOLIC BLOOD PRESSURE: 121 MMHG | HEART RATE: 69 BPM

## 2021-01-21 DIAGNOSIS — B35.1 NAIL FUNGUS: ICD-10-CM

## 2021-01-21 DIAGNOSIS — B35.1 ONYCHOMYCOSIS DUE TO DERMATOPHYTE: Primary | ICD-10-CM

## 2021-01-21 PROCEDURE — 1157F PR ADVANCE CARE PLAN OR EQUIV PRESENT IN MEDICAL RECORD: ICD-10-PCS | Mod: S$GLB,,, | Performed by: PODIATRIST

## 2021-01-21 PROCEDURE — 1159F PR MEDICATION LIST DOCUMENTED IN MEDICAL RECORD: ICD-10-PCS | Mod: S$GLB,,, | Performed by: PODIATRIST

## 2021-01-21 PROCEDURE — 1157F ADVNC CARE PLAN IN RCRD: CPT | Mod: S$GLB,,, | Performed by: PODIATRIST

## 2021-01-21 PROCEDURE — 99213 PR OFFICE/OUTPT VISIT, EST, LEVL III, 20-29 MIN: ICD-10-PCS | Mod: S$GLB,,, | Performed by: PODIATRIST

## 2021-01-21 PROCEDURE — 1159F MED LIST DOCD IN RCRD: CPT | Mod: S$GLB,,, | Performed by: PODIATRIST

## 2021-01-21 PROCEDURE — 1126F AMNT PAIN NOTED NONE PRSNT: CPT | Mod: S$GLB,,, | Performed by: PODIATRIST

## 2021-01-21 PROCEDURE — 99999 PR PBB SHADOW E&M-EST. PATIENT-LVL III: ICD-10-PCS | Mod: PBBFAC,,, | Performed by: PODIATRIST

## 2021-01-21 PROCEDURE — 99213 OFFICE O/P EST LOW 20 MIN: CPT | Mod: S$GLB,,, | Performed by: PODIATRIST

## 2021-01-21 PROCEDURE — 99999 PR PBB SHADOW E&M-EST. PATIENT-LVL III: CPT | Mod: PBBFAC,,, | Performed by: PODIATRIST

## 2021-01-21 PROCEDURE — 1126F PR PAIN SEVERITY QUANTIFIED, NO PAIN PRESENT: ICD-10-PCS | Mod: S$GLB,,, | Performed by: PODIATRIST

## 2021-01-21 RX ORDER — CICLOPIROX 80 MG/ML
SOLUTION TOPICAL NIGHTLY
Qty: 1 BOTTLE | Refills: 0 | Status: SHIPPED | OUTPATIENT
Start: 2021-01-21 | End: 2021-01-21 | Stop reason: CLARIF

## 2021-01-21 RX ORDER — CICLOPIROX 80 MG/ML
SOLUTION TOPICAL NIGHTLY
Qty: 6.6 ML | Refills: 0 | Status: SHIPPED | OUTPATIENT
Start: 2021-01-21 | End: 2021-03-08

## 2021-02-01 ENCOUNTER — TELEPHONE (OUTPATIENT)
Dept: PHARMACY | Facility: CLINIC | Age: 86
End: 2021-02-01

## 2021-03-05 ENCOUNTER — TELEPHONE (OUTPATIENT)
Dept: PODIATRY | Facility: CLINIC | Age: 86
End: 2021-03-05

## 2021-03-08 ENCOUNTER — TELEPHONE (OUTPATIENT)
Dept: PODIATRY | Facility: CLINIC | Age: 86
End: 2021-03-08

## 2021-03-08 DIAGNOSIS — B35.1 ONYCHOMYCOSIS DUE TO DERMATOPHYTE: Primary | ICD-10-CM

## 2021-03-08 RX ORDER — CICLOPIROX 80 MG/ML
SOLUTION TOPICAL NIGHTLY
Qty: 1 BOTTLE | Refills: 0 | Status: SHIPPED | OUTPATIENT
Start: 2021-03-08 | End: 2024-02-23

## 2021-03-15 ENCOUNTER — OFFICE VISIT (OUTPATIENT)
Dept: OPHTHALMOLOGY | Facility: CLINIC | Age: 86
End: 2021-03-15
Payer: COMMERCIAL

## 2021-03-15 DIAGNOSIS — H25.13 NUCLEAR SCLEROSIS OF BOTH EYES: ICD-10-CM

## 2021-03-15 DIAGNOSIS — H11.32 SUBCONJUNCTIVAL HEMORRHAGE, NON-TRAUMATIC, LEFT: Primary | ICD-10-CM

## 2021-03-15 PROCEDURE — 1101F PT FALLS ASSESS-DOCD LE1/YR: CPT | Mod: CPTII,S$GLB,, | Performed by: OPHTHALMOLOGY

## 2021-03-15 PROCEDURE — 99999 PR PBB SHADOW E&M-EST. PATIENT-LVL III: CPT | Mod: PBBFAC,,, | Performed by: OPHTHALMOLOGY

## 2021-03-15 PROCEDURE — 92002 INTRM OPH EXAM NEW PATIENT: CPT | Mod: S$GLB,,, | Performed by: OPHTHALMOLOGY

## 2021-03-15 PROCEDURE — 1126F PR PAIN SEVERITY QUANTIFIED, NO PAIN PRESENT: ICD-10-PCS | Mod: S$GLB,,, | Performed by: OPHTHALMOLOGY

## 2021-03-15 PROCEDURE — 1157F ADVNC CARE PLAN IN RCRD: CPT | Mod: S$GLB,,, | Performed by: OPHTHALMOLOGY

## 2021-03-15 PROCEDURE — 1157F PR ADVANCE CARE PLAN OR EQUIV PRESENT IN MEDICAL RECORD: ICD-10-PCS | Mod: S$GLB,,, | Performed by: OPHTHALMOLOGY

## 2021-03-15 PROCEDURE — 3288F FALL RISK ASSESSMENT DOCD: CPT | Mod: CPTII,S$GLB,, | Performed by: OPHTHALMOLOGY

## 2021-03-15 PROCEDURE — 1126F AMNT PAIN NOTED NONE PRSNT: CPT | Mod: S$GLB,,, | Performed by: OPHTHALMOLOGY

## 2021-03-15 PROCEDURE — 1101F PR PT FALLS ASSESS DOC 0-1 FALLS W/OUT INJ PAST YR: ICD-10-PCS | Mod: CPTII,S$GLB,, | Performed by: OPHTHALMOLOGY

## 2021-03-15 PROCEDURE — 99999 PR PBB SHADOW E&M-EST. PATIENT-LVL III: ICD-10-PCS | Mod: PBBFAC,,, | Performed by: OPHTHALMOLOGY

## 2021-03-15 PROCEDURE — 92002 PR EYE EXAM, NEW PATIENT,INTERMED: ICD-10-PCS | Mod: S$GLB,,, | Performed by: OPHTHALMOLOGY

## 2021-03-15 PROCEDURE — 3288F PR FALLS RISK ASSESSMENT DOCUMENTED: ICD-10-PCS | Mod: CPTII,S$GLB,, | Performed by: OPHTHALMOLOGY

## 2021-04-16 ENCOUNTER — PATIENT MESSAGE (OUTPATIENT)
Dept: RESEARCH | Facility: HOSPITAL | Age: 86
End: 2021-04-16

## 2021-04-22 ENCOUNTER — OFFICE VISIT (OUTPATIENT)
Dept: PODIATRY | Facility: CLINIC | Age: 86
End: 2021-04-22
Payer: COMMERCIAL

## 2021-04-22 VITALS
DIASTOLIC BLOOD PRESSURE: 86 MMHG | HEIGHT: 63 IN | SYSTOLIC BLOOD PRESSURE: 152 MMHG | BODY MASS INDEX: 21.67 KG/M2 | HEART RATE: 86 BPM

## 2021-04-22 DIAGNOSIS — B35.1 ONYCHOMYCOSIS DUE TO DERMATOPHYTE: Primary | ICD-10-CM

## 2021-04-22 PROCEDURE — 1126F PR PAIN SEVERITY QUANTIFIED, NO PAIN PRESENT: ICD-10-PCS | Mod: S$GLB,,, | Performed by: PODIATRIST

## 2021-04-22 PROCEDURE — 1126F AMNT PAIN NOTED NONE PRSNT: CPT | Mod: S$GLB,,, | Performed by: PODIATRIST

## 2021-04-22 PROCEDURE — 1159F PR MEDICATION LIST DOCUMENTED IN MEDICAL RECORD: ICD-10-PCS | Mod: S$GLB,,, | Performed by: PODIATRIST

## 2021-04-22 PROCEDURE — 1157F ADVNC CARE PLAN IN RCRD: CPT | Mod: S$GLB,,, | Performed by: PODIATRIST

## 2021-04-22 PROCEDURE — 1157F PR ADVANCE CARE PLAN OR EQUIV PRESENT IN MEDICAL RECORD: ICD-10-PCS | Mod: S$GLB,,, | Performed by: PODIATRIST

## 2021-04-22 PROCEDURE — 99999 PR PBB SHADOW E&M-EST. PATIENT-LVL III: CPT | Mod: PBBFAC,,, | Performed by: PODIATRIST

## 2021-04-22 PROCEDURE — 99212 PR OFFICE/OUTPT VISIT, EST, LEVL II, 10-19 MIN: ICD-10-PCS | Mod: S$GLB,,, | Performed by: PODIATRIST

## 2021-04-22 PROCEDURE — 99212 OFFICE O/P EST SF 10 MIN: CPT | Mod: S$GLB,,, | Performed by: PODIATRIST

## 2021-04-22 PROCEDURE — 99999 PR PBB SHADOW E&M-EST. PATIENT-LVL III: ICD-10-PCS | Mod: PBBFAC,,, | Performed by: PODIATRIST

## 2021-04-22 PROCEDURE — 1159F MED LIST DOCD IN RCRD: CPT | Mod: S$GLB,,, | Performed by: PODIATRIST

## 2021-05-01 ENCOUNTER — OFFICE VISIT (OUTPATIENT)
Dept: INTERNAL MEDICINE | Facility: CLINIC | Age: 86
End: 2021-05-01
Payer: COMMERCIAL

## 2021-05-01 VITALS
HEART RATE: 64 BPM | SYSTOLIC BLOOD PRESSURE: 122 MMHG | TEMPERATURE: 98 F | DIASTOLIC BLOOD PRESSURE: 60 MMHG | BODY MASS INDEX: 21.71 KG/M2 | RESPIRATION RATE: 14 BRPM | HEIGHT: 63 IN | WEIGHT: 122.56 LBS

## 2021-05-01 DIAGNOSIS — R39.198 DIFFICULTY URINATING: ICD-10-CM

## 2021-05-01 DIAGNOSIS — R22.31 FINGER MASS, RIGHT: ICD-10-CM

## 2021-05-01 DIAGNOSIS — R10.12 LEFT UPPER QUADRANT ABDOMINAL PAIN: Primary | ICD-10-CM

## 2021-05-01 PROCEDURE — 99999 PR PBB SHADOW E&M-EST. PATIENT-LVL IV: ICD-10-PCS | Mod: PBBFAC,,, | Performed by: INTERNAL MEDICINE

## 2021-05-01 PROCEDURE — 1159F MED LIST DOCD IN RCRD: CPT | Mod: S$GLB,,, | Performed by: INTERNAL MEDICINE

## 2021-05-01 PROCEDURE — 99214 OFFICE O/P EST MOD 30 MIN: CPT | Mod: S$GLB,,, | Performed by: INTERNAL MEDICINE

## 2021-05-01 PROCEDURE — 1126F PR PAIN SEVERITY QUANTIFIED, NO PAIN PRESENT: ICD-10-PCS | Mod: S$GLB,,, | Performed by: INTERNAL MEDICINE

## 2021-05-01 PROCEDURE — 1157F PR ADVANCE CARE PLAN OR EQUIV PRESENT IN MEDICAL RECORD: ICD-10-PCS | Mod: S$GLB,,, | Performed by: INTERNAL MEDICINE

## 2021-05-01 PROCEDURE — 99999 PR PBB SHADOW E&M-EST. PATIENT-LVL IV: CPT | Mod: PBBFAC,,, | Performed by: INTERNAL MEDICINE

## 2021-05-01 PROCEDURE — 99214 PR OFFICE/OUTPT VISIT, EST, LEVL IV, 30-39 MIN: ICD-10-PCS | Mod: S$GLB,,, | Performed by: INTERNAL MEDICINE

## 2021-05-01 PROCEDURE — 1157F ADVNC CARE PLAN IN RCRD: CPT | Mod: S$GLB,,, | Performed by: INTERNAL MEDICINE

## 2021-05-01 PROCEDURE — 1159F PR MEDICATION LIST DOCUMENTED IN MEDICAL RECORD: ICD-10-PCS | Mod: S$GLB,,, | Performed by: INTERNAL MEDICINE

## 2021-05-01 PROCEDURE — 1126F AMNT PAIN NOTED NONE PRSNT: CPT | Mod: S$GLB,,, | Performed by: INTERNAL MEDICINE

## 2021-05-01 RX ORDER — LISINOPRIL 5 MG/1
5 TABLET ORAL DAILY
Qty: 90 TABLET | Refills: 0 | Status: SHIPPED | OUTPATIENT
Start: 2021-05-01 | End: 2021-07-30

## 2021-05-01 RX ORDER — SOLIFENACIN SUCCINATE 10 MG/1
10 TABLET, FILM COATED ORAL DAILY
Qty: 90 TABLET | Refills: 0 | Status: SHIPPED | OUTPATIENT
Start: 2021-05-01 | End: 2021-07-30

## 2021-05-03 ENCOUNTER — HOSPITAL ENCOUNTER (OUTPATIENT)
Dept: RADIOLOGY | Facility: HOSPITAL | Age: 86
Discharge: HOME | End: 2021-05-03
Attending: INTERNAL MEDICINE
Payer: COMMERCIAL

## 2021-05-03 DIAGNOSIS — R22.31 FINGER MASS, RIGHT: ICD-10-CM

## 2021-05-03 DIAGNOSIS — R10.12 LEFT UPPER QUADRANT ABDOMINAL PAIN: ICD-10-CM

## 2021-05-03 PROCEDURE — 72070 X-RAY EXAM THORAC SPINE 2VWS: CPT | Mod: TC

## 2021-05-03 PROCEDURE — 72070 XR THORACIC SPINE AP LATERAL: ICD-10-PCS | Mod: 26,,, | Performed by: RADIOLOGY

## 2021-05-03 PROCEDURE — 72070 X-RAY EXAM THORAC SPINE 2VWS: CPT | Mod: 26,,, | Performed by: RADIOLOGY

## 2021-05-03 PROCEDURE — 73130 XR HAND COMPLETE 3 VIEW RIGHT: ICD-10-PCS | Mod: 26,RT,, | Performed by: RADIOLOGY

## 2021-05-03 PROCEDURE — 73130 X-RAY EXAM OF HAND: CPT | Mod: TC,RT

## 2021-05-03 PROCEDURE — 73130 X-RAY EXAM OF HAND: CPT | Mod: 26,RT,, | Performed by: RADIOLOGY

## 2021-05-10 ENCOUNTER — HOSPITAL ENCOUNTER (OUTPATIENT)
Dept: RADIOLOGY | Facility: HOSPITAL | Age: 86
Discharge: HOME | End: 2021-05-10
Attending: INTERNAL MEDICINE
Payer: COMMERCIAL

## 2021-05-10 DIAGNOSIS — R10.12 LEFT UPPER QUADRANT ABDOMINAL PAIN: ICD-10-CM

## 2021-05-10 PROCEDURE — 76700 US ABDOMEN COMPLETE: ICD-10-PCS | Mod: 26,,, | Performed by: RADIOLOGY

## 2021-05-10 PROCEDURE — 76700 US EXAM ABDOM COMPLETE: CPT | Mod: TC

## 2021-05-10 PROCEDURE — 76700 US EXAM ABDOM COMPLETE: CPT | Mod: 26,,, | Performed by: RADIOLOGY

## 2021-07-30 DIAGNOSIS — R39.198 DIFFICULTY URINATING: ICD-10-CM

## 2021-07-30 RX ORDER — LISINOPRIL 5 MG/1
TABLET ORAL
Qty: 90 TABLET | Refills: 0 | Status: SHIPPED | OUTPATIENT
Start: 2021-07-30 | End: 2021-11-08

## 2021-07-30 RX ORDER — SOLIFENACIN SUCCINATE 10 MG/1
TABLET, FILM COATED ORAL
Qty: 90 TABLET | Refills: 0 | Status: SHIPPED | OUTPATIENT
Start: 2021-07-30 | End: 2021-11-02

## 2021-08-18 ENCOUNTER — TELEPHONE (OUTPATIENT)
Dept: INTERNAL MEDICINE | Facility: CLINIC | Age: 86
End: 2021-08-18

## 2021-08-19 ENCOUNTER — OFFICE VISIT (OUTPATIENT)
Dept: INTERNAL MEDICINE | Facility: CLINIC | Age: 86
End: 2021-08-19
Payer: COMMERCIAL

## 2021-08-19 VITALS
WEIGHT: 118.63 LBS | DIASTOLIC BLOOD PRESSURE: 56 MMHG | RESPIRATION RATE: 17 BRPM | HEART RATE: 76 BPM | SYSTOLIC BLOOD PRESSURE: 102 MMHG | HEIGHT: 63 IN | BODY MASS INDEX: 21.02 KG/M2 | OXYGEN SATURATION: 96 % | TEMPERATURE: 98 F

## 2021-08-19 DIAGNOSIS — R41.3 MEMORY DEFICIT: ICD-10-CM

## 2021-08-19 DIAGNOSIS — I10 ESSENTIAL HYPERTENSION: Primary | Chronic | ICD-10-CM

## 2021-08-19 DIAGNOSIS — F03.90 DEMENTIA WITHOUT BEHAVIORAL DISTURBANCE, UNSPECIFIED DEMENTIA TYPE: ICD-10-CM

## 2021-08-19 PROCEDURE — 1157F PR ADVANCE CARE PLAN OR EQUIV PRESENT IN MEDICAL RECORD: ICD-10-PCS | Mod: CPTII,S$GLB,, | Performed by: INTERNAL MEDICINE

## 2021-08-19 PROCEDURE — 99999 PR PBB SHADOW E&M-EST. PATIENT-LVL IV: ICD-10-PCS | Mod: PBBFAC,,, | Performed by: INTERNAL MEDICINE

## 2021-08-19 PROCEDURE — 1157F ADVNC CARE PLAN IN RCRD: CPT | Mod: CPTII,S$GLB,, | Performed by: INTERNAL MEDICINE

## 2021-08-19 PROCEDURE — 1159F MED LIST DOCD IN RCRD: CPT | Mod: CPTII,S$GLB,, | Performed by: INTERNAL MEDICINE

## 2021-08-19 PROCEDURE — 99214 PR OFFICE/OUTPT VISIT, EST, LEVL IV, 30-39 MIN: ICD-10-PCS | Mod: S$GLB,,, | Performed by: INTERNAL MEDICINE

## 2021-08-19 PROCEDURE — 1126F AMNT PAIN NOTED NONE PRSNT: CPT | Mod: CPTII,S$GLB,, | Performed by: INTERNAL MEDICINE

## 2021-08-19 PROCEDURE — 1101F PR PT FALLS ASSESS DOC 0-1 FALLS W/OUT INJ PAST YR: ICD-10-PCS | Mod: CPTII,S$GLB,, | Performed by: INTERNAL MEDICINE

## 2021-08-19 PROCEDURE — 3288F FALL RISK ASSESSMENT DOCD: CPT | Mod: CPTII,S$GLB,, | Performed by: INTERNAL MEDICINE

## 2021-08-19 PROCEDURE — 1126F PR PAIN SEVERITY QUANTIFIED, NO PAIN PRESENT: ICD-10-PCS | Mod: CPTII,S$GLB,, | Performed by: INTERNAL MEDICINE

## 2021-08-19 PROCEDURE — 99999 PR PBB SHADOW E&M-EST. PATIENT-LVL IV: CPT | Mod: PBBFAC,,, | Performed by: INTERNAL MEDICINE

## 2021-08-19 PROCEDURE — 99214 OFFICE O/P EST MOD 30 MIN: CPT | Mod: S$GLB,,, | Performed by: INTERNAL MEDICINE

## 2021-08-19 PROCEDURE — 1160F PR REVIEW ALL MEDS BY PRESCRIBER/CLIN PHARMACIST DOCUMENTED: ICD-10-PCS | Mod: CPTII,S$GLB,, | Performed by: INTERNAL MEDICINE

## 2021-08-19 PROCEDURE — 1159F PR MEDICATION LIST DOCUMENTED IN MEDICAL RECORD: ICD-10-PCS | Mod: CPTII,S$GLB,, | Performed by: INTERNAL MEDICINE

## 2021-08-19 PROCEDURE — 1101F PT FALLS ASSESS-DOCD LE1/YR: CPT | Mod: CPTII,S$GLB,, | Performed by: INTERNAL MEDICINE

## 2021-08-19 PROCEDURE — 1160F RVW MEDS BY RX/DR IN RCRD: CPT | Mod: CPTII,S$GLB,, | Performed by: INTERNAL MEDICINE

## 2021-08-19 PROCEDURE — 3288F PR FALLS RISK ASSESSMENT DOCUMENTED: ICD-10-PCS | Mod: CPTII,S$GLB,, | Performed by: INTERNAL MEDICINE

## 2021-08-19 RX ORDER — AMLODIPINE BESYLATE 2.5 MG/1
2.5 TABLET ORAL DAILY
Qty: 90 TABLET | Refills: 3 | Status: SHIPPED | OUTPATIENT
Start: 2021-08-19 | End: 2022-08-02 | Stop reason: SDUPTHER

## 2021-08-19 RX ORDER — DONEPEZIL HYDROCHLORIDE 5 MG/1
5 TABLET, FILM COATED ORAL NIGHTLY
Qty: 30 TABLET | Refills: 11 | Status: SHIPPED | OUTPATIENT
Start: 2021-08-19 | End: 2021-12-16 | Stop reason: ALTCHOICE

## 2021-08-20 ENCOUNTER — CLINICAL SUPPORT (OUTPATIENT)
Dept: INTERNAL MEDICINE | Facility: CLINIC | Age: 86
End: 2021-08-20
Payer: COMMERCIAL

## 2021-08-23 ENCOUNTER — OFFICE VISIT (OUTPATIENT)
Dept: NEUROLOGY | Facility: CLINIC | Age: 86
End: 2021-08-23
Payer: COMMERCIAL

## 2021-08-23 VITALS
HEIGHT: 63 IN | DIASTOLIC BLOOD PRESSURE: 66 MMHG | WEIGHT: 118 LBS | HEART RATE: 73 BPM | BODY MASS INDEX: 20.91 KG/M2 | SYSTOLIC BLOOD PRESSURE: 117 MMHG

## 2021-08-23 DIAGNOSIS — F03.90 DEMENTIA WITHOUT BEHAVIORAL DISTURBANCE, UNSPECIFIED DEMENTIA TYPE: ICD-10-CM

## 2021-08-23 DIAGNOSIS — R41.3 MEMORY DEFICIT: ICD-10-CM

## 2021-08-23 PROCEDURE — 1126F PR PAIN SEVERITY QUANTIFIED, NO PAIN PRESENT: ICD-10-PCS | Mod: CPTII,S$GLB,, | Performed by: PSYCHIATRY & NEUROLOGY

## 2021-08-23 PROCEDURE — 99214 OFFICE O/P EST MOD 30 MIN: CPT | Mod: S$GLB,,, | Performed by: PSYCHIATRY & NEUROLOGY

## 2021-08-23 PROCEDURE — 1159F MED LIST DOCD IN RCRD: CPT | Mod: CPTII,S$GLB,, | Performed by: PSYCHIATRY & NEUROLOGY

## 2021-08-23 PROCEDURE — 1101F PR PT FALLS ASSESS DOC 0-1 FALLS W/OUT INJ PAST YR: ICD-10-PCS | Mod: CPTII,S$GLB,, | Performed by: PSYCHIATRY & NEUROLOGY

## 2021-08-23 PROCEDURE — 1101F PT FALLS ASSESS-DOCD LE1/YR: CPT | Mod: CPTII,S$GLB,, | Performed by: PSYCHIATRY & NEUROLOGY

## 2021-08-23 PROCEDURE — 1159F PR MEDICATION LIST DOCUMENTED IN MEDICAL RECORD: ICD-10-PCS | Mod: CPTII,S$GLB,, | Performed by: PSYCHIATRY & NEUROLOGY

## 2021-08-23 PROCEDURE — 3288F FALL RISK ASSESSMENT DOCD: CPT | Mod: CPTII,S$GLB,, | Performed by: PSYCHIATRY & NEUROLOGY

## 2021-08-23 PROCEDURE — 1160F PR REVIEW ALL MEDS BY PRESCRIBER/CLIN PHARMACIST DOCUMENTED: ICD-10-PCS | Mod: CPTII,S$GLB,, | Performed by: PSYCHIATRY & NEUROLOGY

## 2021-08-23 PROCEDURE — 1160F RVW MEDS BY RX/DR IN RCRD: CPT | Mod: CPTII,S$GLB,, | Performed by: PSYCHIATRY & NEUROLOGY

## 2021-08-23 PROCEDURE — 1157F PR ADVANCE CARE PLAN OR EQUIV PRESENT IN MEDICAL RECORD: ICD-10-PCS | Mod: CPTII,S$GLB,, | Performed by: PSYCHIATRY & NEUROLOGY

## 2021-08-23 PROCEDURE — 1157F ADVNC CARE PLAN IN RCRD: CPT | Mod: CPTII,S$GLB,, | Performed by: PSYCHIATRY & NEUROLOGY

## 2021-08-23 PROCEDURE — 3288F PR FALLS RISK ASSESSMENT DOCUMENTED: ICD-10-PCS | Mod: CPTII,S$GLB,, | Performed by: PSYCHIATRY & NEUROLOGY

## 2021-08-23 PROCEDURE — 99999 PR PBB SHADOW E&M-EST. PATIENT-LVL V: CPT | Mod: PBBFAC,,, | Performed by: PSYCHIATRY & NEUROLOGY

## 2021-08-23 PROCEDURE — 99214 PR OFFICE/OUTPT VISIT, EST, LEVL IV, 30-39 MIN: ICD-10-PCS | Mod: S$GLB,,, | Performed by: PSYCHIATRY & NEUROLOGY

## 2021-08-23 PROCEDURE — 99999 PR PBB SHADOW E&M-EST. PATIENT-LVL V: ICD-10-PCS | Mod: PBBFAC,,, | Performed by: PSYCHIATRY & NEUROLOGY

## 2021-08-23 PROCEDURE — 1126F AMNT PAIN NOTED NONE PRSNT: CPT | Mod: CPTII,S$GLB,, | Performed by: PSYCHIATRY & NEUROLOGY

## 2021-08-24 ENCOUNTER — HOSPITAL ENCOUNTER (OUTPATIENT)
Dept: RADIOLOGY | Facility: OTHER | Age: 86
Discharge: HOME | End: 2021-08-24
Attending: PSYCHIATRY & NEUROLOGY
Payer: COMMERCIAL

## 2021-08-24 DIAGNOSIS — R41.3 MEMORY DEFICIT: ICD-10-CM

## 2021-08-24 PROCEDURE — 70551 MRI BRAIN STEM W/O DYE: CPT | Mod: 26,,, | Performed by: RADIOLOGY

## 2021-08-24 PROCEDURE — 70551 MRI BRAIN WITHOUT CONTRAST: ICD-10-PCS | Mod: 26,,, | Performed by: RADIOLOGY

## 2021-08-24 PROCEDURE — 70551 MRI BRAIN STEM W/O DYE: CPT | Mod: TC

## 2021-08-26 ENCOUNTER — PATIENT MESSAGE (OUTPATIENT)
Dept: NEUROLOGY | Facility: CLINIC | Age: 86
End: 2021-08-26

## 2021-09-03 ENCOUNTER — PATIENT MESSAGE (OUTPATIENT)
Dept: NEUROLOGY | Facility: CLINIC | Age: 86
End: 2021-09-03

## 2021-09-10 PROBLEM — F03.90 DEMENTIA WITHOUT BEHAVIORAL DISTURBANCE: Chronic | Status: ACTIVE | Noted: 2021-08-19

## 2021-11-02 DIAGNOSIS — R39.198 DIFFICULTY URINATING: ICD-10-CM

## 2021-11-02 RX ORDER — SOLIFENACIN SUCCINATE 10 MG/1
TABLET, FILM COATED ORAL
Qty: 90 TABLET | Refills: 3 | Status: SHIPPED | OUTPATIENT
Start: 2021-11-02 | End: 2022-10-28

## 2021-11-08 DIAGNOSIS — I10 ESSENTIAL HYPERTENSION: Primary | ICD-10-CM

## 2021-11-08 DIAGNOSIS — N18.30 STAGE 3 CHRONIC KIDNEY DISEASE, UNSPECIFIED WHETHER STAGE 3A OR 3B CKD: ICD-10-CM

## 2021-11-08 DIAGNOSIS — E78.5 DYSLIPIDEMIA: ICD-10-CM

## 2021-11-08 DIAGNOSIS — R41.3 MEMORY DEFICIT: ICD-10-CM

## 2021-11-08 DIAGNOSIS — Z12.5 ENCOUNTER FOR PROSTATE CANCER SCREENING: ICD-10-CM

## 2021-11-08 RX ORDER — LISINOPRIL 5 MG/1
TABLET ORAL
Qty: 90 TABLET | Refills: 0 | Status: SHIPPED | OUTPATIENT
Start: 2021-11-08 | End: 2022-02-01 | Stop reason: SDUPTHER

## 2021-11-09 ENCOUNTER — PATIENT MESSAGE (OUTPATIENT)
Dept: INTERNAL MEDICINE | Facility: CLINIC | Age: 86
End: 2021-11-09
Payer: COMMERCIAL

## 2021-11-12 ENCOUNTER — OFFICE VISIT (OUTPATIENT)
Dept: PODIATRY | Facility: CLINIC | Age: 86
End: 2021-11-12
Payer: COMMERCIAL

## 2021-11-12 VITALS
RESPIRATION RATE: 18 BRPM | HEIGHT: 63 IN | DIASTOLIC BLOOD PRESSURE: 70 MMHG | HEART RATE: 62 BPM | WEIGHT: 122.38 LBS | SYSTOLIC BLOOD PRESSURE: 147 MMHG | BODY MASS INDEX: 21.68 KG/M2

## 2021-11-12 DIAGNOSIS — B35.1 ONYCHOMYCOSIS DUE TO DERMATOPHYTE: Primary | ICD-10-CM

## 2021-11-12 PROCEDURE — 1126F PR PAIN SEVERITY QUANTIFIED, NO PAIN PRESENT: ICD-10-PCS | Mod: CPTII,S$GLB,, | Performed by: PODIATRIST

## 2021-11-12 PROCEDURE — 1157F PR ADVANCE CARE PLAN OR EQUIV PRESENT IN MEDICAL RECORD: ICD-10-PCS | Mod: CPTII,S$GLB,, | Performed by: PODIATRIST

## 2021-11-12 PROCEDURE — 99213 PR OFFICE/OUTPT VISIT, EST, LEVL III, 20-29 MIN: ICD-10-PCS | Mod: S$GLB,,, | Performed by: PODIATRIST

## 2021-11-12 PROCEDURE — 1159F PR MEDICATION LIST DOCUMENTED IN MEDICAL RECORD: ICD-10-PCS | Mod: CPTII,S$GLB,, | Performed by: PODIATRIST

## 2021-11-12 PROCEDURE — 1159F MED LIST DOCD IN RCRD: CPT | Mod: CPTII,S$GLB,, | Performed by: PODIATRIST

## 2021-11-12 PROCEDURE — 1126F AMNT PAIN NOTED NONE PRSNT: CPT | Mod: CPTII,S$GLB,, | Performed by: PODIATRIST

## 2021-11-12 PROCEDURE — 99999 PR PBB SHADOW E&M-EST. PATIENT-LVL III: ICD-10-PCS | Mod: PBBFAC,,, | Performed by: PODIATRIST

## 2021-11-12 PROCEDURE — 99999 PR PBB SHADOW E&M-EST. PATIENT-LVL III: CPT | Mod: PBBFAC,,, | Performed by: PODIATRIST

## 2021-11-12 PROCEDURE — 1157F ADVNC CARE PLAN IN RCRD: CPT | Mod: CPTII,S$GLB,, | Performed by: PODIATRIST

## 2021-11-12 PROCEDURE — 99213 OFFICE O/P EST LOW 20 MIN: CPT | Mod: S$GLB,,, | Performed by: PODIATRIST

## 2021-11-16 ENCOUNTER — OFFICE VISIT (OUTPATIENT)
Dept: OPTOMETRY | Facility: CLINIC | Age: 86
End: 2021-11-16
Payer: COMMERCIAL

## 2021-11-16 DIAGNOSIS — H25.13 NUCLEAR SCLEROSIS OF BOTH EYES: ICD-10-CM

## 2021-11-16 DIAGNOSIS — Z13.5 SCREENING FOR EYE CONDITION: ICD-10-CM

## 2021-11-16 DIAGNOSIS — H52.4 PRESBYOPIA OF BOTH EYES: ICD-10-CM

## 2021-11-16 DIAGNOSIS — H26.9 CORTICAL CATARACT OF BOTH EYES: Primary | ICD-10-CM

## 2021-11-16 DIAGNOSIS — H52.03 HYPEROPIA OF BOTH EYES: ICD-10-CM

## 2021-11-16 PROCEDURE — 3288F FALL RISK ASSESSMENT DOCD: CPT | Mod: CPTII,S$GLB,, | Performed by: OPTOMETRIST

## 2021-11-16 PROCEDURE — 1126F AMNT PAIN NOTED NONE PRSNT: CPT | Mod: CPTII,S$GLB,, | Performed by: OPTOMETRIST

## 2021-11-16 PROCEDURE — 92014 COMPRE OPH EXAM EST PT 1/>: CPT | Mod: S$GLB,,, | Performed by: OPTOMETRIST

## 2021-11-16 PROCEDURE — 1159F PR MEDICATION LIST DOCUMENTED IN MEDICAL RECORD: ICD-10-PCS | Mod: CPTII,S$GLB,, | Performed by: OPTOMETRIST

## 2021-11-16 PROCEDURE — 3288F PR FALLS RISK ASSESSMENT DOCUMENTED: ICD-10-PCS | Mod: CPTII,S$GLB,, | Performed by: OPTOMETRIST

## 2021-11-16 PROCEDURE — 1126F PR PAIN SEVERITY QUANTIFIED, NO PAIN PRESENT: ICD-10-PCS | Mod: CPTII,S$GLB,, | Performed by: OPTOMETRIST

## 2021-11-16 PROCEDURE — 1159F MED LIST DOCD IN RCRD: CPT | Mod: CPTII,S$GLB,, | Performed by: OPTOMETRIST

## 2021-11-16 PROCEDURE — 1101F PT FALLS ASSESS-DOCD LE1/YR: CPT | Mod: CPTII,S$GLB,, | Performed by: OPTOMETRIST

## 2021-11-16 PROCEDURE — 1101F PR PT FALLS ASSESS DOC 0-1 FALLS W/OUT INJ PAST YR: ICD-10-PCS | Mod: CPTII,S$GLB,, | Performed by: OPTOMETRIST

## 2021-11-16 PROCEDURE — 92015 PR REFRACTION: ICD-10-PCS | Mod: S$GLB,,, | Performed by: OPTOMETRIST

## 2021-11-16 PROCEDURE — 99999 PR PBB SHADOW E&M-EST. PATIENT-LVL III: ICD-10-PCS | Mod: PBBFAC,,, | Performed by: OPTOMETRIST

## 2021-11-16 PROCEDURE — 1157F ADVNC CARE PLAN IN RCRD: CPT | Mod: CPTII,S$GLB,, | Performed by: OPTOMETRIST

## 2021-11-16 PROCEDURE — 92014 PR EYE EXAM, EST PATIENT,COMPREHESV: ICD-10-PCS | Mod: S$GLB,,, | Performed by: OPTOMETRIST

## 2021-11-16 PROCEDURE — 99999 PR PBB SHADOW E&M-EST. PATIENT-LVL III: CPT | Mod: PBBFAC,,, | Performed by: OPTOMETRIST

## 2021-11-16 PROCEDURE — 1157F PR ADVANCE CARE PLAN OR EQUIV PRESENT IN MEDICAL RECORD: ICD-10-PCS | Mod: CPTII,S$GLB,, | Performed by: OPTOMETRIST

## 2021-11-16 PROCEDURE — 92015 DETERMINE REFRACTIVE STATE: CPT | Mod: S$GLB,,, | Performed by: OPTOMETRIST

## 2021-12-09 ENCOUNTER — LAB VISIT (OUTPATIENT)
Dept: LAB | Facility: HOSPITAL | Age: 86
End: 2021-12-09
Attending: HOSPITALIST
Payer: COMMERCIAL

## 2021-12-09 DIAGNOSIS — R41.3 MEMORY DEFICIT: ICD-10-CM

## 2021-12-09 DIAGNOSIS — Z12.5 ENCOUNTER FOR PROSTATE CANCER SCREENING: ICD-10-CM

## 2021-12-09 DIAGNOSIS — N18.30 STAGE 3 CHRONIC KIDNEY DISEASE, UNSPECIFIED WHETHER STAGE 3A OR 3B CKD: ICD-10-CM

## 2021-12-09 DIAGNOSIS — E78.5 DYSLIPIDEMIA: ICD-10-CM

## 2021-12-09 DIAGNOSIS — I10 ESSENTIAL HYPERTENSION: ICD-10-CM

## 2021-12-09 LAB
25(OH)D3+25(OH)D2 SERPL-MCNC: 43 NG/ML (ref 30–96)
ALBUMIN SERPL BCP-MCNC: 3.6 G/DL (ref 3.5–5.2)
ALP SERPL-CCNC: 92 U/L (ref 55–135)
ALT SERPL W/O P-5'-P-CCNC: 15 U/L (ref 10–44)
ANION GAP SERPL CALC-SCNC: 8 MMOL/L (ref 8–16)
AST SERPL-CCNC: 16 U/L (ref 10–40)
BASOPHILS # BLD AUTO: 0.03 K/UL (ref 0–0.2)
BASOPHILS NFR BLD: 1.1 % (ref 0–1.9)
BILIRUB SERPL-MCNC: 0.9 MG/DL (ref 0.1–1)
BILIRUB UR QL STRIP: NEGATIVE
BUN SERPL-MCNC: 19 MG/DL (ref 8–23)
CALCIUM SERPL-MCNC: 10 MG/DL (ref 8.7–10.5)
CHLORIDE SERPL-SCNC: 116 MMOL/L (ref 95–110)
CHOLEST SERPL-MCNC: 168 MG/DL (ref 120–199)
CHOLEST/HDLC SERPL: 2.1 {RATIO} (ref 2–5)
CLARITY UR REFRACT.AUTO: ABNORMAL
CO2 SERPL-SCNC: 17 MMOL/L (ref 23–29)
COLOR UR AUTO: YELLOW
COMPLEXED PSA SERPL-MCNC: <0.01 NG/ML (ref 0–4)
CREAT SERPL-MCNC: 1.1 MG/DL (ref 0.5–1.4)
CREAT UR-MCNC: 51 MG/DL (ref 23–375)
DIFFERENTIAL METHOD: ABNORMAL
EOSINOPHIL # BLD AUTO: 0.1 K/UL (ref 0–0.5)
EOSINOPHIL NFR BLD: 4.4 % (ref 0–8)
ERYTHROCYTE [DISTWIDTH] IN BLOOD BY AUTOMATED COUNT: 13.6 % (ref 11.5–14.5)
EST. GFR  (AFRICAN AMERICAN): >60 ML/MIN/1.73 M^2
EST. GFR  (NON AFRICAN AMERICAN): 59.2 ML/MIN/1.73 M^2
FOLATE SERPL-MCNC: 17.8 NG/ML (ref 4–24)
GLUCOSE SERPL-MCNC: 83 MG/DL (ref 70–110)
GLUCOSE UR QL STRIP: NEGATIVE
HCT VFR BLD AUTO: 38.3 % (ref 40–54)
HDLC SERPL-MCNC: 79 MG/DL (ref 40–75)
HDLC SERPL: 47 % (ref 20–50)
HGB BLD-MCNC: 11.8 G/DL (ref 14–18)
HGB UR QL STRIP: NEGATIVE
IMM GRANULOCYTES # BLD AUTO: 0 K/UL (ref 0–0.04)
IMM GRANULOCYTES NFR BLD AUTO: 0 % (ref 0–0.5)
KETONES UR QL STRIP: NEGATIVE
LDLC SERPL CALC-MCNC: 79.6 MG/DL (ref 63–159)
LEUKOCYTE ESTERASE UR QL STRIP: NEGATIVE
LYMPHOCYTES # BLD AUTO: 0.9 K/UL (ref 1–4.8)
LYMPHOCYTES NFR BLD: 32.6 % (ref 18–48)
MCH RBC QN AUTO: 29.4 PG (ref 27–31)
MCHC RBC AUTO-ENTMCNC: 30.8 G/DL (ref 32–36)
MCV RBC AUTO: 96 FL (ref 82–98)
MONOCYTES # BLD AUTO: 0.2 K/UL (ref 0.3–1)
MONOCYTES NFR BLD: 8.8 % (ref 4–15)
NEUTROPHILS # BLD AUTO: 1.5 K/UL (ref 1.8–7.7)
NEUTROPHILS NFR BLD: 53.1 % (ref 38–73)
NITRITE UR QL STRIP: NEGATIVE
NONHDLC SERPL-MCNC: 89 MG/DL
NRBC BLD-RTO: 0 /100 WBC
PH UR STRIP: 7 [PH] (ref 5–8)
PLATELET # BLD AUTO: 249 K/UL (ref 150–450)
PMV BLD AUTO: 9.5 FL (ref 9.2–12.9)
POTASSIUM SERPL-SCNC: 4.6 MMOL/L (ref 3.5–5.1)
PROT SERPL-MCNC: 7.7 G/DL (ref 6–8.4)
PROT UR QL STRIP: NEGATIVE
PROT UR-MCNC: 12 MG/DL (ref 0–15)
PROT/CREAT UR: 0.24 MG/G{CREAT} (ref 0–0.2)
RBC # BLD AUTO: 4.01 M/UL (ref 4.6–6.2)
SODIUM SERPL-SCNC: 141 MMOL/L (ref 136–145)
SP GR UR STRIP: 1.01 (ref 1–1.03)
TRIGL SERPL-MCNC: 47 MG/DL (ref 30–150)
TSH SERPL DL<=0.005 MIU/L-ACNC: 1.64 UIU/ML (ref 0.4–4)
URN SPEC COLLECT METH UR: ABNORMAL
VIT B12 SERPL-MCNC: 1284 PG/ML (ref 210–950)
WBC # BLD AUTO: 2.73 K/UL (ref 3.9–12.7)

## 2021-12-09 PROCEDURE — 82570 ASSAY OF URINE CREATININE: CPT | Performed by: HOSPITALIST

## 2021-12-09 PROCEDURE — 85025 COMPLETE CBC W/AUTO DIFF WBC: CPT | Performed by: HOSPITALIST

## 2021-12-09 PROCEDURE — 36415 COLL VENOUS BLD VENIPUNCTURE: CPT | Mod: PO | Performed by: HOSPITALIST

## 2021-12-09 PROCEDURE — 82746 ASSAY OF FOLIC ACID SERUM: CPT | Performed by: HOSPITALIST

## 2021-12-09 PROCEDURE — 82306 VITAMIN D 25 HYDROXY: CPT | Performed by: HOSPITALIST

## 2021-12-09 PROCEDURE — 84443 ASSAY THYROID STIM HORMONE: CPT | Performed by: HOSPITALIST

## 2021-12-09 PROCEDURE — 80053 COMPREHEN METABOLIC PANEL: CPT | Performed by: HOSPITALIST

## 2021-12-09 PROCEDURE — 84153 ASSAY OF PSA TOTAL: CPT | Performed by: HOSPITALIST

## 2021-12-09 PROCEDURE — 80061 LIPID PANEL: CPT | Performed by: HOSPITALIST

## 2021-12-09 PROCEDURE — 82607 VITAMIN B-12: CPT | Performed by: HOSPITALIST

## 2021-12-09 PROCEDURE — 81003 URINALYSIS AUTO W/O SCOPE: CPT | Performed by: HOSPITALIST

## 2021-12-16 ENCOUNTER — HOSPITAL ENCOUNTER (OUTPATIENT)
Dept: RADIOLOGY | Facility: HOSPITAL | Age: 86
Discharge: HOME | End: 2021-12-16
Attending: HOSPITALIST
Payer: COMMERCIAL

## 2021-12-16 ENCOUNTER — OFFICE VISIT (OUTPATIENT)
Dept: INTERNAL MEDICINE | Facility: CLINIC | Age: 86
End: 2021-12-16
Payer: COMMERCIAL

## 2021-12-16 VITALS
DIASTOLIC BLOOD PRESSURE: 60 MMHG | WEIGHT: 118.81 LBS | TEMPERATURE: 97 F | HEART RATE: 62 BPM | HEIGHT: 64 IN | OXYGEN SATURATION: 99 % | SYSTOLIC BLOOD PRESSURE: 120 MMHG | BODY MASS INDEX: 20.28 KG/M2 | RESPIRATION RATE: 20 BRPM

## 2021-12-16 DIAGNOSIS — K76.0 HEPATIC STEATOSIS: ICD-10-CM

## 2021-12-16 DIAGNOSIS — M25.441 FINGER JOINT SWELLING, RIGHT: ICD-10-CM

## 2021-12-16 DIAGNOSIS — R63.0 DECREASED APPETITE: ICD-10-CM

## 2021-12-16 DIAGNOSIS — D72.819 LEUKOPENIA, UNSPECIFIED TYPE: Chronic | ICD-10-CM

## 2021-12-16 DIAGNOSIS — Z00.00 ENCOUNTER FOR PREVENTIVE HEALTH EXAMINATION: Primary | ICD-10-CM

## 2021-12-16 DIAGNOSIS — I10 ESSENTIAL HYPERTENSION: ICD-10-CM

## 2021-12-16 DIAGNOSIS — H91.93 BILATERAL HEARING LOSS, UNSPECIFIED HEARING LOSS TYPE: ICD-10-CM

## 2021-12-16 DIAGNOSIS — N18.31 STAGE 3A CHRONIC KIDNEY DISEASE: ICD-10-CM

## 2021-12-16 PROCEDURE — 99999 PR PBB SHADOW E&M-EST. PATIENT-LVL IV: ICD-10-PCS | Mod: PBBFAC,,, | Performed by: HOSPITALIST

## 2021-12-16 PROCEDURE — 73130 X-RAY EXAM OF HAND: CPT | Mod: 26,RT,, | Performed by: RADIOLOGY

## 2021-12-16 PROCEDURE — 90471 IMMUNIZATION ADMIN: CPT | Mod: S$GLB,,, | Performed by: HOSPITALIST

## 2021-12-16 PROCEDURE — 1157F ADVNC CARE PLAN IN RCRD: CPT | Mod: CPTII,S$GLB,, | Performed by: HOSPITALIST

## 2021-12-16 PROCEDURE — 99397 PR PREVENTIVE VISIT,EST,65 & OVER: ICD-10-PCS | Mod: 25,S$GLB,, | Performed by: HOSPITALIST

## 2021-12-16 PROCEDURE — 99999 PR PBB SHADOW E&M-EST. PATIENT-LVL IV: CPT | Mod: PBBFAC,,, | Performed by: HOSPITALIST

## 2021-12-16 PROCEDURE — 73130 XR HAND COMPLETE 3 VIEW RIGHT: ICD-10-PCS | Mod: 26,RT,, | Performed by: RADIOLOGY

## 2021-12-16 PROCEDURE — 90694 FLU VACCINE - QUADRIVALENT - ADJUVANTED: ICD-10-PCS | Mod: S$GLB,,, | Performed by: HOSPITALIST

## 2021-12-16 PROCEDURE — 73130 X-RAY EXAM OF HAND: CPT | Mod: TC,PO,RT

## 2021-12-16 PROCEDURE — 90471 FLU VACCINE - QUADRIVALENT - ADJUVANTED: ICD-10-PCS | Mod: S$GLB,,, | Performed by: HOSPITALIST

## 2021-12-16 PROCEDURE — 1157F PR ADVANCE CARE PLAN OR EQUIV PRESENT IN MEDICAL RECORD: ICD-10-PCS | Mod: CPTII,S$GLB,, | Performed by: HOSPITALIST

## 2021-12-16 PROCEDURE — 99397 PER PM REEVAL EST PAT 65+ YR: CPT | Mod: 25,S$GLB,, | Performed by: HOSPITALIST

## 2021-12-16 PROCEDURE — 90694 VACC AIIV4 NO PRSRV 0.5ML IM: CPT | Mod: S$GLB,,, | Performed by: HOSPITALIST

## 2021-12-16 RX ORDER — DICLOFENAC SODIUM 10 MG/G
2 GEL TOPICAL 3 TIMES DAILY
Qty: 200 G | Refills: 11 | Status: SHIPPED | OUTPATIENT
Start: 2021-12-16 | End: 2022-12-07 | Stop reason: SDUPTHER

## 2021-12-16 RX ORDER — SODIUM BICARBONATE 650 MG/1
650 TABLET ORAL 2 TIMES DAILY
Qty: 180 TABLET | Refills: 3 | Status: SHIPPED | OUTPATIENT
Start: 2021-12-16 | End: 2023-02-07

## 2022-01-26 NOTE — TELEPHONE ENCOUNTER
No new care gaps identified.  Powered by [employer]. Reference number: [id number].   1/26/2022 12:03:26 PM CST

## 2022-02-01 NOTE — TELEPHONE ENCOUNTER
No new care gaps identified.  Powered by [employer]. Reference number: [id number].   2/01/2022 5:06:33 PM CST

## 2022-02-03 RX ORDER — LISINOPRIL 5 MG/1
5 TABLET ORAL DAILY
Qty: 90 TABLET | Refills: 1 | Status: SHIPPED | OUTPATIENT
Start: 2022-02-03 | End: 2022-08-01

## 2022-02-09 RX ORDER — LISINOPRIL 5 MG/1
TABLET ORAL
Qty: 90 TABLET | Refills: 0 | OUTPATIENT
Start: 2022-02-09

## 2022-02-17 ENCOUNTER — OFFICE VISIT (OUTPATIENT)
Dept: NEUROLOGY | Facility: CLINIC | Age: 87
End: 2022-02-17
Payer: COMMERCIAL

## 2022-02-17 DIAGNOSIS — G31.84 MILD COGNITIVE IMPAIRMENT: Primary | ICD-10-CM

## 2022-02-17 PROCEDURE — 1160F RVW MEDS BY RX/DR IN RCRD: CPT | Mod: CPTII,S$GLB,, | Performed by: PSYCHIATRY & NEUROLOGY

## 2022-02-17 PROCEDURE — 99999 PR PBB SHADOW E&M-EST. PATIENT-LVL II: CPT | Mod: PBBFAC,,, | Performed by: PSYCHIATRY & NEUROLOGY

## 2022-02-17 PROCEDURE — 1159F MED LIST DOCD IN RCRD: CPT | Mod: CPTII,S$GLB,, | Performed by: PSYCHIATRY & NEUROLOGY

## 2022-02-17 PROCEDURE — 1157F PR ADVANCE CARE PLAN OR EQUIV PRESENT IN MEDICAL RECORD: ICD-10-PCS | Mod: CPTII,S$GLB,, | Performed by: PSYCHIATRY & NEUROLOGY

## 2022-02-17 PROCEDURE — 99214 OFFICE O/P EST MOD 30 MIN: CPT | Mod: S$GLB,,, | Performed by: PSYCHIATRY & NEUROLOGY

## 2022-02-17 PROCEDURE — 99999 PR PBB SHADOW E&M-EST. PATIENT-LVL II: ICD-10-PCS | Mod: PBBFAC,,, | Performed by: PSYCHIATRY & NEUROLOGY

## 2022-02-17 PROCEDURE — 1160F PR REVIEW ALL MEDS BY PRESCRIBER/CLIN PHARMACIST DOCUMENTED: ICD-10-PCS | Mod: CPTII,S$GLB,, | Performed by: PSYCHIATRY & NEUROLOGY

## 2022-02-17 PROCEDURE — 1159F PR MEDICATION LIST DOCUMENTED IN MEDICAL RECORD: ICD-10-PCS | Mod: CPTII,S$GLB,, | Performed by: PSYCHIATRY & NEUROLOGY

## 2022-02-17 PROCEDURE — 1157F ADVNC CARE PLAN IN RCRD: CPT | Mod: CPTII,S$GLB,, | Performed by: PSYCHIATRY & NEUROLOGY

## 2022-02-17 PROCEDURE — 99214 PR OFFICE/OUTPT VISIT, EST, LEVL IV, 30-39 MIN: ICD-10-PCS | Mod: S$GLB,,, | Performed by: PSYCHIATRY & NEUROLOGY

## 2022-02-17 NOTE — MEDICAL/APP STUDENT
How to help with the memory and tips.  Pt plays chess, writes music, works on sounds as a musician. Tries to learn how to play the piano, reads music and analyzes how saxaphone, clarinet, and flute. Always dabbled with piano but since the pandemic he has been exploring how to learn. He has been teaching himself how to play better.    Wife says he beat the best chess player in town the other night. He goes to grocery store and grabs items from memory from list of 3 items.  Short term memory started noticed in early pandemic. He was a lot more active before in music scene. Comprehension he feels is not difficulty. Outgoing personality and good support group with friends and members.   Memory is more affected in detailed conversations. What did you say? Keeping up with with appointments is difficult and wife needs to remind him.    No fam history that he's aware of.  Dropped out of [employer] in 11th grade.  11 siblings. 2 sisters living.   Vaxx up to date.

## 2022-02-17 NOTE — PROGRESS NOTES
[facility] - NEUROLOGY 7TH [address] [facility], [place]    Date: February 18, 2022   Patient Name: Brent Alatorre   MRN: [id number]   PCP: Lupe Lezama  Referring Provider: Self, Aaareferral    Assessment:      This is Brent Alatorre, 89 y.o. 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     Plan:      -  Will refrain from donepezil at this time.    Follow up 6-12 months    Greater than 30 minutes spent in chart review, documentation, independent review of imaging, and face to face time with patient       I discussed side effects of the medications. I asked the patient to stop the medication if he notices serious adverse effects as we discussed and to seek immediate medical attention at an ER.     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    8/2021  -  Presents with wife who notes that he will often forget things she has told him earlier in the day, does note hearing difficulty with upcoming hearing aid evaluation  -  Continues to play music and remains a competitive chess player (recently on the cover of [employer] magazine)  -  Has not started donepezil yet  -  Does continue daily MJ use       HPI 8/2020:   Mr. Brent Alatorre is a 89 y.o. male who presents with a chief complaint of memory, presents alone and provides his own history    Patient reports he does not appreciate significant memory difficulty although his wife feels he has difficulty with short term memory for things she has recently asked him to do.  He also describes  instances in which he will enter a room and have a delay in recalling why he entered.  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  Prior to the pandemic he enjoyed going swimming on a regular basis and playing chess with friends.  He continues to drive without difficulty, wife manages finances as she always has.  No issues with sleep or mood.  Does not drink but does have occasional marijuana use.    PAST MEDICAL HISTORY:  Past Medical History:   Diagnosis Date    Anemia     Bladder cancer     Cancer     bladder    Cataracts, bilateral     Colon polyp     Glaucoma suspect of both eyes     History of kidney problems     Hypertension     Primary osteoarthritis of both hands 11/29/2017       PAST SURGICAL HISTORY:  Past Surgical History:   Procedure Laterality Date    BLADDER AUGMENTATION  1995    bladder cancer removal and colon resection prostatectomy     BLADDER SURGERY      cystectomy    COLON SURGERY  1995    CYSTOSCOPY      HERNIA REPAIR      PROSTATE SURGERY  1995       CURRENT MEDS:  Current Outpatient Medications   Medication Sig Dispense Refill    acetaminophen (TYLENOL) 500 MG tablet Take 500 mg by mouth.      amLODIPine (NORVASC) 2.5 MG tablet Take 1 tablet (2.5 mg total) by mouth once daily. 90 tablet 3    ciclopirox (PENLAC) 8 % Soln Apply topically nightly. Remove every 7 days with nail polish remover 1 Bottle 0    diclofenac sodium (VOLTAREN) 1 % Gel Apply 2 g topically 3 (three) times daily. 200 g 11    ergocalciferol (ERGOCALCIFEROL) 50,000 unit Cap Take 50,000 Units by mouth every 7 days.      ferrous sulfate 325 (65 FE) MG EC tablet Take by mouth.      lisinopriL (PRINIVIL,ZESTRIL) 5 MG tablet Take 1 tablet (5 mg total) by mouth once daily. 90 tablet 1    multivitamin capsule Take 1 capsule by mouth once daily.      sodium bicarbonate 650 MG tablet Take 1 tablet (650 mg total) by mouth 2 (two) times daily.  180 tablet 3    solifenacin (VESICARE) 10 MG tablet TAKE 1 TABLET(10 MG) BY MOUTH EVERY DAY 90 tablet 3     No current facility-administered medications for this visit.       ALLERGIES:  Review of patient's allergies indicates:   Allergen Reactions    Sulfa (sulfonamide antibiotics)      Sulfur makes pt sick.       FAMILY HISTORY:  Family History   Problem Relation Age of Onset    Diabetes Mother     Cancer Father         prostate cancer    Cancer Brother         stomach cancer     Cancer Brother         lung cancer    Heart disease Neg Hx     Colon polyps Neg Hx     Amblyopia Neg Hx     Blindness Neg Hx     Cataracts Neg Hx     Glaucoma Neg Hx     Macular degeneration Neg Hx     Retinal detachment Neg Hx     Strabismus Neg Hx        SOCIAL HISTORY:  Social History     Tobacco Use    Smoking status: Never Smoker    Smokeless tobacco: Never Used    Tobacco comment: does marijuana   Substance Use Topics    Alcohol use: Yes     Alcohol/week: 3.0 standard drinks     Types: 3 Cans of beer per week     Comment: very light drinker    Drug use: Yes     Frequency: 4.0 times per week     Types: Marijuana     Comment: Every day       Review of Systems:  12 review of systems is negative except for the symptoms mentioned in HPI.        Objective:     There were no vitals filed for this visit.    General: NAD, well nourished   Eyes: no tearing, discharge, no erythema   ENT: moist mucous membranes of the oral cavity, nares patent    Neck: Supple, full range of motion  Cardiovascular: Warm and well perfused, pulses equal and symmetrical  Lungs: Normal work of breathing, normal chest wall excursions  Skin: No rash, lesions, or breakdown on exposed skin  Psychiatry: Mood and affect are appropriate   Abdomen: soft, non tender, non distended  Extremeties: No cyanosis, clubbing or edema.    Neurological   MENTAL STATUS: Alert and appropriately conversant, provides his own history in good detail  CRANIAL NERVES:  Visual fields intact. PERRL. EOMI. Facial sensation intact. Face symmetrical. Hearing mildly impaired. Full shoulder shrug bilaterally. Tongue protrudes midline   SENSORY: Sensation is intact to light touch throughout.   Negative Romberg.   MOTOR: Normal bulk and tone. No pronator drift.      CEREBELLAR/COORDINATION/GAIT: Gait steady with normal arm swing and stride length.

## 2022-02-21 ENCOUNTER — OUTPATIENT CASE MANAGEMENT (OUTPATIENT)
Dept: NEUROLOGY | Facility: CLINIC | Age: 87
End: 2022-02-21
Payer: COMMERCIAL

## 2022-02-21 NOTE — PROGRESS NOTES
Social Work - Dementia Care Management:    Referral received from Dr. Alexander to provide support info for pt's wife regarding MCI/memory issues. Phoned wife and left voice mail with encouragement to contact me.

## 2022-02-28 ENCOUNTER — OUTPATIENT CASE MANAGEMENT (OUTPATIENT)
Dept: NEUROLOGY | Facility: CLINIC | Age: 87
End: 2022-02-28
Payer: COMMERCIAL

## 2022-02-28 NOTE — PROGRESS NOTES
Social Work - Dementia Care Management:    Return VM received from caregiver, wife Magui, on 2/24/22. Phoned wife today; she was in a store. Offered to schedule phone consult. Scheduled for Thursday, 3/3/22, 3:00 p.m.

## 2022-03-03 ENCOUNTER — OUTPATIENT CASE MANAGEMENT (OUTPATIENT)
Dept: NEUROLOGY | Facility: CLINIC | Age: 87
End: 2022-03-03
Payer: COMMERCIAL

## 2022-03-03 NOTE — PROGRESS NOTES
"Social Work - Dementia Care Management:    Patient referred by Dr. Alexander on 2/18/22 for assessment of care management needs. As arranged via voice mail exchanges, spoke with caregiver, wife Magui, on phone on phone on 3/3/22.  Wife reported her understanding is that pt is not yet at the level of a dementia diagnosis. However she is interested in getting education to be prepared, to understand what she may be dealing with, and to make sure her approach to pt is helpful.  Pt has some good days and some bad. He is forgetful and gets frustrated. 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 However, both wife and bandmates have observed that he is at a point where he will need to be eased out of performing.   Wife has Healthcare Power of , but does not yet have financial PoA.    Provided initial education and tips, and informed of support groups and education series.   Discussed ways to "substitute, not subtract," in regards to pt's music activities, i.e. for bandmates to give him roles of consultation, teaching, etc.  Advised of importance of addressing the financial PoA asap, while pt is still able to understand and participate in the process.  Advised I would email general resources and information.  Case will not be opened, but wife encouraged to contact me prn.    ADDENDUM:  Emailed mimi for Dementia Education Series and Dementia Caregiver Support Group.    "

## 2022-03-09 ENCOUNTER — OUTPATIENT CASE MANAGEMENT (OUTPATIENT)
Dept: NEUROLOGY | Facility: CLINIC | Age: 87
End: 2022-03-09
Payer: COMMERCIAL

## 2022-03-09 NOTE — PROGRESS NOTES
" Social Work - Dementia Care Management:    Emailed to caregiver, wife Magui:  1. [place] for in-person Dementia Caregivers Support Group  2. [place] for "Finding Meaning and Hope" discussion series  3. Two general Tip Sheets  4. Home Safety Checklist  5. "Technology for Caregivers"guide  6. Link to [employer] "The Memory Handbook"  7. [employer] website  8. [employer] website  9. Teepa Snow video info  10. Teepa Snow podcast link    Encouraged wife to contact me as needed.    "

## 2022-04-19 ENCOUNTER — OFFICE VISIT (OUTPATIENT)
Dept: INTERNAL MEDICINE | Facility: CLINIC | Age: 87
End: 2022-04-19
Payer: COMMERCIAL

## 2022-04-19 VITALS
DIASTOLIC BLOOD PRESSURE: 74 MMHG | SYSTOLIC BLOOD PRESSURE: 128 MMHG | WEIGHT: 114 LBS | TEMPERATURE: 99 F | OXYGEN SATURATION: 97 % | RESPIRATION RATE: 18 BRPM | HEART RATE: 96 BPM | HEIGHT: 64 IN | BODY MASS INDEX: 19.46 KG/M2

## 2022-04-19 DIAGNOSIS — J06.9 UPPER RESPIRATORY TRACT INFECTION, UNSPECIFIED TYPE: Primary | ICD-10-CM

## 2022-04-19 PROCEDURE — 1159F MED LIST DOCD IN RCRD: CPT | Mod: CPTII,S$GLB,, | Performed by: FAMILY MEDICINE

## 2022-04-19 PROCEDURE — 1101F PT FALLS ASSESS-DOCD LE1/YR: CPT | Mod: CPTII,S$GLB,, | Performed by: FAMILY MEDICINE

## 2022-04-19 PROCEDURE — 3288F PR FALLS RISK ASSESSMENT DOCUMENTED: ICD-10-PCS | Mod: CPTII,S$GLB,, | Performed by: FAMILY MEDICINE

## 2022-04-19 PROCEDURE — 1160F RVW MEDS BY RX/DR IN RCRD: CPT | Mod: CPTII,S$GLB,, | Performed by: FAMILY MEDICINE

## 2022-04-19 PROCEDURE — 1159F PR MEDICATION LIST DOCUMENTED IN MEDICAL RECORD: ICD-10-PCS | Mod: CPTII,S$GLB,, | Performed by: FAMILY MEDICINE

## 2022-04-19 PROCEDURE — 1160F PR REVIEW ALL MEDS BY PRESCRIBER/CLIN PHARMACIST DOCUMENTED: ICD-10-PCS | Mod: CPTII,S$GLB,, | Performed by: FAMILY MEDICINE

## 2022-04-19 PROCEDURE — 1126F AMNT PAIN NOTED NONE PRSNT: CPT | Mod: CPTII,S$GLB,, | Performed by: FAMILY MEDICINE

## 2022-04-19 PROCEDURE — 99999 PR PBB SHADOW E&M-EST. PATIENT-LVL V: ICD-10-PCS | Mod: PBBFAC,,, | Performed by: FAMILY MEDICINE

## 2022-04-19 PROCEDURE — 1157F ADVNC CARE PLAN IN RCRD: CPT | Mod: CPTII,S$GLB,, | Performed by: FAMILY MEDICINE

## 2022-04-19 PROCEDURE — 99999 PR PBB SHADOW E&M-EST. PATIENT-LVL V: CPT | Mod: PBBFAC,,, | Performed by: FAMILY MEDICINE

## 2022-04-19 PROCEDURE — 99214 OFFICE O/P EST MOD 30 MIN: CPT | Mod: S$GLB,,, | Performed by: FAMILY MEDICINE

## 2022-04-19 PROCEDURE — 1126F PR PAIN SEVERITY QUANTIFIED, NO PAIN PRESENT: ICD-10-PCS | Mod: CPTII,S$GLB,, | Performed by: FAMILY MEDICINE

## 2022-04-19 PROCEDURE — 1101F PR PT FALLS ASSESS DOC 0-1 FALLS W/OUT INJ PAST YR: ICD-10-PCS | Mod: CPTII,S$GLB,, | Performed by: FAMILY MEDICINE

## 2022-04-19 PROCEDURE — 99214 PR OFFICE/OUTPT VISIT, EST, LEVL IV, 30-39 MIN: ICD-10-PCS | Mod: S$GLB,,, | Performed by: FAMILY MEDICINE

## 2022-04-19 PROCEDURE — 1157F PR ADVANCE CARE PLAN OR EQUIV PRESENT IN MEDICAL RECORD: ICD-10-PCS | Mod: CPTII,S$GLB,, | Performed by: FAMILY MEDICINE

## 2022-04-19 PROCEDURE — 3288F FALL RISK ASSESSMENT DOCD: CPT | Mod: CPTII,S$GLB,, | Performed by: FAMILY MEDICINE

## 2022-04-19 NOTE — PROGRESS NOTES
Subjective:       Patient ID: Brent Alatorre is a 89 y.o. male.    Chief Complaint: Cough and Nasal Congestion    HPI 89-year-old  patient of Dr. Lezama presents to clinic today accompanied by his wife secondary to concerns of increased nasal congestion, mild hoarseness, and cough which began on Sunday.  He has been using Mucinex and Tylenol with mild relief.  Review of Systems   Constitutional: Negative for appetite change, chills, fatigue and fever.   HENT: Positive for nasal congestion and voice change (Hoarseness). Negative for ear pain, hearing loss, postnasal drip, rhinorrhea, sinus pressure/congestion, sore throat and tinnitus.    Eyes: Negative for redness, itching and visual disturbance.   Respiratory: Positive for cough. Negative for chest tightness and shortness of breath.    Cardiovascular: Negative for chest pain and palpitations.   Gastrointestinal: Negative for abdominal pain, constipation, diarrhea, nausea and vomiting.   Genitourinary: Negative for decreased urine volume, difficulty urinating, dysuria, frequency, hematuria and urgency.   Musculoskeletal: Negative for back pain, myalgias, neck pain and neck stiffness.   Integumentary:  Negative for rash.   Neurological: Negative for dizziness, light-headedness and headaches.   Psychiatric/Behavioral: Negative.          Objective:      Physical Exam  Vitals and nursing note reviewed.   Constitutional:       General: He is not in acute distress.     Appearance: He is well-developed. He is not diaphoretic.   HENT:      Head: Normocephalic and atraumatic.      Right Ear: External ear normal.      Left Ear: External ear normal.      Nose: Nose normal.      Right Turbinates: Swollen.      Left Turbinates: Swollen.      Mouth/Throat:      Pharynx: No oropharyngeal exudate.   Eyes:      General: No scleral icterus.        Right eye: No discharge.         Left eye: No discharge.      Conjunctiva/sclera: Conjunctivae normal.      Pupils:  Pupils are equal, round, and reactive to light.   Neck:      Thyroid: No thyromegaly.      Vascular: No JVD.      Trachea: No tracheal deviation.   Cardiovascular:      Rate and Rhythm: Normal rate and regular rhythm.      Heart sounds: Normal heart sounds. No murmur heard.    No friction rub. No gallop.   Pulmonary:      Effort: Pulmonary effort is normal. No respiratory distress.      Breath sounds: Normal breath sounds. No stridor. No wheezing or rales.   Abdominal:      General: Bowel sounds are normal. There is no distension.      Palpations: Abdomen is soft. There is no mass.      Tenderness: There is no abdominal tenderness. There is no guarding or rebound.   Musculoskeletal:         General: No tenderness. Normal range of motion.      Cervical back: Normal range of motion and neck supple.   Lymphadenopathy:      Cervical: No cervical adenopathy.   Skin:     General: Skin is warm and dry.      Coloration: Skin is not pale.      Findings: No erythema or rash.   Neurological:      Mental Status: He is alert and oriented to person, place, and time.   Psychiatric:         Behavior: Behavior normal.         Thought Content: Thought content normal.         Judgment: Judgment normal.         Assessment:       Problem List Items Addressed This Visit    None     Visit Diagnoses     Upper respiratory tract infection, unspecified type    -  Primary          Plan:       1. Continue Mucinex as needed for cough.  2. Start over-the-counter Flonase nasal spray 2 sprays per nostril daily and over-the-counter Claritin, Allegra, or Zyrtec nightly.  3. Return to clinic as needed if symptoms persist or worsen.

## 2022-04-19 NOTE — PATIENT INSTRUCTIONS
Continue Mucinex as needed for cough.  Flonase nasal spray 2 sprays per nostril daily.  Claritin, Allegra, or Zyrtec nightly.

## 2022-04-29 ENCOUNTER — TELEPHONE (OUTPATIENT)
Dept: INTERNAL MEDICINE | Facility: CLINIC | Age: 87
End: 2022-04-29
Payer: COMMERCIAL

## 2022-04-29 ENCOUNTER — OFFICE VISIT (OUTPATIENT)
Dept: URGENT CARE | Facility: CLINIC | Age: 87
End: 2022-04-29
Payer: COMMERCIAL

## 2022-04-29 VITALS
TEMPERATURE: 98 F | HEIGHT: 64 IN | DIASTOLIC BLOOD PRESSURE: 79 MMHG | HEART RATE: 86 BPM | WEIGHT: 113 LBS | OXYGEN SATURATION: 96 % | BODY MASS INDEX: 19.29 KG/M2 | RESPIRATION RATE: 20 BRPM | SYSTOLIC BLOOD PRESSURE: 115 MMHG

## 2022-04-29 DIAGNOSIS — U07.1 COVID-19 VIRUS DETECTED: ICD-10-CM

## 2022-04-29 DIAGNOSIS — R05.9 COUGH: Primary | ICD-10-CM

## 2022-04-29 DIAGNOSIS — U07.1 COVID-19 VIRUS INFECTION: Primary | ICD-10-CM

## 2022-04-29 DIAGNOSIS — U07.1 COVID: ICD-10-CM

## 2022-04-29 LAB
CTP QC/QA: YES
SARS-COV-2 RDRP RESP QL NAA+PROBE: POSITIVE

## 2022-04-29 PROCEDURE — U0002 COVID-19 LAB TEST NON-CDC: HCPCS | Mod: QW,S$GLB,, | Performed by: NURSE PRACTITIONER

## 2022-04-29 PROCEDURE — 1157F ADVNC CARE PLAN IN RCRD: CPT | Mod: CPTII,S$GLB,, | Performed by: NURSE PRACTITIONER

## 2022-04-29 PROCEDURE — 99213 OFFICE O/P EST LOW 20 MIN: CPT | Mod: S$GLB,,, | Performed by: NURSE PRACTITIONER

## 2022-04-29 PROCEDURE — 1159F MED LIST DOCD IN RCRD: CPT | Mod: CPTII,S$GLB,, | Performed by: NURSE PRACTITIONER

## 2022-04-29 PROCEDURE — U0002: ICD-10-PCS | Mod: QW,S$GLB,, | Performed by: NURSE PRACTITIONER

## 2022-04-29 PROCEDURE — 99213 PR OFFICE/OUTPT VISIT, EST, LEVL III, 20-29 MIN: ICD-10-PCS | Mod: S$GLB,,, | Performed by: NURSE PRACTITIONER

## 2022-04-29 PROCEDURE — 1159F PR MEDICATION LIST DOCUMENTED IN MEDICAL RECORD: ICD-10-PCS | Mod: CPTII,S$GLB,, | Performed by: NURSE PRACTITIONER

## 2022-04-29 PROCEDURE — 1157F PR ADVANCE CARE PLAN OR EQUIV PRESENT IN MEDICAL RECORD: ICD-10-PCS | Mod: CPTII,S$GLB,, | Performed by: NURSE PRACTITIONER

## 2022-04-29 NOTE — PROGRESS NOTES
"Subjective:       Patient ID: Brent Alatorre is a 89 y.o. male.    Vitals:  height is 5' 4" (1.626 m) and weight is 51.3 kg (113 lb). His temperature is 97.9 °F (36.6 °C). His blood pressure is 115/79 and his pulse is 86. His respiration is 20 and oxygen saturation is 96%.     Chief Complaint: Cough    This is a 89 y.o. male   who presents today with a chief complaint of a cough that began five days ago. He's also complaining of fatigue. He states that he took a COVID test at home that came back positive. He's been taking Mucinex, Tylenol and Flonase to help relive his symptoms.     Cough  This is a new problem. The current episode started in the past 7 days. The problem has been gradually worsening. The problem occurs every few minutes. The cough is non-productive. Nothing aggravates the symptoms. Treatments tried: mucinex, tylenol and flonase. The treatment provided mild relief.       Constitution: Positive for fatigue.   Respiratory: Negative for cough.        Objective:      Physical Exam   HENT:   Head: Normocephalic and atraumatic.   Ears:   Right Ear: External ear normal.   Left Ear: External ear normal.   Pulmonary/Chest: Effort normal and breath sounds normal. No stridor. No respiratory distress. He has no wheezes. He has no rhonchi. He has no rales. He exhibits no tenderness.   Abdominal: Normal appearance.   Neurological: He is alert.   Nursing note and vitals reviewed.        Results for orders placed or performed in visit on 04/29/22   POCT COVID-19 Rapid Screening   Result Value Ref Range    POC Rapid COVID Positive (A) Negative     Acceptable Yes      Assessment:       1. Cough    2. COVID-19 virus detected          Plan:       Covid positive on today. Pt seen on 4/19 at PCP with same symptoms. He was not tested for Covid at the time. Denies any worsening or new symptoms. As of today, pt has completed isolation period. Advised to follow up as needed.   Cough  -     POCT COVID-19 " Rapid Screening    COVID-19 virus detected               YOU HAVE COMPLETED YOUR ISOLATION PERIOD SINCE YOUR SYMPTOMS STARTED ON 4/19/2022      Patient Instructions     PLEASE READ YOUR DISCHARGE INSTRUCTIONS ENTIRELY AS IT CONTAINS IMPORTANT INFORMATION.    Patient had covid testing done today.  Discussed corona virus precautions and reviewed CDC FAC; printed a copy for patient.  I discussed to continue to monitor their symptoms. Discussed that if their symptoms persist or worsen to seek re-evaluation. Clinic vs. ER precautions were given.  Patient verbalized understanding and agreed with the entire plan of care.    If Negative and no direct exposure: symptom free without fever reducing meds in 24 hours - can go back to work in 24 hours with surgical mask for 10-14 days.    If Positive and significantly symptomatic: improved symptoms, no fever without fever reducing meds for 24 hours- have to be out for a minimum of 10 days passed from + test  with improvements in symptoms. MAY COME OUT OF QUARANTINE ON DAY 11.      If you tested positive and have symptoms, you must isolate for 5 days starting on the day of your first symptoms  OR day of the positive test if you are asymptomatic. After 5 days (ON DAY #6), if your symptoms have improved and you have not had fever on day 5, you can return to the community on day #6- NO TESTING REQUIRED to return to community! If no fever without fever reducing meds for 24 hours, before coming out of quarantine. After your 5 days of isolation are completed, the CDC recommends strict mask use for the first 5 days (day #6-10) that you come out of isolation.  MAY COME OUT OF QUARANTINE ON DAY#6 DATE** BUT CONTINUE STRICT MASKS FOR ANOTHER 5 DAYS. QUARANTINE START DATE**    This is the most important part, both the CDC and the [facility] emphasize that you do not test out of isolation. In fact, we do not retest if you were positive in the last 90 days.           - Reviewed radiographs and all  diagnostic testing with patient/family.    - Rest.  Drink plenty of fluids.    - Tylenol OR anti-inflammatory (NSAIDs, ibuprofen, aleve, motrin) as directed as needed for fever/pain.  For Tylenol, do not exceed 3000 mg/ day. If no contraindication or allergies.    - continue albuterol inhaler as needed for shortness of breath/wheezing  - do not operate machinery when taking cough syrup or pain meds as they may cause drowsiness.  - take Tessalon as needed for cough suppression. Take cough syrup as needed for cough during at night.     - If you were prescribed antibiotics, please take them to completion. Please supplement with OTC probiotics and yogurt.  Contact clinic if develop profuse diarrhea and weakness.  - If you are female and on birth control pills - please use additional methods of contraception to prevent pregnancy while on antibiotics and for one cycle after.     -Below are suggestions for symptomatic relief:              -Salt water gargles to soothe throat pain.              -Chloroseptic spray also helps to numb throat pain.              -Nasal saline spray reduces inflammation and dryness.              -Warm face compresses to help with facial sinus pain/pressure.              -Vicks vapor rub at night.              -Astelin NASAL SPRAY twice day for nasal/sinus congestion   **may also supplement with 2nd OTC nasal spray to help with inflammation and congestion. Wean to off when you nose becomes to dry or bleed. Also use nasal saline twice a day to help with dryness.               -Flonase OTC or Nasacort OTC  once a day for nasal/sinus congestion. DON'T USE IF YOU HAVE GLAUCOMA. CHECK WITH YOUR PHARMACIST/PHYSICIAN.              -Simple foods like chicken noodle soup.              -Mucinex DM (ANY COUGH EXPECTORANT) for cough or chest congestion with mucus during the day time. Delsym or robitussin (ANY COUGH SUPPRESSANT) helps with coughing at night. Mucinex-D if you have chest congestion or sputum  (caution if history of high blood pressure or palpitations).              -Zyrtec/Claritin/xyzal during the day time  & Benadryl at night (only if severe runny nose) may help with allergies and runny nose. Add decongestant if you have nasal/sinus congestion/sinus pressure/ear fullness sensation. (see below)              -may take OTC meclizine as needed for dizziness or nausea.     Caution with use of Decongestant meds:  -If you DO NOT have Hypertension or any history of palpitations, it is ok to take over the counter Sudafed or Mucinex D or Allegra-D or Claritin-D or Zyrtec-D.  -If you do take one of the above, it is ok to combine that with plain over the counter Mucinex or Allegra or Claritin or Zyrtec. If, for example, you are taking Zyrtec -D, you can combine that with Mucinex, but not Mucinex-D.  If you are taking Mucinex-D, you can combine that with plain Allegra or Claritin or Zyrtec.     -Do not combine pseudophed or phenylephrine with any other brand allergy-D for DECONGESTANT.   -Or vice versa, you can you take plain allergy medications (allegra/claritin/zyrtec with NO Decongestant) and ADD OTC pseudophed or phenelyphrine 2-3 times a day. Avoid taking decongestant late at night or with caffeine as it can keep you up or cause jittery feeling.    -If you DO have Hypertension , anxiety, or palpitations, it is safe to take Coricidin HBP for relief of sinus symptoms.      For your GI symptoms:  -Use gatorade/pedialyte or rehydration packets to help stay hydrated. Vitamin water and plain water do not contain rehydrating electrolytes.  -Increase clear liquids (water, gatorade, pedialyte, broths, jello, etc) Hold off on solids for 12-18 hours. Then advance to BRAT diet (banana, rice, applesauce, tea, toast/crackers), then advance further as tolerated. Avoid spicy or fatty foods.   -May take Emitrol OTC as needed for nausea.   -Use Peptobismol or Immodium to help alleviate your diarrhea symptoms.   -Take mylanta or  simethicone for bloating or gas pain.   -Take pepcid or omeprazole if you have heartburn or reflux sensation.  -Avoid imodium unless you have more than 6 loose stools in 24 hours. Take 1 dose and monitor to see if you can repeat AS IT WILL CAUSE CONSTIPATION.  -Wash hands frequently while sick. Avoid ibuprofen or other NSAIDS until you are well.   -Please go to the ER if you experience worsening abdominal pain, blood in your vomit or stool, high fever, dizziness, fainting, swelling of your abdomen, inability to pass gas or stool, or inability to urinate.         -You must understand that you've received an Urgent Care treatment only and that you may be released before all your medical problems are known or treated. You, the patient, will arrange for follow up care as instructed. Please arrange follow up with your primary medical clinic within 2-5 days if your signs and symptoms have not resolved or worsen.     - Follow up with your PCP or specialty clinic as directed.  You can call [phone] or [phone] to schedule an appointment with the appropriate provider.  Schedule [facility]ENTER is open Mon-Friday 8-5pm (excluded holidays).    - If your condition worsens or fails to improve we recommend that you receive another evaluation at the emergency room immediately or contact your primary medical clinic to discuss your concerns.            Prevention steps for patients with confirmed or suspected COVID-19   Stay home and stay away from family members and friends. The CDC says, you can leave home after these three things have happened: 1) You have had no fever for at least 24 hours (that is one full day of no fever without the use of medicine that reduces fevers) 2) AND other symptoms have improved (for example, when your cough or shortness of breath have improved) 3) AND at least 10 days have passed since your symptoms first appeared OR after 7-10 days passed from first positive test.   Separate yourself from other  people and animals in your home.   Call ahead before visiting your doctor.   Wear a facemask.   Cover your coughs and sneezes.   Wash your hands often with soap and water; hand  can be used, too.   Avoid sharing personal household items.   Wipe down surfaces used daily.   Monitor your symptoms. Seek prompt medical attention if your illness is worsening (e.g., difficulty breathing).    Before seeking care, call your healthcare provider.   If you have a medical emergency and need to call 911, notify the dispatch personnel that you have, or are being evaluated for COVID-19. If possible, put on a facemask before emergency medical services arrive.        Recommended precautions for household members, intimate partners, and caregivers in a home setting of a patient with symptomatic laboratory-confirmed COVID-19 or a patient under investigation.  Household members, intimate partners, and caregivers in the home setting awaiting tests results have close contact with a person with symptomatic, laboratory-confirmed COVID-19 or a person under investigation. Close contacts should monitor their health; they should call their provider right away if they develop symptoms suggestive of COVID-19 (e.g., fever, cough, shortness of breath).    Close contacts should also follow these recommendations:   Make sure that you understand and can help the patient follow their provider's instructions for medication(s) and care. You should help the patient with basic needs in the home and provide support for getting groceries, prescriptions, and other personal needs.   Monitor the patient's symptoms. If the patient is getting sicker, call his or her healthcare provider and tell them that the patient has laboratory-confirmed COVID-19. If the patient has a medical emergency and you need to call 911, notify the dispatch personnel that the patient has, or is being evaluated for COVID-19.   Household members should stay in another  room or be  from the patient. Household members should use a separate bedroom and bathroom, if available.   Prohibit visitors.   Household members should care for any pets in the home.   Make sure that shared spaces in the home have good air flow, such as by an air conditioner or an opened window, weather permitting.   Perform hand hygiene frequently. Wash your hands often with soap and water for at least 20 seconds or use an alcohol-based hand  (that contains > 60% alcohol) covering all surfaces of your hands and rubbing them together until they feel dry. Soap and water should be used preferentially.   Avoid touching your eyes, nose, and mouth.   The patient should wear a facemask. If the patient is not able to wear a facemask (for example, because it causes trouble breathing), caregivers should wear a mask when they are in the same room as the patient.   Wear a disposable facemask and gloves when you touch or have contact with the patient's blood, stool, or body fluids, such as saliva, sputum, nasal mucus, vomit, urine.  o Throw out disposable facemasks and gloves after using them. Do not reuse.  o When removing personal protective equipment, first remove and dispose of gloves. Then, immediately clean your hands with soap and water or alcohol-based hand . Next, remove and dispose of facemask, and immediately clean your hands again with soap and water or alcohol-based hand .   You should not share dishes, drinking glasses, cups, eating utensils, towels, bedding, or other items with the patient. After the patient uses these items, you should wash them thoroughly (see below Wash laundry thoroughly).   Clean all high-touch surfaces, such as counters, tabletops, doorknobs, bathroom fixtures, toilets, phones, keyboards, tablets, and bedside tables, every day. Also, clean any surfaces that may have blood, stool, or body fluids on them.   Use a household cleaning spray or  wipe, according to the label instructions. Labels contain instructions for safe and effective use of the cleaning product including precautions you should take when applying the product, such as wearing gloves and making sure you have good ventilation during use of the product.   Wash laundry thoroughly.  o Immediately remove and wash clothes or bedding that have blood, stool, or body fluids on them.  o Wear disposable gloves while handling soiled items and keep soiled items away from your body. Clean your hands (with soap and water or an alcohol-based hand ) immediately after removing your gloves.  o Read and follow directions on labels of laundry or clothing items and detergent. In general, using a normal laundry detergent according to washing machine instructions and dry thoroughly using the warmest temperatures recommended on the clothing label.   Place all used disposable gloves, facemasks, and other contaminated items in a lined container before disposing of them with other household waste. Clean your hands (with soap and water or an alcohol-based hand ) immediately after handling these items. Soap and water should be used preferentially if hands are visibly dirty.   Discuss any additional questions with your state or local health department or healthcare provider. Check available hours when contacting your local health department.    For more information see CDC link below.      https://www.cdc.gov/coronavirus/2019-ncov/hcp/guidance-prevent-spread.html#precautions        Sources:  [facility], [facility]          Instructions for Home Care of Patients and Caretakers with Coronavirus Disease 2019   Limit visitors to the home.  Older persons and those that have chronic medical conditions such as diabetes, lung and heart disease are at increased risk for illness.    If possible, patients should use a separate bedroom while recovering. Caregivers and household  members should avoid prolonged contact with the patient which means to stay 6 feet away and avoid contact with cough droplets.  When close contact is necessary, wash your hands before and immediately after contact.    Perform hand hygiene frequently. Wash your hands often with soap and water for at least 20 seconds or use an alcohol-based hand , covering all surfaces of your hands and rubbing them together until they feel dry.    Avoid touching your eyes, nose, and mouth with unwashed hands.   Avoid sharing household items with the patient. You should not share dishes, drinking glasses, cups, eating utensils, towels, bedding, or other items. After the patient uses these items, you should wash them thoroughly.   Wash laundry thoroughly.   o Immediately remove and wash clothes or bedding that have blood, stool, or body fluids on them.   Clean all high-touch surfaces, such as counters, tabletops, doorknobs, bathroom fixtures, toilets, phones, keyboards, tablets, and bedside tables, every day.   o Use a household cleaning spray or wipe, according to the label instructions. Labels contain instructions for safe and effective use of the cleaning product including precautions you should take when applying the product, such as wearing gloves and making sure you have good ventilation during use of the product.    For more information see CDC link below.      https://www.cdc.gov/coronavirus/2019-ncov/hcp/guidance-prevent-spread.html#precautions               If your symptoms worsen or if you have any other concerns, please contact [facility] at [phone].

## 2022-04-29 NOTE — TELEPHONE ENCOUNTER
Please call pt and let him know that will place order for monoclonal antibody infusion.   Recommend to quarantine for 5 days.   Also enrolled in home monitoring program   If develops high fever, sob, chest pain etc should go to the ER .    Left msg with 's comments on all above.  Also told about after hours triage nurse to handle any questions/concerns.

## 2022-04-29 NOTE — PATIENT INSTRUCTIONS
PLEASE READ YOUR DISCHARGE INSTRUCTIONS ENTIRELY AS IT CONTAINS IMPORTANT INFORMATION.    Patient had covid testing done today.  Discussed corona virus precautions and reviewed CDC FAC; printed a copy for patient.  I discussed to continue to monitor their symptoms. Discussed that if their symptoms persist or worsen to seek re-evaluation. Clinic vs. ER precautions were given.  Patient verbalized understanding and agreed with the entire plan of care.    If Negative and no direct exposure: symptom free without fever reducing meds in 24 hours - can go back to work in 24 hours with surgical mask for 10-14 days.    If Positive and significantly symptomatic: improved symptoms, no fever without fever reducing meds for 24 hours- have to be out for a minimum of 10 days passed from + test  with improvements in symptoms. MAY COME OUT OF QUARANTINE ON DAY 11.      If you tested positive and have symptoms, you must isolate for 5 days starting on the day of your first symptoms  OR day of the positive test if you are asymptomatic. After 5 days (ON DAY #6), if your symptoms have improved and you have not had fever on day 5, you can return to the community on day #6- NO TESTING REQUIRED to return to community! If no fever without fever reducing meds for 24 hours, before coming out of quarantine. After your 5 days of isolation are completed, the CDC recommends strict mask use for the first 5 days (day #6-10) that you come out of isolation.  MAY COME OUT OF QUARANTINE ON DAY#6 DATE** BUT CONTINUE STRICT MASKS FOR ANOTHER 5 DAYS. QUARANTINE START DATE**    This is the most important part, both the CDC and the [facility] emphasize that you do not test out of isolation. In fact, we do not retest if you were positive in the last 90 days.           - Reviewed radiographs and all diagnostic testing with patient/family.    - Rest.  Drink plenty of fluids.    - Tylenol OR anti-inflammatory (NSAIDs, ibuprofen, aleve, motrin) as directed as needed  for fever/pain.  For Tylenol, do not exceed 3000 mg/ day. If no contraindication or allergies.    - continue albuterol inhaler as needed for shortness of breath/wheezing  - do not operate machinery when taking cough syrup or pain meds as they may cause drowsiness.  - take Tessalon as needed for cough suppression. Take cough syrup as needed for cough during at night.     - If you were prescribed antibiotics, please take them to completion. Please supplement with OTC probiotics and yogurt.  Contact clinic if develop profuse diarrhea and weakness.  - If you are female and on birth control pills - please use additional methods of contraception to prevent pregnancy while on antibiotics and for one cycle after.     -Below are suggestions for symptomatic relief:              -Salt water gargles to soothe throat pain.              -Chloroseptic spray also helps to numb throat pain.              -Nasal saline spray reduces inflammation and dryness.              -Warm face compresses to help with facial sinus pain/pressure.              -Vicks vapor rub at night.              -Astelin NASAL SPRAY twice day for nasal/sinus congestion   **may also supplement with 2nd OTC nasal spray to help with inflammation and congestion. Wean to off when you nose becomes to dry or bleed. Also use nasal saline twice a day to help with dryness.               -Flonase OTC or Nasacort OTC  once a day for nasal/sinus congestion. DON'T USE IF YOU HAVE GLAUCOMA. CHECK WITH YOUR PHARMACIST/PHYSICIAN.              -Simple foods like chicken noodle soup.              -Mucinex DM (ANY COUGH EXPECTORANT) for cough or chest congestion with mucus during the day time. Delsym or robitussin (ANY COUGH SUPPRESSANT) helps with coughing at night. Mucinex-D if you have chest congestion or sputum (caution if history of high blood pressure or palpitations).              -Zyrtec/Claritin/xyzal during the day time  & Benadryl at night (only if severe runny nose) may  help with allergies and runny nose. Add decongestant if you have nasal/sinus congestion/sinus pressure/ear fullness sensation. (see below)              -may take OTC meclizine as needed for dizziness or nausea.     Caution with use of Decongestant meds:  -If you DO NOT have Hypertension or any history of palpitations, it is ok to take over the counter Sudafed or Mucinex D or Allegra-D or Claritin-D or Zyrtec-D.  -If you do take one of the above, it is ok to combine that with plain over the counter Mucinex or Allegra or Claritin or Zyrtec. If, for example, you are taking Zyrtec -D, you can combine that with Mucinex, but not Mucinex-D.  If you are taking Mucinex-D, you can combine that with plain Allegra or Claritin or Zyrtec.     -Do not combine pseudophed or phenylephrine with any other brand allergy-D for DECONGESTANT.   -Or vice versa, you can you take plain allergy medications (allegra/claritin/zyrtec with NO Decongestant) and ADD OTC pseudophed or phenelyphrine 2-3 times a day. Avoid taking decongestant late at night or with caffeine as it can keep you up or cause jittery feeling.    -If you DO have Hypertension , anxiety, or palpitations, it is safe to take Coricidin HBP for relief of sinus symptoms.      For your GI symptoms:  -Use gatorade/pedialyte or rehydration packets to help stay hydrated. Vitamin water and plain water do not contain rehydrating electrolytes.  -Increase clear liquids (water, gatorade, pedialyte, broths, jello, etc) Hold off on solids for 12-18 hours. Then advance to BRAT diet (banana, rice, applesauce, tea, toast/crackers), then advance further as tolerated. Avoid spicy or fatty foods.   -May take Emitrol OTC as needed for nausea.   -Use Peptobismol or Immodium to help alleviate your diarrhea symptoms.   -Take mylanta or simethicone for bloating or gas pain.   -Take pepcid or omeprazole if you have heartburn or reflux sensation.  -Avoid imodium unless you have more than 6 loose stools in  24 hours. Take 1 dose and monitor to see if you can repeat AS IT WILL CAUSE CONSTIPATION.  -Wash hands frequently while sick. Avoid ibuprofen or other NSAIDS until you are well.   -Please go to the ER if you experience worsening abdominal pain, blood in your vomit or stool, high fever, dizziness, fainting, swelling of your abdomen, inability to pass gas or stool, or inability to urinate.         -You must understand that you've received an Urgent Care treatment only and that you may be released before all your medical problems are known or treated. You, the patient, will arrange for follow up care as instructed. Please arrange follow up with your primary medical clinic within 2-5 days if your signs and symptoms have not resolved or worsen.     - Follow up with your PCP or specialty clinic as directed.  You can call [phone] or [phone] to schedule an appointment with the appropriate provider.  Schedule CENTER is open Mon-Friday 8-5pm (excluded holidays).    - If your condition worsens or fails to improve we recommend that you receive another evaluation at the emergency room immediately or contact your primary medical clinic to discuss your concerns.            Prevention steps for patients with confirmed or suspected COVID-19  Stay home and stay away from family members and friends. The CDC says, you can leave home after these three things have happened: 1) You have had no fever for at least 24 hours (that is one full day of no fever without the use of medicine that reduces fevers) 2) AND other symptoms have improved (for example, when your cough or shortness of breath have improved) 3) AND at least 10 days have passed since your symptoms first appeared OR after 7-10 days passed from first positive test.  Separate yourself from other people and animals in your home.  Call ahead before visiting your doctor.  Wear a facemask.  Cover your coughs and sneezes.  Wash your hands often with soap and water; hand   can be used, too.  Avoid sharing personal household items.  Wipe down surfaces used daily.  Monitor your symptoms. Seek prompt medical attention if your illness is worsening (e.g., difficulty breathing).   Before seeking care, call your healthcare provider.  If you have a medical emergency and need to call 911, notify the dispatch personnel that you have, or are being evaluated for COVID-19. If possible, put on a facemask before emergency medical services arrive.        Recommended precautions for household members, intimate partners, and caregivers in a home setting of a patient with symptomatic laboratory-confirmed COVID-19 or a patient under investigation.  Household members, intimate partners, and caregivers in the home setting awaiting tests results have close contact with a person with symptomatic, laboratory-confirmed COVID-19 or a person under investigation. Close contacts should monitor their health; they should call their provider right away if they develop symptoms suggestive of COVID-19 (e.g., fever, cough, shortness of breath).    Close contacts should also follow these recommendations:  Make sure that you understand and can help the patient follow their provider's instructions for medication(s) and care. You should help the patient with basic needs in the home and provide support for getting groceries, prescriptions, and other personal needs.  Monitor the patient's symptoms. If the patient is getting sicker, call his or her healthcare provider and tell them that the patient has laboratory-confirmed COVID-19. If the patient has a medical emergency and you need to call 911, notify the dispatch personnel that the patient has, or is being evaluated for COVID-19.  Household members should stay in another room or be  from the patient. Household members should use a separate bedroom and bathroom, if available.  Prohibit visitors.  Household members should care for any pets in the  home.  Make sure that shared spaces in the home have good air flow, such as by an air conditioner or an opened window, weather permitting.  Perform hand hygiene frequently. Wash your hands often with soap and water for at least 20 seconds or use an alcohol-based hand  (that contains > 60% alcohol) covering all surfaces of your hands and rubbing them together until they feel dry. Soap and water should be used preferentially.  Avoid touching your eyes, nose, and mouth.  The patient should wear a facemask. If the patient is not able to wear a facemask (for example, because it causes trouble breathing), caregivers should wear a mask when they are in the same room as the patient.  Wear a disposable facemask and gloves when you touch or have contact with the patient's blood, stool, or body fluids, such as saliva, sputum, nasal mucus, vomit, urine.  Throw out disposable facemasks and gloves after using them. Do not reuse.  When removing personal protective equipment, first remove and dispose of gloves. Then, immediately clean your hands with soap and water or alcohol-based hand . Next, remove and dispose of facemask, and immediately clean your hands again with soap and water or alcohol-based hand .  You should not share dishes, drinking glasses, cups, eating utensils, towels, bedding, or other items with the patient. After the patient uses these items, you should wash them thoroughly (see below Wash laundry thoroughly).  Clean all high-touch surfaces, such as counters, tabletops, doorknobs, bathroom fixtures, toilets, phones, keyboards, tablets, and bedside tables, every day. Also, clean any surfaces that may have blood, stool, or body fluids on them.  Use a household cleaning spray or wipe, according to the label instructions. Labels contain instructions for safe and effective use of the cleaning product including precautions you should take when applying the product, such as wearing gloves  and making sure you have good ventilation during use of the product.  Wash laundry thoroughly.  Immediately remove and wash clothes or bedding that have blood, stool, or body fluids on them.  Wear disposable gloves while handling soiled items and keep soiled items away from your body. Clean your hands (with soap and water or an alcohol-based hand ) immediately after removing your gloves.  Read and follow directions on labels of laundry or clothing items and detergent. In general, using a normal laundry detergent according to washing machine instructions and dry thoroughly using the warmest temperatures recommended on the clothing label.  Place all used disposable gloves, facemasks, and other contaminated items in a lined container before disposing of them with other household waste. Clean your hands (with soap and water or an alcohol-based hand ) immediately after handling these items. Soap and water should be used preferentially if hands are visibly dirty.  Discuss any additional questions with your state or local health department or healthcare provider. Check available hours when contacting your local health department.    For more information see CDC link below.      https://www.cdc.gov/coronavirus/2019-ncov/hcp/guidance-prevent-spread.html#precautions        Sources:  [facility], [place] [facility]          Instructions for Home Care of Patients and Caretakers with Coronavirus Disease 2019  Limit visitors to the home.  Older persons and those that have chronic medical conditions such as diabetes, lung and heart disease are at increased risk for illness.   If possible, patients should use a separate bedroom while recovering. Caregivers and household members should avoid prolonged contact with the patient which means to stay 6 feet away and avoid contact with cough droplets.  When close contact is necessary, wash your hands before and immediately after contact.   Perform hand  hygiene frequently. Wash your hands often with soap and water for at least 20 seconds or use an alcohol-based hand , covering all surfaces of your hands and rubbing them together until they feel dry.   Avoid touching your eyes, nose, and mouth with unwashed hands.  Avoid sharing household items with the patient. You should not share dishes, drinking glasses, cups, eating utensils, towels, bedding, or other items. After the patient uses these items, you should wash them thoroughly.  Wash laundry thoroughly.   Immediately remove and wash clothes or bedding that have blood, stool, or body fluids on them.  Clean all high-touch surfaces, such as counters, tabletops, doorknobs, bathroom fixtures, toilets, phones, keyboards, tablets, and bedside tables, every day.   Use a household cleaning spray or wipe, according to the label instructions. Labels contain instructions for safe and effective use of the cleaning product including precautions you should take when applying the product, such as wearing gloves and making sure you have good ventilation during use of the product.    For more information see CDC link below.      https://www.cdc.gov/coronavirus/2019-ncov/hcp/guidance-prevent-spread.html#precautions               If your symptoms worsen or if you have any other concerns, please contact [facility] at [phone].

## 2022-04-29 NOTE — TELEPHONE ENCOUNTER
They were inquiring about covid preventive med .  They started with cystomes on the 4/19 and came in to see someone but was not tested for covid treated for URI infection.  Decide to go to urgent care because he was not getting better .

## 2022-04-29 NOTE — TELEPHONE ENCOUNTER
----- Message from Adebayo Lindsay sent at 4/29/2022  2:51 PM CDT -----  Contact: wife  Pt tested positive for Covid and they are calling to discuss a medication that was prescribed by urgent care they are wanting to make sure that it is ok to take with his other medications.Please advise

## 2022-05-01 ENCOUNTER — NURSE TRIAGE (OUTPATIENT)
Dept: ADMINISTRATIVE | Facility: CLINIC | Age: 87
End: 2022-05-01
Payer: COMMERCIAL

## 2022-05-01 NOTE — TELEPHONE ENCOUNTER
RAVINDERM escalation - NT called number and spoke with pt wife who reports she was calling for herself and not pt. This encounter closed as no contact and encounter opened in wife chart.     Reason for Disposition   Caller has cancelled the call before the first contact    Protocols used: NO CONTACT OR DUPLICATE CONTACT CALL-A-AH

## 2022-05-11 ENCOUNTER — OFFICE VISIT (OUTPATIENT)
Dept: PODIATRY | Facility: CLINIC | Age: 87
End: 2022-05-11
Payer: COMMERCIAL

## 2022-05-11 VITALS
WEIGHT: 113.13 LBS | SYSTOLIC BLOOD PRESSURE: 115 MMHG | HEIGHT: 64 IN | BODY MASS INDEX: 19.31 KG/M2 | HEART RATE: 89 BPM | DIASTOLIC BLOOD PRESSURE: 73 MMHG

## 2022-05-11 DIAGNOSIS — B35.1 ONYCHOMYCOSIS DUE TO DERMATOPHYTE: Primary | ICD-10-CM

## 2022-05-11 PROCEDURE — 1126F PR PAIN SEVERITY QUANTIFIED, NO PAIN PRESENT: ICD-10-PCS | Mod: CPTII,S$GLB,, | Performed by: PODIATRIST

## 2022-05-11 PROCEDURE — 1157F PR ADVANCE CARE PLAN OR EQUIV PRESENT IN MEDICAL RECORD: ICD-10-PCS | Mod: CPTII,S$GLB,, | Performed by: PODIATRIST

## 2022-05-11 PROCEDURE — 99999 PR PBB SHADOW E&M-EST. PATIENT-LVL III: CPT | Mod: PBBFAC,,, | Performed by: PODIATRIST

## 2022-05-11 PROCEDURE — 99999 PR PBB SHADOW E&M-EST. PATIENT-LVL III: ICD-10-PCS | Mod: PBBFAC,,, | Performed by: PODIATRIST

## 2022-05-11 PROCEDURE — 99213 OFFICE O/P EST LOW 20 MIN: CPT | Mod: S$GLB,,, | Performed by: PODIATRIST

## 2022-05-11 PROCEDURE — 1159F MED LIST DOCD IN RCRD: CPT | Mod: CPTII,S$GLB,, | Performed by: PODIATRIST

## 2022-05-11 PROCEDURE — 1157F ADVNC CARE PLAN IN RCRD: CPT | Mod: CPTII,S$GLB,, | Performed by: PODIATRIST

## 2022-05-11 PROCEDURE — 1126F AMNT PAIN NOTED NONE PRSNT: CPT | Mod: CPTII,S$GLB,, | Performed by: PODIATRIST

## 2022-05-11 PROCEDURE — 99213 PR OFFICE/OUTPT VISIT, EST, LEVL III, 20-29 MIN: ICD-10-PCS | Mod: S$GLB,,, | Performed by: PODIATRIST

## 2022-05-11 PROCEDURE — 1159F PR MEDICATION LIST DOCUMENTED IN MEDICAL RECORD: ICD-10-PCS | Mod: CPTII,S$GLB,, | Performed by: PODIATRIST

## 2022-05-11 NOTE — PROGRESS NOTES
Subjective:      Patient ID: Brent Alatorre is a 89 y.o. male.    Chief Complaint:   Nail Care    Brent is a 89 y.o. male who returns to the clinic f/u fungal nails left big toe.   Corona he went to the wrong location.  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 He discusses his 89th upcoming birthday      Pt over 30 min late for his appt. Still seen *  Past Medical History:   Diagnosis Date    Anemia     Bladder cancer     Cancer     bladder    Cataracts, bilateral     Colon polyp     Glaucoma suspect of both eyes     History of kidney problems     Hypertension     Primary osteoarthritis of both hands 11/29/2017     Past Surgical History:   Procedure Laterality Date    BLADDER AUGMENTATION  1995    bladder cancer removal and colon resection prostatectomy     BLADDER SURGERY      cystectomy    COLON SURGERY  1995    CYSTOSCOPY      HERNIA REPAIR      PROSTATE SURGERY  1995     Current Outpatient Medications on File Prior to Visit   Medication Sig Dispense Refill    acetaminophen (TYLENOL) 500 MG tablet Take 500 mg by mouth as needed.      amLODIPine (NORVASC) 2.5 MG tablet Take 1 tablet (2.5 mg total) by mouth once daily. 90 tablet 3    ciclopirox (PENLAC) 8 % Soln Apply topically nightly. Remove every 7 days with nail polish remover 1 Bottle 0    diclofenac sodium (VOLTAREN) 1 % Gel Apply 2 g topically 3 (three) times daily. 200 g 11    ergocalciferol (ERGOCALCIFEROL) 50,000 unit Cap Take 50,000 Units by mouth every 7 days.      ferrous sulfate 325 (65 FE) MG EC tablet Take by mouth.      lisinopriL (PRINIVIL,ZESTRIL) 5 MG tablet Take 1 tablet (5 mg total) by mouth once daily. 90 tablet 1    multivitamin capsule Take 1 capsule by mouth once daily.      sodium bicarbonate 650 MG tablet Take 1 tablet (650 mg total) by mouth 2 (two) times daily. 180  "tablet 3    solifenacin (VESICARE) 10 MG tablet TAKE 1 TABLET(10 MG) BY MOUTH EVERY DAY 90 tablet 3     No current facility-administered medications on file prior to visit.     Review of patient's allergies indicates:   Allergen Reactions    Sulfa (sulfonamide antibiotics)      Sulfur makes pt sick.       Review of Systems   Constitutional: Negative for chills, decreased appetite, fever, malaise/fatigue, night sweats, weight gain and weight loss.   HENT: Positive for hearing loss.    Cardiovascular: Negative for chest pain, claudication, dyspnea on exertion, leg swelling, palpitations and syncope.   Respiratory: Negative for cough and shortness of breath.    Endocrine: Negative for cold intolerance and heat intolerance.   Hematologic/Lymphatic: Negative for bleeding problem. Does not bruise/bleed easily.   Skin: Positive for nail changes. Negative for color change, dry skin, flushing, itching, poor wound healing, rash, skin cancer, suspicious lesions and unusual hair distribution.   Musculoskeletal: Negative for arthritis, back pain, falls, gout, joint pain, joint swelling, muscle cramps, muscle weakness, myalgias, neck pain and stiffness.   Gastrointestinal: Negative for diarrhea, nausea and vomiting.   Neurological: Negative for dizziness, focal weakness, light-headedness, numbness, paresthesias, tremors, vertigo and weakness.   Psychiatric/Behavioral: Negative for altered mental status and depression. The patient does not have insomnia.    Allergic/Immunologic: Negative.            Objective:       Vitals:    05/11/22 1543   BP: 115/73   Pulse: 89   Weight: 51.3 kg (113 lb 1.5 oz)   Height: 5' 4" (1.626 m)   PainSc: 0-No pain   51.3 kg (113 lb 1.5 oz)     Physical Exam  Vitals reviewed.   Constitutional:       General: He is not in acute distress.     Appearance: He is well-developed. He is not ill-appearing, toxic-appearing or diaphoretic.      Comments: Proper supportive shoegear   Cardiovascular:      " Pulses:           Dorsalis pedis pulses are 2+ on the right side and 2+ on the left side.        Posterior tibial pulses are 1+ on the right side and 1+ on the left side.   Musculoskeletal:      Right lower leg: No edema.      Left lower leg: No edema.      Right foot: Decreased range of motion. Deformity and bunion present. No tenderness or bony tenderness.      Left foot: Decreased range of motion. Deformity, bunion and tenderness present. No bony tenderness.      Comments:   No pop to nail left except with debridement    No pain to digit with rom    Feet:      Right foot:      Protective Sensation: 10 sites tested. 10 sites sensed.      Skin integrity: No ulcer, blister, skin breakdown, erythema, warmth, callus or dry skin.      Toenail Condition: Right toenails are normal.      Left foot:      Protective Sensation: 10 sites tested. 10 sites sensed.      Skin integrity: No ulcer, blister, skin breakdown, erythema, warmth, callus or dry skin.      Toenail Condition: Fungal disease present.     Comments: SWM intact    Toenails 1 left is extremely thickened  Discolored/ , dystrophic, brittle with subungual debris.  Upon debridement mild maceration (improvement from last visit) no open wound or depth. All other nails thin and clear  Skin:     General: Skin is warm and dry.      Capillary Refill: Capillary refill takes 2 to 3 seconds.      Coloration: Skin is pale.      Findings: No erythema or rash.      Nails: There is no clubbing.   Neurological:      Mental Status: He is alert and oriented to person, place, and time.      Sensory: No sensory deficit.      Gait: Gait abnormal.   Psychiatric:         Attention and Perception: Attention normal.         Mood and Affect: Mood normal.         Behavior: Behavior normal.         Thought Content: Thought content normal.         Cognition and Memory: Memory is impaired.         Judgment: Judgment normal.      Comments: Hearing issues               Assessment:        Encounter Diagnosis   Name Primary?    Onychomycosis due to dermatophyte Yes         Plan:       Brent was seen today for nail care.    Diagnoses and all orders for this visit:    Onychomycosis due to dermatophyte      I counseled the patient on his conditions, their implications and medical management.      - With patient's permission, the toenail mentioned above x 1 reduced and debrided using a nail nipper, removing all offending nail and debris.  enitire nail was able to be removed. The patient will continue to monitor the areas daily, inspect the feet, wear protective shoe gear when ambulatory, and moisturizer to maintain skin integrity.    - applied gentian violet to improve maceration and nail bed    - overall improvement noted    - continue proper shoegear    - f/u 3 months to re-assess nail new growth  .

## 2022-06-25 ENCOUNTER — OFFICE VISIT (OUTPATIENT)
Dept: URGENT CARE | Facility: CLINIC | Age: 87
End: 2022-06-25
Payer: COMMERCIAL

## 2022-06-25 VITALS
WEIGHT: 113 LBS | RESPIRATION RATE: 16 BRPM | OXYGEN SATURATION: 96 % | DIASTOLIC BLOOD PRESSURE: 44 MMHG | HEART RATE: 84 BPM | HEIGHT: 64 IN | TEMPERATURE: 98 F | SYSTOLIC BLOOD PRESSURE: 93 MMHG | BODY MASS INDEX: 19.29 KG/M2

## 2022-06-25 DIAGNOSIS — L02.01 FACIAL ABSCESS: Primary | ICD-10-CM

## 2022-06-25 PROCEDURE — 99213 OFFICE O/P EST LOW 20 MIN: CPT | Mod: S$GLB,,, | Performed by: NURSE PRACTITIONER

## 2022-06-25 PROCEDURE — 1157F PR ADVANCE CARE PLAN OR EQUIV PRESENT IN MEDICAL RECORD: ICD-10-PCS | Mod: CPTII,S$GLB,, | Performed by: NURSE PRACTITIONER

## 2022-06-25 PROCEDURE — 1159F PR MEDICATION LIST DOCUMENTED IN MEDICAL RECORD: ICD-10-PCS | Mod: CPTII,S$GLB,, | Performed by: NURSE PRACTITIONER

## 2022-06-25 PROCEDURE — 99213 PR OFFICE/OUTPT VISIT, EST, LEVL III, 20-29 MIN: ICD-10-PCS | Mod: S$GLB,,, | Performed by: NURSE PRACTITIONER

## 2022-06-25 PROCEDURE — 1159F MED LIST DOCD IN RCRD: CPT | Mod: CPTII,S$GLB,, | Performed by: NURSE PRACTITIONER

## 2022-06-25 PROCEDURE — 1157F ADVNC CARE PLAN IN RCRD: CPT | Mod: CPTII,S$GLB,, | Performed by: NURSE PRACTITIONER

## 2022-06-25 RX ORDER — DOXYCYCLINE 100 MG/1
100 CAPSULE ORAL EVERY 12 HOURS
Qty: 14 CAPSULE | Refills: 0 | Status: SHIPPED | OUTPATIENT
Start: 2022-06-25 | End: 2022-07-02

## 2022-06-25 NOTE — PROGRESS NOTES
"Subjective:       Patient ID: Brent Alatorre is a 89 y.o. male.    Vitals:  height is 5' 4" (1.626 m) and weight is 51.3 kg (113 lb). His temperature is 98.1 °F (36.7 °C). His blood pressure is 93/44 (abnormal) and his pulse is 84. His respiration is 16 and oxygen saturation is 96%.     Chief Complaint: Rash (Rash on face)    This is a 89 y.o. male who presents today with a chief complaint of rash on face. Perhaps insect bite. Pt. Woke up with it this morning.    Rash  This is a new problem. The current episode started today. The problem is unchanged. The affected locations include the face. The rash is characterized by swelling, redness and pain. It is unknown if there was an exposure to a precipitant.       Skin: Positive for rash.       Objective:      Physical Exam   HENT:   Head: Normocephalic and atraumatic.       Ears:   Right Ear: External ear normal.   Left Ear: External ear normal.   Abdominal: Normal appearance.   Neurological: He is alert.   Nursing note and vitals reviewed.        Assessment:       1. Facial abscess          Plan:         Facial abscess  -     doxycycline (MONODOX) 100 MG capsule; Take 1 capsule (100 mg total) by mouth every 12 (twelve) hours. for 7 days  Dispense: 14 capsule; Refill: 0                  Patient Instructions   WARM COMPRESS TO AREA  MONITOR FOR FEVER    General Discharge Instructions   If you were prescribed a narcotic or controlled medication, do not drive or operate heavy equipment or machinery while taking these medications.  If you were prescribed antibiotics, please take them to completion.  You must understand that you've received an Urgent Care treatment only and that you may be released before all your medical problems are known or treated. You, the patient, will arrange for follow up care as instructed.  Follow up with your PCP or specialty clinic as directed in the next 1-2 weeks if not improved or as needed.  You can call [phone] to schedule an " appointment with the appropriate provider.  If your condition worsens we recommend that you receive another evaluation at the emergency room immediately or contact your primary medical clinics after hours call service to discuss your concerns.  Please return here or go to the Emergency Department for any concerns or worsening of condition.      WE CANNOT RULE OUT ALL POSSIBLE CAUSES OF YOUR SYMPTOMS IN THE URGENT CARE SETTING PLEASE GO TO THE ER IF YOU FEELS YOUR CONDITION IS WORSENING OR YOU WOULD LIKE EMERGENT EVALUATION.

## 2022-06-25 NOTE — PATIENT INSTRUCTIONS
WARM COMPRESS TO AREA  MONITOR FOR FEVER    General Discharge Instructions   If you were prescribed a narcotic or controlled medication, do not drive or operate heavy equipment or machinery while taking these medications.  If you were prescribed antibiotics, please take them to completion.  You must understand that you've received an Urgent Care treatment only and that you may be released before all your medical problems are known or treated. You, the patient, will arrange for follow up care as instructed.  Follow up with your PCP or specialty clinic as directed in the next 1-2 weeks if not improved or as needed.  You can call [phone] to schedule an appointment with the appropriate provider.  If your condition worsens we recommend that you receive another evaluation at the emergency room immediately or contact your primary medical clinics after hours call service to discuss your concerns.  Please return here or go to the Emergency Department for any concerns or worsening of condition.      WE CANNOT RULE OUT ALL POSSIBLE CAUSES OF YOUR SYMPTOMS IN THE URGENT CARE SETTING PLEASE GO TO THE ER IF YOU FEELS YOUR CONDITION IS WORSENING OR YOU WOULD LIKE EMERGENT EVALUATION.

## 2022-07-30 DIAGNOSIS — I10 ESSENTIAL HYPERTENSION: ICD-10-CM

## 2022-07-30 NOTE — TELEPHONE ENCOUNTER
No new care gaps identified.  [facility] Embedded Care Gaps. Reference number: [id number]. 7/30/2022   5:24:15 AM CDT

## 2022-07-31 NOTE — TELEPHONE ENCOUNTER
Refill Routing Note   Medication(s) are not appropriate for processing by [facility] for the following reason(s):      - Required vitals are abnormal    [facility] action(s):  Defer          Medication reconciliation completed: No     Appointments  past 12m or future 3m with PCP    Date Provider   Last Visit   12/16/2021 Lupe Lezama MD   Next Visit   7/30/2022 Lupe Lezama MD   ED visits in past 90 days: 0        Note composed:11:13 PM 07/30/2022

## 2022-08-02 RX ORDER — AMLODIPINE BESYLATE 2.5 MG/1
2.5 TABLET ORAL DAILY
Qty: 90 TABLET | Refills: 3 | Status: SHIPPED | OUTPATIENT
Start: 2022-08-02 | End: 2023-08-29 | Stop reason: SDUPTHER

## 2022-08-02 RX ORDER — AMLODIPINE BESYLATE 5 MG/1
TABLET ORAL
Qty: 90 TABLET | Refills: 3 | OUTPATIENT
Start: 2022-08-02

## 2022-08-22 ENCOUNTER — OFFICE VISIT (OUTPATIENT)
Dept: NEUROLOGY | Facility: CLINIC | Age: 87
End: 2022-08-22
Payer: COMMERCIAL

## 2022-08-22 VITALS
WEIGHT: 119 LBS | DIASTOLIC BLOOD PRESSURE: 66 MMHG | HEART RATE: 80 BPM | SYSTOLIC BLOOD PRESSURE: 115 MMHG | HEIGHT: 64 IN | BODY MASS INDEX: 20.32 KG/M2

## 2022-08-22 DIAGNOSIS — G31.84 MILD COGNITIVE IMPAIRMENT: Primary | ICD-10-CM

## 2022-08-22 PROCEDURE — 1157F PR ADVANCE CARE PLAN OR EQUIV PRESENT IN MEDICAL RECORD: ICD-10-PCS | Mod: CPTII,S$GLB,, | Performed by: PSYCHIATRY & NEUROLOGY

## 2022-08-22 PROCEDURE — 3288F PR FALLS RISK ASSESSMENT DOCUMENTED: ICD-10-PCS | Mod: CPTII,S$GLB,, | Performed by: PSYCHIATRY & NEUROLOGY

## 2022-08-22 PROCEDURE — 3288F FALL RISK ASSESSMENT DOCD: CPT | Mod: CPTII,S$GLB,, | Performed by: PSYCHIATRY & NEUROLOGY

## 2022-08-22 PROCEDURE — 1126F PR PAIN SEVERITY QUANTIFIED, NO PAIN PRESENT: ICD-10-PCS | Mod: CPTII,S$GLB,, | Performed by: PSYCHIATRY & NEUROLOGY

## 2022-08-22 PROCEDURE — 1159F PR MEDICATION LIST DOCUMENTED IN MEDICAL RECORD: ICD-10-PCS | Mod: CPTII,S$GLB,, | Performed by: PSYCHIATRY & NEUROLOGY

## 2022-08-22 PROCEDURE — 99999 PR PBB SHADOW E&M-EST. PATIENT-LVL IV: CPT | Mod: PBBFAC,,, | Performed by: PSYCHIATRY & NEUROLOGY

## 2022-08-22 PROCEDURE — 1101F PT FALLS ASSESS-DOCD LE1/YR: CPT | Mod: CPTII,S$GLB,, | Performed by: PSYCHIATRY & NEUROLOGY

## 2022-08-22 PROCEDURE — 1159F MED LIST DOCD IN RCRD: CPT | Mod: CPTII,S$GLB,, | Performed by: PSYCHIATRY & NEUROLOGY

## 2022-08-22 PROCEDURE — 1160F RVW MEDS BY RX/DR IN RCRD: CPT | Mod: CPTII,S$GLB,, | Performed by: PSYCHIATRY & NEUROLOGY

## 2022-08-22 PROCEDURE — 1126F AMNT PAIN NOTED NONE PRSNT: CPT | Mod: CPTII,S$GLB,, | Performed by: PSYCHIATRY & NEUROLOGY

## 2022-08-22 PROCEDURE — 99214 PR OFFICE/OUTPT VISIT, EST, LEVL IV, 30-39 MIN: ICD-10-PCS | Mod: S$GLB,,, | Performed by: PSYCHIATRY & NEUROLOGY

## 2022-08-22 PROCEDURE — 1157F ADVNC CARE PLAN IN RCRD: CPT | Mod: CPTII,S$GLB,, | Performed by: PSYCHIATRY & NEUROLOGY

## 2022-08-22 PROCEDURE — 99999 PR PBB SHADOW E&M-EST. PATIENT-LVL IV: ICD-10-PCS | Mod: PBBFAC,,, | Performed by: PSYCHIATRY & NEUROLOGY

## 2022-08-22 PROCEDURE — 1101F PR PT FALLS ASSESS DOC 0-1 FALLS W/OUT INJ PAST YR: ICD-10-PCS | Mod: CPTII,S$GLB,, | Performed by: PSYCHIATRY & NEUROLOGY

## 2022-08-22 PROCEDURE — 99214 OFFICE O/P EST MOD 30 MIN: CPT | Mod: S$GLB,,, | Performed by: PSYCHIATRY & NEUROLOGY

## 2022-08-22 PROCEDURE — 1160F PR REVIEW ALL MEDS BY PRESCRIBER/CLIN PHARMACIST DOCUMENTED: ICD-10-PCS | Mod: CPTII,S$GLB,, | Performed by: PSYCHIATRY & NEUROLOGY

## 2022-08-22 NOTE — PROGRESS NOTES
[facility] - NEUROLOGY [address]TH [address] [facility], [place]    Date: August 22, 2022   Patient Name: Brent Alatorre   MRN: [id number]   PCP: Lupe Lezama  Referring Provider: Kenyon Barnard MD    Assessment:      This is Brent Alatorre, 89 y.o. 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     Plan:      -  Will refrain from donepezil at this time.    Follow up 6-12 months    Greater than 30 minutes spent in chart review, documentation, independent review of imaging, and face to face time with patient       I discussed side effects of the medications. I asked the patient to stop the medication if he notices serious adverse effects as we discussed and to seek immediate medical attention at an ER.     Amarjit Alexander MD  [facility]   Department of Neurology    Subjective:     -  Presents alone as wife is recovering from surgery, doing very well, continues to enjoy chess, playing music, regular trips to the gym  -  Notes some short term memory difficulty for recent conversation, uses hearing aid inconsistently    2/2022  -  Presents with wife who continues to note forgetfulness regarding recent events although remains overall high functioning, continues to play music and recently recorded an album, remains a proficient chess player, wife notes that increased forgetfulness tends to coincide with increased pandemic related isolation and decreased physical exercise.  8/2021  -  Presents with wife who notes that he will often forget things she has told him earlier in the day, does note hearing difficulty with upcoming hearing aid evaluation  -  Continues to play music and remains a competitive chess player (recently on the cover of [employer])  -  Has not started donepezil yet  -  Does continue daily MJ use       HPI 8/2020:   Mr. Brent Alatorre is a 89 y.o. male who presents with a chief complaint of memory, presents  alone and provides his own history    Patient reports he does not appreciate significant memory difficulty although his wife feels he has difficulty with short term memory for things she has recently asked him to do.  He also describes instances in which he will enter a room and have a delay in recalling why he entered.  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  Prior to the pandemic he enjoyed going swimming on a regular basis and playing chess with friends.  He continues to drive without difficulty, wife manages finances as she always has.  No issues with sleep or mood.  Does not drink but does have occasional marijuana use.    PAST MEDICAL HISTORY:  Past Medical History:   Diagnosis Date    Anemia     Bladder cancer     Cancer     bladder    Cataracts, bilateral     Colon polyp     Glaucoma suspect of both eyes     History of kidney problems     Hypertension     Primary osteoarthritis of both hands 11/29/2017       PAST SURGICAL HISTORY:  Past Surgical History:   Procedure Laterality Date    BLADDER AUGMENTATION  1995    bladder cancer removal and colon resection prostatectomy     BLADDER SURGERY      cystectomy    COLON SURGERY  1995    CYSTOSCOPY      HERNIA REPAIR      PROSTATE SURGERY  1995       CURRENT MEDS:  Current Outpatient Medications   Medication Sig Dispense Refill    acetaminophen (TYLENOL) 500 MG tablet Take 500 mg by mouth as needed.      amLODIPine (NORVASC) 2.5 MG tablet Take 1 tablet (2.5 mg total) by mouth once daily. 90 tablet 3    ciclopirox (PENLAC) 8 % Soln Apply topically nightly. Remove every 7 days with nail polish remover 1 Bottle 0    diclofenac sodium (VOLTAREN) 1 % Gel Apply 2 g topically 3 (three) times daily. 200 g 11    ergocalciferol (ERGOCALCIFEROL) 50,000 unit Cap Take 50,000 Units by mouth every 7 days.      ferrous sulfate 325 (65 FE) MG EC tablet Take by mouth.      lisinopriL  "(PRINIVIL,ZESTRIL) 5 MG tablet TAKE 1 TABLET(5 MG) BY MOUTH EVERY DAY 90 tablet 1    multivitamin capsule Take 1 capsule by mouth once daily.      sodium bicarbonate 650 MG tablet Take 1 tablet (650 mg total) by mouth 2 (two) times daily. 180 tablet 3    solifenacin (VESICARE) 10 MG tablet TAKE 1 TABLET(10 MG) BY MOUTH EVERY DAY 90 tablet 3     No current facility-administered medications for this visit.       ALLERGIES:  Review of patient's allergies indicates:   Allergen Reactions    Sulfa (sulfonamide antibiotics)      Sulfur makes pt sick.       FAMILY HISTORY:  Family History   Problem Relation Age of Onset    Diabetes Mother     Cancer Father         prostate cancer    Cancer Brother         stomach cancer     Cancer Brother         lung cancer    Heart disease Neg Hx     Colon polyps Neg Hx     Amblyopia Neg Hx     Blindness Neg Hx     Cataracts Neg Hx     Glaucoma Neg Hx     Macular degeneration Neg Hx     Retinal detachment Neg Hx     Strabismus Neg Hx        SOCIAL HISTORY:  Social History     Tobacco Use    Smoking status: Never Smoker    Smokeless tobacco: Never Used    Tobacco comment: does marijuana   Substance Use Topics    Alcohol use: Yes     Alcohol/week: 3.0 standard drinks     Types: 3 Cans of beer per week     Comment: very light drinker    Drug use: Yes     Frequency: 4.0 times per week     Types: Marijuana     Comment: Every day       Review of Systems:  12 review of systems is negative except for the symptoms mentioned in HPI.        Objective:     Vitals:    08/22/22 1023   BP: 115/66   Pulse: 80   Weight: 54 kg (119 lb)   Height: 5' 4" (1.626 m)       General: NAD, well nourished   Eyes: no tearing, discharge, no erythema   ENT: moist mucous membranes of the oral cavity, nares patent    Neck: Supple, full range of motion  Cardiovascular: Warm and well perfused, pulses equal and symmetrical  Lungs: Normal work of breathing, normal chest wall excursions  Skin: No rash, " lesions, or breakdown on exposed skin  Psychiatry: Mood and affect are appropriate   Abdomen: soft, non tender, non distended  Extremeties: No cyanosis, clubbing or edema.    Neurological   MENTAL STATUS: Alert and appropriately conversant, provides his own history in good detail  CRANIAL NERVES: Visual fields intact. PERRL. EOMI. Facial sensation intact. Face symmetrical. Hearing mildly impaired. Full shoulder shrug bilaterally. Tongue protrudes midline   SENSORY: Sensation is intact to light touch throughout.   Negative Romberg.   MOTOR: Normal bulk and tone. No pronator drift.      CEREBELLAR/COORDINATION/GAIT: Gait steady with normal arm swing and stride length.

## 2022-10-04 ENCOUNTER — OFFICE VISIT (OUTPATIENT)
Dept: URGENT CARE | Facility: CLINIC | Age: 87
End: 2022-10-04
Payer: COMMERCIAL

## 2022-10-04 VITALS
SYSTOLIC BLOOD PRESSURE: 127 MMHG | WEIGHT: 125 LBS | HEART RATE: 68 BPM | DIASTOLIC BLOOD PRESSURE: 75 MMHG | OXYGEN SATURATION: 98 % | HEIGHT: 62 IN | RESPIRATION RATE: 20 BRPM | TEMPERATURE: 98 F | BODY MASS INDEX: 23 KG/M2

## 2022-10-04 DIAGNOSIS — R07.9 TRAUMATIC CHEST PAIN: Primary | ICD-10-CM

## 2022-10-04 DIAGNOSIS — S01.81XA FACIAL LACERATION, INITIAL ENCOUNTER: ICD-10-CM

## 2022-10-04 PROCEDURE — 1125F PR PAIN SEVERITY QUANTIFIED, PAIN PRESENT: ICD-10-PCS | Mod: CPTII,S$GLB,, | Performed by: FAMILY MEDICINE

## 2022-10-04 PROCEDURE — 1157F PR ADVANCE CARE PLAN OR EQUIV PRESENT IN MEDICAL RECORD: ICD-10-PCS | Mod: CPTII,S$GLB,, | Performed by: FAMILY MEDICINE

## 2022-10-04 PROCEDURE — 1160F PR REVIEW ALL MEDS BY PRESCRIBER/CLIN PHARMACIST DOCUMENTED: ICD-10-PCS | Mod: CPTII,S$GLB,, | Performed by: FAMILY MEDICINE

## 2022-10-04 PROCEDURE — 71101 XR RIBS MIN 3 VIEWS W/ PA CHEST RIGHT: ICD-10-PCS | Mod: FY,RT,S$GLB, | Performed by: RADIOLOGY

## 2022-10-04 PROCEDURE — 1125F AMNT PAIN NOTED PAIN PRSNT: CPT | Mod: CPTII,S$GLB,, | Performed by: FAMILY MEDICINE

## 2022-10-04 PROCEDURE — 1160F RVW MEDS BY RX/DR IN RCRD: CPT | Mod: CPTII,S$GLB,, | Performed by: FAMILY MEDICINE

## 2022-10-04 PROCEDURE — 71101 X-RAY EXAM UNILAT RIBS/CHEST: CPT | Mod: FY,RT,S$GLB, | Performed by: RADIOLOGY

## 2022-10-04 PROCEDURE — 99213 OFFICE O/P EST LOW 20 MIN: CPT | Mod: S$GLB,,, | Performed by: FAMILY MEDICINE

## 2022-10-04 PROCEDURE — 99213 PR OFFICE/OUTPT VISIT, EST, LEVL III, 20-29 MIN: ICD-10-PCS | Mod: S$GLB,,, | Performed by: FAMILY MEDICINE

## 2022-10-04 PROCEDURE — 1159F MED LIST DOCD IN RCRD: CPT | Mod: CPTII,S$GLB,, | Performed by: FAMILY MEDICINE

## 2022-10-04 PROCEDURE — 1157F ADVNC CARE PLAN IN RCRD: CPT | Mod: CPTII,S$GLB,, | Performed by: FAMILY MEDICINE

## 2022-10-04 PROCEDURE — 1159F PR MEDICATION LIST DOCUMENTED IN MEDICAL RECORD: ICD-10-PCS | Mod: CPTII,S$GLB,, | Performed by: FAMILY MEDICINE

## 2022-10-04 RX ORDER — CEPHALEXIN 500 MG/1
500 CAPSULE ORAL EVERY 12 HOURS
Qty: 10 CAPSULE | Refills: 0 | Status: SHIPPED | OUTPATIENT
Start: 2022-10-04 | End: 2022-10-09

## 2022-10-04 NOTE — PROGRESS NOTES
"Subjective:       Patient ID: Brent Alatorre is a 89 y.o. male.    Vitals:  height is 5' 2" (1.575 m) and weight is 56.7 kg (125 lb). His oral temperature is 98.4 °F (36.9 °C). His blood pressure is 127/75 and his pulse is 68. His respiration is 20 and oxygen saturation is 98%.     Chief Complaint: Facial Laceration    This is a 89 y.o. male who presents today with a chief complaint of right side of chest, face laceration, and right hand laceration, from a fall that happen today while playing with his grand kids. Up to date on Tetanus vaccine.      Laceration   The incident occurred 1 to 3 hours ago. The laceration is located on the Face. The laceration is 5 cm in size. The pain is at a severity of 0/10. The patient is experiencing no pain. The pain has been Constant since onset. He reports no foreign bodies present.     Constitution: Negative for activity change, appetite change, chills, fatigue, fever and generalized weakness.   HENT:  Negative for ear discharge, facial swelling, sinus pressure, sore throat, trouble swallowing and voice change.    Neck: Negative for neck stiffness and painful lymph nodes.   Cardiovascular:  Negative for chest pain, leg swelling, palpitations and sob on exertion.   Eyes:  Negative for vision loss.   Respiratory:  Negative for chest tightness, cough and shortness of breath.    Gastrointestinal:  Negative for nausea and vomiting.   Genitourinary:  Negative for dysuria.   Skin:  Positive for laceration. Negative for rash.   Neurological:  Negative for passing out, disorientation, altered mental status and numbness.   Hematologic/Lymphatic: Negative for swollen lymph nodes.   Psychiatric/Behavioral:  Negative for altered mental status, disorientation and confusion.      Objective:      Vitals:    10/04/22 1357   BP: 127/75   Pulse: 68   Resp: 20   Temp: 98.4 °F (36.9 °C)   TempSrc: Oral   SpO2: 98%   Weight: 56.7 kg (125 lb)   Height: 5' 2" (1.575 m)      Physical Exam "   Constitutional: He is oriented to person, place, and time.  Non-toxic appearance. He does not appear ill. No distress.   HENT:   Head:      Comments: Laceration below right eyebrow  Palmar abrasion  Eyes: Conjunctivae are normal.   Neck: Neck supple.   Cardiovascular: Normal rate, regular rhythm, normal heart sounds and normal pulses.   Pulmonary/Chest: Effort normal and breath sounds normal. He exhibits no tenderness.   Abdominal: Bowel sounds are normal. Soft.   Neurological: He is alert and oriented to person, place, and time.   Skin: Skin is not diaphoretic.   Psychiatric: Judgment and thought content normal.             XR RIB RIGHT W/ PA CHEST    Result Date: 10/4/2022  EXAMINATION: XR RIBS MIN 3 VIEWS W/ PA CHEST RIGHT CLINICAL HISTORY: Pain, unspecified TECHNIQUE: Chest PA view and right ribs COMPARISON: None FINDINGS: Heart size is normal.  The lungs are clear.  No pleural effusion. No definite acute rib fractures identified     See above Electronically signed by: Yao Glynn MD Date:    10/04/2022 Time:    14:28    Assessment:       1. Traumatic chest pain    2. Facial laceration, initial encounter          Plan:         Traumatic chest Pain  -     XR RIB RIGHT W/ PA CHEST; Future; Expected date: 10/04/2022    2. Facial laceration, initial encounter  Steri-strips and tissue adhesive applied  -     cephALEXin (KEFLEX) 500 MG capsule; Take 1 capsule (500 mg total) by mouth every 12 (twelve) hours. for 5 days  Dispense: 10 capsule; Refill: 0    Patient Instructions   Take antibiotic, Keflex, to prevent infection  Avoid alcohol based products, water to steri strips and glue to the face for 5-7 days.  You do not have any rib fracture on X-ray.  Ok to take tylenol if needed for pain.  You are up to date on tetanus vaccine.    Follow up with primary care provider if needed. You may return to urgent care as needed.    Seek immediate care in the emergency room in the event of severe headache, chest pain,  respiratory distress, fever unresponsive to antipyretic, dehydration, loss of consciousness, seizure.

## 2022-10-04 NOTE — PATIENT INSTRUCTIONS
Take antibiotic, Keflex, to prevent infection  Avoid alcohol based products, water to steri strips and glue to the face for 5-7 days.  You do not have any rib fracture on X-ray.  Ok to take tylenol if needed for pain.  You are up to date on tetanus vaccine.    Follow up with primary care provider if needed. You may return to urgent care as needed.    Seek immediate care in the emergency room in the event of severe headache, chest pain, respiratory distress, fever unresponsive to antipyretic, dehydration, loss of consciousness, seizure.

## 2022-10-26 ENCOUNTER — OFFICE VISIT (OUTPATIENT)
Dept: INTERNAL MEDICINE | Facility: CLINIC | Age: 87
End: 2022-10-26
Payer: COMMERCIAL

## 2022-10-26 VITALS
WEIGHT: 121.94 LBS | BODY MASS INDEX: 20.82 KG/M2 | SYSTOLIC BLOOD PRESSURE: 148 MMHG | HEIGHT: 64 IN | DIASTOLIC BLOOD PRESSURE: 68 MMHG
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
## 2022-10-26 DIAGNOSIS — R22.31 FINGER MASS, RIGHT: ICD-10-CM

## 2022-10-26 PROCEDURE — 1159F MED LIST DOCD IN RCRD: CPT | Mod: CPTII,S$GLB,, | Performed by: NURSE PRACTITIONER

## 2022-10-26 PROCEDURE — 1157F PR ADVANCE CARE PLAN OR EQUIV PRESENT IN MEDICAL RECORD: ICD-10-PCS | Mod: CPTII,S$GLB,, | Performed by: NURSE PRACTITIONER

## 2022-10-26 PROCEDURE — 90694 FLU VACCINE - QUADRIVALENT - ADJUVANTED: ICD-10-PCS | Mod: S$GLB,,, | Performed by: NURSE PRACTITIONER

## 2022-10-26 PROCEDURE — 90471 IMMUNIZATION ADMIN: CPT | Mod: S$GLB,,, | Performed by: NURSE PRACTITIONER

## 2022-10-26 PROCEDURE — 90694 VACC AIIV4 NO PRSRV 0.5ML IM: CPT | Mod: S$GLB,,, | Performed by: NURSE PRACTITIONER

## 2022-10-26 PROCEDURE — 99999 PR PBB SHADOW E&M-EST. PATIENT-LVL IV: ICD-10-PCS | Mod: PBBFAC,,, | Performed by: NURSE PRACTITIONER

## 2022-10-26 PROCEDURE — 99213 PR OFFICE/OUTPT VISIT, EST, LEVL III, 20-29 MIN: ICD-10-PCS | Mod: 25,S$GLB,, | Performed by: NURSE PRACTITIONER

## 2022-10-26 PROCEDURE — 90471 FLU VACCINE - QUADRIVALENT - ADJUVANTED: ICD-10-PCS | Mod: S$GLB,,, | Performed by: NURSE PRACTITIONER

## 2022-10-26 PROCEDURE — 1160F RVW MEDS BY RX/DR IN RCRD: CPT | Mod: CPTII,S$GLB,, | Performed by: NURSE PRACTITIONER

## 2022-10-26 PROCEDURE — 99213 OFFICE O/P EST LOW 20 MIN: CPT | Mod: 25,S$GLB,, | Performed by: NURSE PRACTITIONER

## 2022-10-26 PROCEDURE — 1160F PR REVIEW ALL MEDS BY PRESCRIBER/CLIN PHARMACIST DOCUMENTED: ICD-10-PCS | Mod: CPTII,S$GLB,, | Performed by: NURSE PRACTITIONER

## 2022-10-26 PROCEDURE — 99999 PR PBB SHADOW E&M-EST. PATIENT-LVL IV: CPT | Mod: PBBFAC,,, | Performed by: NURSE PRACTITIONER

## 2022-10-26 PROCEDURE — 1159F PR MEDICATION LIST DOCUMENTED IN MEDICAL RECORD: ICD-10-PCS | Mod: CPTII,S$GLB,, | Performed by: NURSE PRACTITIONER

## 2022-10-26 PROCEDURE — 1157F ADVNC CARE PLAN IN RCRD: CPT | Mod: CPTII,S$GLB,, | Performed by: NURSE PRACTITIONER

## 2022-10-26 NOTE — PROGRESS NOTES
Subjective:       Patient ID: Brent Alatorre is a 89 y.o. male.    Chief Complaint: Fall and Facial Injury      Patient is a 89 y.o. male who traditionally follows with Lupe Lezama MD presenting today for:    Facial Pain  Patient fell on 10/4/22 while playing with his grandchildren and injured the right side of his face. He was seen in  for a laceration above his right eye that was steri-stripped. He completed the prescribed antibiotics. Denies LOC, headache and vision changes both during the fall and following the fall. Pain is intermittent. Worse with palpation of the area. Described as sharp and lasting minutes.     Review of patient's allergies indicates:   Allergen Reactions    Sulfa (sulfonamide antibiotics)      Sulfur makes pt sick.       Medication List with Changes/Refills   Current Medications    ACETAMINOPHEN (TYLENOL) 500 MG TABLET    Take 500 mg by mouth as needed.    AMLODIPINE (NORVASC) 2.5 MG TABLET    Take 1 tablet (2.5 mg total) by mouth once daily.    CICLOPIROX (PENLAC) 8 % SOLN    Apply topically nightly. Remove every 7 days with nail polish remover    DICLOFENAC SODIUM (VOLTAREN) 1 % GEL    Apply 2 g topically 3 (three) times daily.    ERGOCALCIFEROL (ERGOCALCIFEROL) 50,000 UNIT CAP    Take 50,000 Units by mouth every 7 days.    FERROUS SULFATE 325 (65 FE) MG EC TABLET    Take by mouth.    LISINOPRIL (PRINIVIL,ZESTRIL) 5 MG TABLET    TAKE 1 TABLET(5 MG) BY MOUTH EVERY DAY    MULTIVITAMIN CAPSULE    Take 1 capsule by mouth once daily.    SODIUM BICARBONATE 650 MG TABLET    Take 1 tablet (650 mg total) by mouth 2 (two) times daily.    SOLIFENACIN (VESICARE) 10 MG TABLET    TAKE 1 TABLET(10 MG) BY MOUTH EVERY DAY       Health Maintenance    Flu Vaccine: 10/26/2022     Medical, social and surgical history has been reviewed with the patient.      Review of Systems   Constitutional:  Negative for chills and fever.   HENT:          Face/Eye Pain   Eyes:  Negative for blurred vision.  "  Musculoskeletal:  Positive for falls.        Nodule to finger   Neurological:  Negative for dizziness, loss of consciousness and headaches.     Objective:   BP (!) 148/68 (BP Location: Right arm, Patient Position: Sitting, BP Method: Medium (Manual))   Ht 5' 4" (1.626 m)   Wt 55.3 kg (121 lb 14.6 oz)   BMI 20.93 kg/m²     Physical Exam  Vitals reviewed.   Constitutional:       Appearance: Normal appearance.   HENT:      Head: No right periorbital erythema.        Comments: Swelling/nodule over right brow - laceration has healed.  Tenderness to palpation along temple without erythema, warmth, no pulsation noted  Eyes:      General: Lids are everted, no foreign bodies appreciated.         Right eye: No hordeolum.      Extraocular Movements: Extraocular movements intact.      Conjunctiva/sclera: Conjunctivae normal.   Pulmonary:      Effort: Pulmonary effort is normal.   Musculoskeletal:      Right hand: Tenderness present.      Comments: Middle finger - right hand - PIP joint nodules   Skin:     General: Skin is warm and dry.   Neurological:      Mental Status: He is alert and oriented to person, place, and time.       Last Labs:  Glucose   Date Value Ref Range Status   12/09/2021 83 70 - 110 mg/dL Final   01/17/2020 84 70 - 110 mg/dL Final     BUN   Date Value Ref Range Status   12/09/2021 19 8 - 23 mg/dL Final   01/17/2020 25 (H) 8 - 23 mg/dL Final     Creatinine   Date Value Ref Range Status   12/09/2021 1.1 0.5 - 1.4 mg/dL Final   01/17/2020 1.3 0.5 - 1.4 mg/dL Final     Cholesterol   Date Value Ref Range Status   12/09/2021 168 120 - 199 mg/dL Final     Comment:     The National Cholesterol Education Program (NCEP) has set the  following guidelines (reference ranges) for Cholesterol:  Optimal.....................<200 mg/dL  Borderline High.............200-239 mg/dL  High........................> or = 240 mg/dL     06/19/2019 151 120 - 199 mg/dL Final     Comment:     The National Cholesterol Education " Program (NCEP) has set the  following guidelines (reference ranges) for Cholesterol:  Optimal.....................<200 mg/dL  Borderline High.............200-239 mg/dL  High........................> or = 240 mg/dL       Hemoglobin A1C   Date Value Ref Range Status   06/19/2018 5.6 4.0 - 5.6 % Final     Comment:     ADA Screening Guidelines:  5.7-6.4%  Consistent with prediabetes  >or=6.5%  Consistent with diabetes  High levels of fetal hemoglobin interfere with the HbA1C  assay. Heterozygous hemoglobin variants (HbS, HgC, etc)do  not significantly interfere with this assay.   However, presence of multiple variants may affect accuracy.       Hemoglobin   Date Value Ref Range Status   12/09/2021 11.8 (L) 14.0 - 18.0 g/dL Final   10/08/2019 11.0 (L) 14.0 - 18.0 g/dL Final     Hematocrit   Date Value Ref Range Status   12/09/2021 38.3 (L) 40.0 - 54.0 % Final   10/08/2019 35.6 (L) 40.0 - 54.0 % Final       I have reviewed the following:     Details / Date    [x]   Labs     []   Micro     []   Pathology     []   Imaging     []   Cardiology Procedures     []   Other        Assessment and Plan:     1. Fall, subsequent encounter    Facial pain persisting - rule out fracture. To use Tylenol for pain relief. Apply ice/heat to help with swelling and pain. Follow up if no improved in the next week.   ;  - X-Ray Facial Bones  3 Or More View; Future    2. Finger mass, right    Patient evaluated on [facility] - suspected Mucoid Cyst. Now painful - patient requests evaluation.     - Ambulatory referral/consult to Orthopedics; Future

## 2022-11-07 ENCOUNTER — HOSPITAL ENCOUNTER (OUTPATIENT)
Dept: RADIOLOGY | Facility: HOSPITAL | Age: 87
Discharge: HOME | End: 2022-11-07
Attending: NURSE PRACTITIONER
Payer: COMMERCIAL

## 2022-11-07 ENCOUNTER — OFFICE VISIT (OUTPATIENT)
Dept: INTERNAL MEDICINE | Facility: CLINIC | Age: 87
End: 2022-11-07
Payer: COMMERCIAL

## 2022-11-07 VITALS
BODY MASS INDEX: 19.64 KG/M2 | SYSTOLIC BLOOD PRESSURE: 130 MMHG | OXYGEN SATURATION: 98 % | WEIGHT: 115.06 LBS | HEART RATE: 72 BPM | DIASTOLIC BLOOD PRESSURE: 74 MMHG | TEMPERATURE: 98 F | HEIGHT: 64 IN

## 2022-11-07 DIAGNOSIS — G31.84 MILD COGNITIVE IMPAIRMENT: ICD-10-CM

## 2022-11-07 DIAGNOSIS — N18.31 STAGE 3A CHRONIC KIDNEY DISEASE: Chronic | ICD-10-CM

## 2022-11-07 DIAGNOSIS — I10 ESSENTIAL HYPERTENSION: Primary | ICD-10-CM
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
## 2022-11-07 DIAGNOSIS — R10.32 LLQ PAIN: ICD-10-CM

## 2022-11-07 PROCEDURE — 99999 PR PBB SHADOW E&M-EST. PATIENT-LVL IV: CPT | Mod: PBBFAC,,, | Performed by: HOSPITALIST

## 2022-11-07 PROCEDURE — 99214 OFFICE O/P EST MOD 30 MIN: CPT | Mod: S$GLB,,, | Performed by: HOSPITALIST

## 2022-11-07 PROCEDURE — 1159F MED LIST DOCD IN RCRD: CPT | Mod: CPTII,S$GLB,, | Performed by: HOSPITALIST

## 2022-11-07 PROCEDURE — 1126F AMNT PAIN NOTED NONE PRSNT: CPT | Mod: CPTII,S$GLB,, | Performed by: HOSPITALIST

## 2022-11-07 PROCEDURE — 3288F FALL RISK ASSESSMENT DOCD: CPT | Mod: CPTII,S$GLB,, | Performed by: HOSPITALIST

## 2022-11-07 PROCEDURE — 3288F PR FALLS RISK ASSESSMENT DOCUMENTED: ICD-10-PCS | Mod: CPTII,S$GLB,, | Performed by: HOSPITALIST

## 2022-11-07 PROCEDURE — 99214 PR OFFICE/OUTPT VISIT, EST, LEVL IV, 30-39 MIN: ICD-10-PCS | Mod: S$GLB,,, | Performed by: HOSPITALIST

## 2022-11-07 PROCEDURE — 1101F PR PT FALLS ASSESS DOC 0-1 FALLS W/OUT INJ PAST YR: ICD-10-PCS | Mod: CPTII,S$GLB,, | Performed by: HOSPITALIST

## 2022-11-07 PROCEDURE — 1157F ADVNC CARE PLAN IN RCRD: CPT | Mod: CPTII,S$GLB,, | Performed by: HOSPITALIST

## 2022-11-07 PROCEDURE — 1157F PR ADVANCE CARE PLAN OR EQUIV PRESENT IN MEDICAL RECORD: ICD-10-PCS | Mod: CPTII,S$GLB,, | Performed by: HOSPITALIST

## 2022-11-07 PROCEDURE — 99999 PR PBB SHADOW E&M-EST. PATIENT-LVL IV: ICD-10-PCS | Mod: PBBFAC,,, | Performed by: HOSPITALIST

## 2022-11-07 PROCEDURE — 1159F PR MEDICATION LIST DOCUMENTED IN MEDICAL RECORD: ICD-10-PCS | Mod: CPTII,S$GLB,, | Performed by: HOSPITALIST

## 2022-11-07 PROCEDURE — 1160F RVW MEDS BY RX/DR IN RCRD: CPT | Mod: CPTII,S$GLB,, | Performed by: HOSPITALIST

## 2022-11-07 PROCEDURE — 1160F PR REVIEW ALL MEDS BY PRESCRIBER/CLIN PHARMACIST DOCUMENTED: ICD-10-PCS | Mod: CPTII,S$GLB,, | Performed by: HOSPITALIST

## 2022-11-07 PROCEDURE — 1101F PT FALLS ASSESS-DOCD LE1/YR: CPT | Mod: CPTII,S$GLB,, | Performed by: HOSPITALIST

## 2022-11-07 PROCEDURE — 1126F PR PAIN SEVERITY QUANTIFIED, NO PAIN PRESENT: ICD-10-PCS | Mod: CPTII,S$GLB,, | Performed by: HOSPITALIST

## 2022-11-07 NOTE — PROGRESS NOTES
Subjective:     @Patient ID: Brent Alatorre is a 89 y.o. male.    Chief Complaint: Hand Pain (Right middle finger has been bothering pt/)    HPI    89 yo male presents for f/u of chronic conditions, hand pain and recent fall.           1. Memory: recently evaluated by neurology. Suspect MCI vs normal aging. Will f/u in 6-12 months with neuro. No medications at this time.   2. HTN: currently on amlodipine 2.5 mg qday, lisinopril 5 mg qday   3. Decreased appetite: reports ongoing for past few years. Current wt 115 lb.  Wife reports will restart drinking ensures again   4. B/l hearing loss: Has hearing aids now.   5. CKD3: renal baseline. Had stopped taking sodium bicarb tabs . Has appt at [place] coming up for follow up.   6. Right hand 3rd finger: has upcoming appt on 12/7/22 with orthopedic surgery   7. Abdominal pain: reports still has episodes of LLQ. Reports with bending. When he rubs area with his hand it resolves in a few seconds. Had abdominal u/s that showed fatty liver. Still has intermittent episodes.   8. Leukopenia: had f/u visit at [place]. Seen by hematology for leukopenia in the past. No further workup per them at that time    9. S/p fall: reports few weeks ago was playing with grandchildren and fell. Had facial xray ordered by another provider. Would like to have xray done today     Review of Systems   Constitutional:  Negative for chills and fever.   HENT:  Negative for congestion and sore throat.    Eyes:  Negative for pain and visual disturbance.   Respiratory:  Negative for cough and shortness of breath.    Cardiovascular:  Negative for chest pain and leg swelling.   Gastrointestinal:  Negative for abdominal pain, nausea and vomiting.   Genitourinary:  Negative for difficulty urinating and dysuria.   Musculoskeletal:  Positive for arthralgias. Negative for back pain.   Skin:  Negative for rash and wound.   Neurological:  Negative for weakness and headaches.   Psychiatric/Behavioral:  Negative for  "agitation and confusion.    Past medical history, surgical history, and family medical history reviewed and updated as appropriate.    Medications and allergies reviewed.     Objective:     Vitals:    11/07/22 1401   BP: 130/74   BP Location: Left arm   Patient Position: Sitting   BP Method: Small (Manual)   Pulse: 72   Temp: 97.8 °F (36.6 °C)   TempSrc: Temporal   SpO2: 98%   Weight: 52.2 kg (115 lb 1.3 oz)   Height: 5' 4" (1.626 m)     Body mass index is 19.75 kg/m².  Physical Exam  Constitutional:       Appearance: Normal appearance.   HENT:      Head: Normocephalic and atraumatic.      Mouth/Throat:      Mouth: Mucous membranes are moist.      Pharynx: No oropharyngeal exudate.   Eyes:      General:         Right eye: No discharge.         Left eye: No discharge.      Conjunctiva/sclera: Conjunctivae normal.   Cardiovascular:      Rate and Rhythm: Normal rate and regular rhythm.   Pulmonary:      Effort: Pulmonary effort is normal.      Breath sounds: Normal breath sounds.   Abdominal:      General: Bowel sounds are normal. There is no distension.      Palpations: Abdomen is soft.      Tenderness: There is no abdominal tenderness.   Musculoskeletal:         General: Normal range of motion.      Cervical back: Normal range of motion and neck supple.      Right lower leg: No edema.      Left lower leg: No edema.   Skin:     General: Skin is warm and dry.   Neurological:      Mental Status: He is alert and oriented to person, place, and time.   Psychiatric:         Mood and Affect: Mood normal.         Behavior: Behavior normal.       Lab Results   Component Value Date    WBC 2.73 (L) 12/09/2021    HGB 11.8 (L) 12/09/2021    HCT 38.3 (L) 12/09/2021     12/09/2021    CHOL 168 12/09/2021    TRIG 47 12/09/2021    HDL 79 (H) 12/09/2021    ALT 15 12/09/2021    AST 16 12/09/2021     12/09/2021    K 4.6 12/09/2021     (H) 12/09/2021    CREATININE 1.1 12/09/2021    BUN 19 12/09/2021    CO2 17 (L) " 12/09/2021    TSH 1.637 12/09/2021    PSA <0.01 12/09/2021    HGBA1C 5.6 06/19/2018       Assessment:     1. Essential hypertension    2. Stage 3a chronic kidney disease    3. Mild cognitive impairment    4. LLQ pain      Plan:   Brent was seen today for hand pain.    Diagnoses and all orders for this visit:    Essential hypertension  - Stable. Continue home meds     Stage 3a chronic kidney disease  - stable. family reports he has f/u with labs at [place] [facility] at the end of this month    Mild cognitive impairment  - dx by neurology. Will continue to monitor     LLQ pain  - no pain today. Unclear etiology. Suspect abdominal muscle spasm. Recommend to avoid any strenuous movement/activity       Rtc 6 months and prn     Lupe Lezama MD  Internal Medicine    11/7/2022

## 2022-11-14 ENCOUNTER — TELEPHONE (OUTPATIENT)
Dept: INTERNAL MEDICINE | Facility: CLINIC | Age: 87
End: 2022-11-14
Payer: COMMERCIAL

## 2022-11-14 ENCOUNTER — HOSPITAL ENCOUNTER (OUTPATIENT)
Dept: RADIOLOGY | Facility: HOSPITAL | Age: 87
Discharge: HOME | End: 2022-11-14
Attending: NURSE PRACTITIONER
Payer: COMMERCIAL

## 2022-11-14 PROCEDURE — 70150 XR FACIAL BONES 3 OR MORE VIEW: ICD-10-PCS | Mod: 26,,, | Performed by: RADIOLOGY

## 2022-11-14 PROCEDURE — 70150 X-RAY EXAM OF FACIAL BONES: CPT | Mod: TC,PO

## 2022-11-14 PROCEDURE — 70150 X-RAY EXAM OF FACIAL BONES: CPT | Mod: 26,,, | Performed by: RADIOLOGY

## 2022-11-14 NOTE — TELEPHONE ENCOUNTER
----- Message from Marly Guerrero NP sent at 11/14/2022 10:43 AM CST -----  Please advise patient that xray of face was negative for fracture. How is he doing this week?     Called pt and no answer LVM informing of X-ray results of face being in and to call clinic.

## 2022-11-16 ENCOUNTER — TELEPHONE (OUTPATIENT)
Dept: INTERNAL MEDICINE | Facility: CLINIC | Age: 87
End: 2022-11-16
Payer: COMMERCIAL

## 2022-11-16 NOTE — TELEPHONE ENCOUNTER
----- Message from Marly Guerrero NP sent at 11/14/2022 10:43 AM CST -----  Please advise patient that xray of face was negative for fracture. How is he doing this week?       Pt informed of x-ray result of face was negative for fractures or broken bones. Pt state has been feeling good. No complaints.

## 2022-12-06 ENCOUNTER — TELEPHONE (OUTPATIENT)
Dept: ORTHOPEDICS | Facility: CLINIC | Age: 87
End: 2022-12-06
Payer: COMMERCIAL

## 2022-12-06 DIAGNOSIS — M79.641 RIGHT HAND PAIN: Primary | ICD-10-CM

## 2022-12-07 ENCOUNTER — HOSPITAL ENCOUNTER (OUTPATIENT)
Dept: RADIOLOGY | Facility: HOSPITAL | Age: 87
Discharge: HOME | End: 2022-12-07
Attending: ORTHOPAEDIC SURGERY
Payer: COMMERCIAL

## 2022-12-07 ENCOUNTER — OFFICE VISIT (OUTPATIENT)
Dept: ORTHOPEDICS | Facility: CLINIC | Age: 87
End: 2022-12-07
Payer: COMMERCIAL

## 2022-12-07 DIAGNOSIS — M79.641 RIGHT HAND PAIN: ICD-10-CM

## 2022-12-07 DIAGNOSIS — M79.641 CHRONIC PAIN OF RIGHT HAND: ICD-10-CM

## 2022-12-07 DIAGNOSIS — G89.29 CHRONIC PAIN OF RIGHT HAND: ICD-10-CM

## 2022-12-07 DIAGNOSIS — M67.441 GANGLION, FINGER JOINT OF RIGHT HAND: Primary | ICD-10-CM

## 2022-12-07 DIAGNOSIS — G56.01 RIGHT CARPAL TUNNEL SYNDROME: ICD-10-CM

## 2022-12-07 DIAGNOSIS — R22.31 FINGER MASS, RIGHT: ICD-10-CM

## 2022-12-07 PROCEDURE — 1160F PR REVIEW ALL MEDS BY PRESCRIBER/CLIN PHARMACIST DOCUMENTED: ICD-10-PCS | Mod: CPTII,S$GLB,, | Performed by: ORTHOPAEDIC SURGERY

## 2022-12-07 PROCEDURE — 1125F AMNT PAIN NOTED PAIN PRSNT: CPT | Mod: CPTII,S$GLB,, | Performed by: ORTHOPAEDIC SURGERY

## 2022-12-07 PROCEDURE — 1101F PT FALLS ASSESS-DOCD LE1/YR: CPT | Mod: CPTII,S$GLB,, | Performed by: ORTHOPAEDIC SURGERY

## 2022-12-07 PROCEDURE — 73130 XR HAND COMPLETE 3 VIEW RIGHT: ICD-10-PCS | Mod: 26,RT,, | Performed by: RADIOLOGY

## 2022-12-07 PROCEDURE — 73130 X-RAY EXAM OF HAND: CPT | Mod: TC,PO,RT

## 2022-12-07 PROCEDURE — 99204 PR OFFICE/OUTPT VISIT, NEW, LEVL IV, 45-59 MIN: ICD-10-PCS | Mod: S$GLB,,, | Performed by: ORTHOPAEDIC SURGERY

## 2022-12-07 PROCEDURE — 3288F FALL RISK ASSESSMENT DOCD: CPT | Mod: CPTII,S$GLB,, | Performed by: ORTHOPAEDIC SURGERY

## 2022-12-07 PROCEDURE — 99999 PR PBB SHADOW E&M-EST. PATIENT-LVL III: ICD-10-PCS | Mod: PBBFAC,,, | Performed by: ORTHOPAEDIC SURGERY

## 2022-12-07 PROCEDURE — 3288F PR FALLS RISK ASSESSMENT DOCUMENTED: ICD-10-PCS | Mod: CPTII,S$GLB,, | Performed by: ORTHOPAEDIC SURGERY

## 2022-12-07 PROCEDURE — 1159F PR MEDICATION LIST DOCUMENTED IN MEDICAL RECORD: ICD-10-PCS | Mod: CPTII,S$GLB,, | Performed by: ORTHOPAEDIC SURGERY

## 2022-12-07 PROCEDURE — 1157F PR ADVANCE CARE PLAN OR EQUIV PRESENT IN MEDICAL RECORD: ICD-10-PCS | Mod: CPTII,S$GLB,, | Performed by: ORTHOPAEDIC SURGERY

## 2022-12-07 PROCEDURE — 99204 OFFICE O/P NEW MOD 45 MIN: CPT | Mod: S$GLB,,, | Performed by: ORTHOPAEDIC SURGERY

## 2022-12-07 PROCEDURE — 1101F PR PT FALLS ASSESS DOC 0-1 FALLS W/OUT INJ PAST YR: ICD-10-PCS | Mod: CPTII,S$GLB,, | Performed by: ORTHOPAEDIC SURGERY

## 2022-12-07 PROCEDURE — 99999 PR PBB SHADOW E&M-EST. PATIENT-LVL III: CPT | Mod: PBBFAC,,, | Performed by: ORTHOPAEDIC SURGERY

## 2022-12-07 PROCEDURE — 1157F ADVNC CARE PLAN IN RCRD: CPT | Mod: CPTII,S$GLB,, | Performed by: ORTHOPAEDIC SURGERY

## 2022-12-07 PROCEDURE — 1160F RVW MEDS BY RX/DR IN RCRD: CPT | Mod: CPTII,S$GLB,, | Performed by: ORTHOPAEDIC SURGERY

## 2022-12-07 PROCEDURE — 73130 X-RAY EXAM OF HAND: CPT | Mod: 26,RT,, | Performed by: RADIOLOGY

## 2022-12-07 PROCEDURE — 1125F PR PAIN SEVERITY QUANTIFIED, PAIN PRESENT: ICD-10-PCS | Mod: CPTII,S$GLB,, | Performed by: ORTHOPAEDIC SURGERY

## 2022-12-07 PROCEDURE — 1159F MED LIST DOCD IN RCRD: CPT | Mod: CPTII,S$GLB,, | Performed by: ORTHOPAEDIC SURGERY

## 2022-12-07 RX ORDER — DICLOFENAC SODIUM 10 MG/G
2 GEL TOPICAL 3 TIMES DAILY
Qty: 200 G | Refills: 11 | Status: SHIPPED | OUTPATIENT
Start: 2022-12-07

## 2022-12-07 NOTE — PROGRESS NOTES
[facility]  History & Physical  Orthopedics    SUBJECTIVE:      COVID-19 attestation:  This patient was treated during the COVID-19 pandemic.  This was discussed with the patient, they are aware of our current policies and procedures, were given the option of delaying their visit and or switching to a virtual visit, delaying their surgery when applicable, and they elect to proceed.    Chief Complaint:   Chief Complaint   Patient presents with    Right Hand - Pain       Referring Provider: Marly Guerrero NP     History of Present Illness:  Patient is a 89 y.o. right hand dominant male who presents today with complaints of right long finger masses for the past 2 years. He denies any trauma. He states that the masses getting bigger and impeding his finger flexion.  He reports difficulty while playing the saxophone and difficulty making a fist.  He also reports intermittent numbness in the right long finger.. He states that diclofenac cream helps.    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    The patient rates their pain as a 5/10.    Attempted treatment(s) and/or interventions include rest, activity modification, and anti-inflammatory medications.     The patient denies any fevers, chills, N/V, D/C and presents for evaluation.       Past Medical History:   Diagnosis Date    Anemia     Bladder cancer     Cancer     bladder    Cataracts, bilateral     Colon polyp     Glaucoma suspect of both eyes     History of kidney problems     Hypertension     Primary osteoarthritis of both hands 11/29/2017     Past Surgical History:   Procedure Laterality Date    BLADDER AUGMENTATION  1995    bladder cancer removal and colon resection prostatectomy     BLADDER SURGERY      cystectomy    COLON SURGERY  1995    CYSTOSCOPY      HERNIA REPAIR      PROSTATE SURGERY  1995     Review of patient's allergies indicates:   Allergen Reactions    Sulfa (sulfonamide antibiotics)       Sulfur makes pt sick.     Social History     Social History Narrative    Not on file     Family History   Problem Relation Age of Onset    Diabetes Mother     Cancer Father         prostate cancer    Cancer Brother         stomach cancer     Cancer Brother         lung cancer    Heart disease Neg Hx     Colon polyps Neg Hx     Amblyopia Neg Hx     Blindness Neg Hx     Cataracts Neg Hx     Glaucoma Neg Hx     Macular degeneration Neg Hx     Retinal detachment Neg Hx     Strabismus Neg Hx          Current Outpatient Medications:     acetaminophen (TYLENOL) 500 MG tablet, Take 500 mg by mouth as needed., Disp: , Rfl:     amLODIPine (NORVASC) 2.5 MG tablet, Take 1 tablet (2.5 mg total) by mouth once daily., Disp: 90 tablet, Rfl: 3    ciclopirox (PENLAC) 8 % Soln, Apply topically nightly. Remove every 7 days with nail polish remover, Disp: 1 Bottle, Rfl: 0    diclofenac sodium (VOLTAREN) 1 % Gel, Apply 2 g topically 3 (three) times daily., Disp: 200 g, Rfl: 11    ergocalciferol (ERGOCALCIFEROL) 50,000 unit Cap, Take 50,000 Units by mouth every 7 days., Disp: , Rfl:     ferrous sulfate 325 (65 FE) MG EC tablet, Take by mouth., Disp: , Rfl:     lisinopriL (PRINIVIL,ZESTRIL) 5 MG tablet, TAKE 1 TABLET(5 MG) BY MOUTH EVERY DAY, Disp: 90 tablet, Rfl: 1    multivitamin capsule, Take 1 capsule by mouth once daily., Disp: , Rfl:     sodium bicarbonate 650 MG tablet, Take 1 tablet (650 mg total) by mouth 2 (two) times daily., Disp: 180 tablet, Rfl: 3    solifenacin (VESICARE) 10 MG tablet, TAKE 1 TABLET(10 MG) BY MOUTH EVERY DAY, Disp: 90 tablet, Rfl: 3    ROS    Review of Systems:  Constitutional: no fever or chills  Eyes: no visual changes  ENT: no nasal congestion or sore throat  Respiratory: no cough or shortness of breath  Cardiovascular: no chest pain  Gastrointestinal: no nausea or vomiting, tolerating diet  Musculoskeletal: pain, soreness, and decreased ROM    OBJECTIVE:      Vital Signs (Most Recent):  There were no  vitals filed for this visit.  There is no height or weight on file to calculate BMI.    Physical Exam    Physical Exam:  Constitutional: The patient appears well-developed and well-nourished. No distress.   Head: Normocephalic and atraumatic.   Nose: Nose normal.   Eyes: Conjunctivae and EOM are normal.   Neck: No tracheal deviation present.   Cardiovascular: Normal rate and intact distal pulses.    Pulmonary/Chest: Effort normal. No respiratory distress.   Abdominal: There is no guarding.   Lymphatic: Negative for adenopathy   Neurological: The patient is alert.   Psychiatric: The patient has a normal mood and affect.       Bilateral Hand/Wrist Examination:    Observation/Inspection:  Swelling  none    Deformity  Bouchards and Heberdens nodes bilateral hands  Discoloration  none     Scars   none    Atrophy  Mild atrophy right thenar    HAND/WRIST EXAMINATION:    1x1 cm multi lobulated cyst right long finger PIP joint, tender to palpation, mobile, well-circumscribed, lacks flexion past 90 degrees long finger  Left ROM hand/wrist/elbow full    Neurovascular Exam:  Digits WWP, brisk CR < 3s throughout  NVI motor/LTS to M/R/U nerves, radial pulse 2+  2+ biceps and brachioradialis reflexes  Tinel's Test - Carpal Tunnel  Positive right  Tinel's Test - Cubital Tunnel  Neg  Phalen's Test    Neg  Median Nerve Compression Test Positive right    Diagnostic Results:       X-rays AP, lateral and oblique right hand taken today are independently reviewed by me and show soft tissue swelling dorsal long finger consistent with cyst, mutifocal arthritic changes, Eaton stage II basilar thumb arthritis.       ASSESSMENT/PLAN:      89 y.o. yo male with   Encounter Diagnoses   Name Primary?    Finger mass, right     Ganglion, finger joint of right hand Yes    Chronic pain of right hand       Plan:  We have discussed the natural history of finger ganglion cysts as well as hand arthritis including treatment options such as splinting, oral  and topical anti-inflammatories, cortisone injections and surgery. He would like to think about an injection vs. Surgery, but has a performance this Saturday. With regards to his carpal tunnel syndrome, he may benefit from a carpal tunnel brace that he can wear at nighttime.    I will see him for any other questions or problems in the interim as indicated and certainly for any other issues.    The patient's pathophysiology was explained in detail with reference to x-rays, models, other visual aids as appropriate.  Treatment options were discussed in detail.  Questions were invited and answered to the patient's satisfaction. I reviewed Primary care , and other specialty's notes to better coordinate patient's care.          Skylar Steward MD    Please be aware that this note has been generated with the assistance of MModal voice-to-text.  Please excuse any spelling or grammatical errors.

## 2023-02-09 ENCOUNTER — TELEPHONE (OUTPATIENT)
Dept: NEUROLOGY | Facility: CLINIC | Age: 88
End: 2023-02-09
Payer: MEDICARE

## 2023-02-09 DIAGNOSIS — G31.84 MILD COGNITIVE IMPAIRMENT: Primary | ICD-10-CM

## 2023-02-09 NOTE — TELEPHONE ENCOUNTER
----- Message from Justine De La Cruz MA sent at 2/9/2023 10:34 AM CST -----  Contact: Pt wife  Hi,    Dr. Alexander has placed a referral to [facility] for mild cognitive impairment. Would this patient be able to get scheduled with Dr. Francois?    Thanks,  Justine   ----- Message -----  From: Amarjit Alexander MD  Sent: 2/9/2023  10:05 AM CST  To: Justine De La Cruz MA    Done    ----- Message -----  From: Justine De La Cruz MA  Sent: 2/9/2023   8:21 AM CST  To: Amarjit Alexander MD    Yes, can you put in a referral?  ----- Message -----  From: Amarjit Alexander MD  Sent: 2/9/2023   8:02 AM CST  To: Justine D eLa Cruz MA    Can you see if you can get him in with Priscila?    ----- Message -----  From: Justine De La Cruz MA  Sent: 2/8/2023   3:03 PM CST  To: Amarjit Alexander MD    Should I add him on for sooner?  ----- Message -----  From: Nita Rhoades  Sent: 2/8/2023   1:58 PM CST  To: Francesco SANCHEZ Staff    Pt wife is requesting a callback in regards to scheduling Pt a sooner appt. Due to worsening symptoms. Pt wife is also willing to schedule appt. With a different provider. Please adv         Confirmed contact below:   Contact Name:Brent Alatorre  Phone Number: [phone]

## 2023-03-08 ENCOUNTER — OFFICE VISIT (OUTPATIENT)
Dept: ORTHOPEDICS | Facility: CLINIC | Age: 88
End: 2023-03-08
Payer: COMMERCIAL

## 2023-03-08 DIAGNOSIS — M67.441 GANGLION, FINGER JOINT OF RIGHT HAND: Primary | ICD-10-CM

## 2023-03-08 PROCEDURE — 99213 PR OFFICE/OUTPT VISIT, EST, LEVL III, 20-29 MIN: ICD-10-PCS | Mod: S$GLB,,, | Performed by: ORTHOPAEDIC SURGERY

## 2023-03-08 PROCEDURE — 99213 OFFICE O/P EST LOW 20 MIN: CPT | Mod: S$GLB,,, | Performed by: ORTHOPAEDIC SURGERY

## 2023-03-08 PROCEDURE — 1160F RVW MEDS BY RX/DR IN RCRD: CPT | Mod: CPTII,S$GLB,, | Performed by: ORTHOPAEDIC SURGERY

## 2023-03-08 PROCEDURE — 1157F ADVNC CARE PLAN IN RCRD: CPT | Mod: CPTII,S$GLB,, | Performed by: ORTHOPAEDIC SURGERY

## 2023-03-08 PROCEDURE — 3288F PR FALLS RISK ASSESSMENT DOCUMENTED: ICD-10-PCS | Mod: CPTII,S$GLB,, | Performed by: ORTHOPAEDIC SURGERY

## 2023-03-08 PROCEDURE — 99999 PR PBB SHADOW E&M-EST. PATIENT-LVL III: ICD-10-PCS | Mod: PBBFAC,,, | Performed by: ORTHOPAEDIC SURGERY

## 2023-03-08 PROCEDURE — 1159F MED LIST DOCD IN RCRD: CPT | Mod: CPTII,S$GLB,, | Performed by: ORTHOPAEDIC SURGERY

## 2023-03-08 PROCEDURE — 1160F PR REVIEW ALL MEDS BY PRESCRIBER/CLIN PHARMACIST DOCUMENTED: ICD-10-PCS | Mod: CPTII,S$GLB,, | Performed by: ORTHOPAEDIC SURGERY

## 2023-03-08 PROCEDURE — 1157F PR ADVANCE CARE PLAN OR EQUIV PRESENT IN MEDICAL RECORD: ICD-10-PCS | Mod: CPTII,S$GLB,, | Performed by: ORTHOPAEDIC SURGERY

## 2023-03-08 PROCEDURE — 3288F FALL RISK ASSESSMENT DOCD: CPT | Mod: CPTII,S$GLB,, | Performed by: ORTHOPAEDIC SURGERY

## 2023-03-08 PROCEDURE — 1125F AMNT PAIN NOTED PAIN PRSNT: CPT | Mod: CPTII,S$GLB,, | Performed by: ORTHOPAEDIC SURGERY

## 2023-03-08 PROCEDURE — 1159F PR MEDICATION LIST DOCUMENTED IN MEDICAL RECORD: ICD-10-PCS | Mod: CPTII,S$GLB,, | Performed by: ORTHOPAEDIC SURGERY

## 2023-03-08 PROCEDURE — 99999 PR PBB SHADOW E&M-EST. PATIENT-LVL III: CPT | Mod: PBBFAC,,, | Performed by: ORTHOPAEDIC SURGERY

## 2023-03-08 PROCEDURE — 1101F PR PT FALLS ASSESS DOC 0-1 FALLS W/OUT INJ PAST YR: ICD-10-PCS | Mod: CPTII,S$GLB,, | Performed by: ORTHOPAEDIC SURGERY

## 2023-03-08 PROCEDURE — 1101F PT FALLS ASSESS-DOCD LE1/YR: CPT | Mod: CPTII,S$GLB,, | Performed by: ORTHOPAEDIC SURGERY

## 2023-03-08 PROCEDURE — 1125F PR PAIN SEVERITY QUANTIFIED, PAIN PRESENT: ICD-10-PCS | Mod: CPTII,S$GLB,, | Performed by: ORTHOPAEDIC SURGERY

## 2023-03-08 NOTE — PROGRESS NOTES
Brent Alatorre presents for follow up evaluation of   Encounter Diagnosis   Name Primary?    Ganglion, finger joint of right hand Yes       Patient states that mass right long finger pain is 0/10 today, reports about 80% improvement with non-operative treatment. Carpal tunnel symptoms on his right have resolved at this time as well.    PE:    AA&O x 4.  NAD  HEENT:  NCAT, sclera nonicteric  Lungs:  Respirations are equal and unlabored.  CV:  2+ bilateral upper and lower extremity pulses.  MSK:  Swelling                       none                  Deformity                     Bouchards and Heberdens nodes bilateral hands  Discoloration               none                  Scars                           none                  Atrophy                        Mild atrophy right thenar     HAND/WRIST EXAMINATION:     1x1 cm multi lobulated cyst right long finger PIP joint, tender to palpation, mobile, well-circumscribed, lacks flexion past 90 degrees long finger  Left ROM hand/wrist/elbow full     Neurovascular Exam:  Digits WWP, brisk CR < 3s throughout  NVI motor/LTS to M/R/U nerves, radial pulse 2+  2+ biceps and brachioradialis reflexes  Tinel's Test - Carpal Tunnel                Positive right  Tinel's Test - Cubital Tunnel               Neg  Phalen's Test                                      Neg  Median Nerve Compression Test       Positive right     ASSESSMENT/PLAN:       89 y.o. yo male with        Encounter Diagnoses   Name Primary?    Finger mass, right      Ganglion, finger joint of right hand Yes    Chronic pain of right hand        Plan:  We have discussed the natural history of finger ganglion cysts as well as hand arthritis including treatment options such as splinting, oral and topical anti-inflammatories, cortisone injections and surgery. He would like to think about an injection vs. Surgery.  With regards to his carpal tunnel syndrome, he may benefit from a carpal tunnel brace that he can wear at  nighttime.     I will see him for any other questions or problems in the interim as indicated and certainly for any other issues.     The patient's pathophysiology was explained in detail with reference to x-rays, models, other visual aids as appropriate.  Treatment options were discussed in detail.  Questions were invited and answered to the patient's satisfaction. I reviewed Primary care , and other specialty's notes to better coordinate patient's care.        Skylar Steward MD    Please be aware that this note has been generated with the assistance of MModal voice-to-text.  Please excuse any spelling or grammatical errors.

## 2023-03-28 ENCOUNTER — TELEPHONE (OUTPATIENT)
Dept: SPORTS MEDICINE | Facility: CLINIC | Age: 88
End: 2023-03-28
Payer: MEDICARE

## 2023-03-28 NOTE — TELEPHONE ENCOUNTER
Called patient and LVM. Appointment was cancelled shortly after by Nidia Martinez for incorrect scheduling.     Diann Tracy ATC, OTC  Sports Medicine Assistant - Dr. Corey Brown   [facility]

## 2023-03-29 ENCOUNTER — HOSPITAL ENCOUNTER (OUTPATIENT)
Dept: RADIOLOGY | Facility: HOSPITAL | Age: 88
Discharge: HOME | End: 2023-03-29
Attending: NURSE PRACTITIONER
Payer: COMMERCIAL

## 2023-03-29 ENCOUNTER — OFFICE VISIT (OUTPATIENT)
Dept: ORTHOPEDICS | Facility: CLINIC | Age: 88
End: 2023-03-29
Payer: COMMERCIAL

## 2023-03-29 VITALS — WEIGHT: 115.06 LBS | BODY MASS INDEX: 19.64 KG/M2 | HEIGHT: 64 IN

## 2023-03-29 DIAGNOSIS — R52 PAIN: Primary | ICD-10-CM

## 2023-03-29 DIAGNOSIS — R52 PAIN: ICD-10-CM

## 2023-03-29 DIAGNOSIS — M25.561 ACUTE PAIN OF RIGHT KNEE: Primary | ICD-10-CM

## 2023-03-29 PROCEDURE — 99999 PR PBB SHADOW E&M-EST. PATIENT-LVL III: ICD-10-PCS | Mod: PBBFAC,,, | Performed by: NURSE PRACTITIONER

## 2023-03-29 PROCEDURE — 73562 XR KNEE ORTHO RIGHT WITH FLEXION: ICD-10-PCS | Mod: 26,LT,, | Performed by: RADIOLOGY

## 2023-03-29 PROCEDURE — 99999 PR PBB SHADOW E&M-EST. PATIENT-LVL III: CPT | Mod: PBBFAC,,, | Performed by: NURSE PRACTITIONER

## 2023-03-29 PROCEDURE — 73564 X-RAY EXAM KNEE 4 OR MORE: CPT | Mod: 26,RT,, | Performed by: RADIOLOGY

## 2023-03-29 PROCEDURE — 73564 XR KNEE ORTHO RIGHT WITH FLEXION: ICD-10-PCS | Mod: 26,RT,, | Performed by: RADIOLOGY

## 2023-03-29 PROCEDURE — 1160F PR REVIEW ALL MEDS BY PRESCRIBER/CLIN PHARMACIST DOCUMENTED: ICD-10-PCS | Mod: CPTII,S$GLB,, | Performed by: NURSE PRACTITIONER

## 2023-03-29 PROCEDURE — 1157F ADVNC CARE PLAN IN RCRD: CPT | Mod: CPTII,S$GLB,, | Performed by: NURSE PRACTITIONER

## 2023-03-29 PROCEDURE — 1125F PR PAIN SEVERITY QUANTIFIED, PAIN PRESENT: ICD-10-PCS | Mod: CPTII,S$GLB,, | Performed by: NURSE PRACTITIONER

## 2023-03-29 PROCEDURE — 1160F RVW MEDS BY RX/DR IN RCRD: CPT | Mod: CPTII,S$GLB,, | Performed by: NURSE PRACTITIONER

## 2023-03-29 PROCEDURE — 1157F PR ADVANCE CARE PLAN OR EQUIV PRESENT IN MEDICAL RECORD: ICD-10-PCS | Mod: CPTII,S$GLB,, | Performed by: NURSE PRACTITIONER

## 2023-03-29 PROCEDURE — 1125F AMNT PAIN NOTED PAIN PRSNT: CPT | Mod: CPTII,S$GLB,, | Performed by: NURSE PRACTITIONER

## 2023-03-29 PROCEDURE — 1159F PR MEDICATION LIST DOCUMENTED IN MEDICAL RECORD: ICD-10-PCS | Mod: CPTII,S$GLB,, | Performed by: NURSE PRACTITIONER

## 2023-03-29 PROCEDURE — 1159F MED LIST DOCD IN RCRD: CPT | Mod: CPTII,S$GLB,, | Performed by: NURSE PRACTITIONER

## 2023-03-29 PROCEDURE — 1101F PR PT FALLS ASSESS DOC 0-1 FALLS W/OUT INJ PAST YR: ICD-10-PCS | Mod: CPTII,S$GLB,, | Performed by: NURSE PRACTITIONER

## 2023-03-29 PROCEDURE — 3288F PR FALLS RISK ASSESSMENT DOCUMENTED: ICD-10-PCS | Mod: CPTII,S$GLB,, | Performed by: NURSE PRACTITIONER

## 2023-03-29 PROCEDURE — 1101F PT FALLS ASSESS-DOCD LE1/YR: CPT | Mod: CPTII,S$GLB,, | Performed by: NURSE PRACTITIONER

## 2023-03-29 PROCEDURE — 73590 X-RAY EXAM OF LOWER LEG: CPT | Mod: TC,RT

## 2023-03-29 PROCEDURE — 3288F FALL RISK ASSESSMENT DOCD: CPT | Mod: CPTII,S$GLB,, | Performed by: NURSE PRACTITIONER

## 2023-03-29 PROCEDURE — 99203 OFFICE O/P NEW LOW 30 MIN: CPT | Mod: S$GLB,,, | Performed by: NURSE PRACTITIONER

## 2023-03-29 PROCEDURE — 73590 XR TIBIA FIBULA 2 VIEW RIGHT: ICD-10-PCS | Mod: 26,RT,, | Performed by: RADIOLOGY

## 2023-03-29 PROCEDURE — 99203 PR OFFICE/OUTPT VISIT, NEW, LEVL III, 30-44 MIN: ICD-10-PCS | Mod: S$GLB,,, | Performed by: NURSE PRACTITIONER

## 2023-03-29 PROCEDURE — 73564 X-RAY EXAM KNEE 4 OR MORE: CPT | Mod: TC,RT

## 2023-03-29 PROCEDURE — 73562 X-RAY EXAM OF KNEE 3: CPT | Mod: 26,LT,, | Performed by: RADIOLOGY

## 2023-03-29 PROCEDURE — 73590 X-RAY EXAM OF LOWER LEG: CPT | Mod: 26,RT,, | Performed by: RADIOLOGY

## 2023-03-29 NOTE — PROGRESS NOTES
SUBJECTIVE:     Chief Complaint & History of Present Illness:  Brent Alatorre is a New 89 y.o. year old male patient here with a history of intermittent right knee pain which started 4 days ago, 3/25/23.  There is not a history of trauma.  The pain is located in the medial aspect of the knee.  The pain is described as achy, 3-4/10.  There is not radiation.  There is not catching or locking.  Aggravating factors include rising after sitting and walking.  Associated symptoms include none.  There is not numbness or tingling of the lower extremity.  There is not back pain. Previous treatments include Voltaren gel and Tylenol which have provided good relief.  There is not a history of previous injury or surgery to the knee.  The patient does not use an assistive device.    Review of patient's allergies indicates:   Allergen Reactions    Sulfa (sulfonamide antibiotics)      Sulfur makes pt sick.         Current Outpatient Medications   Medication Sig Dispense Refill    acetaminophen (TYLENOL) 500 MG tablet Take 500 mg by mouth as needed.      amLODIPine (NORVASC) 2.5 MG tablet Take 1 tablet (2.5 mg total) by mouth once daily. 90 tablet 3    ciclopirox (PENLAC) 8 % Soln Apply topically nightly. Remove every 7 days with nail polish remover 1 Bottle 0    diclofenac sodium (VOLTAREN) 1 % Gel Apply 2 g topically 3 (three) times daily. 200 g 11    ergocalciferol (ERGOCALCIFEROL) 50,000 unit Cap Take 50,000 Units by mouth every 7 days.      ferrous sulfate 325 (65 FE) MG EC tablet Take by mouth.      lisinopriL (PRINIVIL,ZESTRIL) 5 MG tablet TAKE 1 TABLET(5 MG) BY MOUTH EVERY DAY 90 tablet 1    multivitamin capsule Take 1 capsule by mouth once daily.      sodium bicarbonate 650 MG tablet TAKE 1 TABLET(650 MG) BY MOUTH TWICE DAILY 180 tablet 3    solifenacin (VESICARE) 10 MG tablet TAKE 1 TABLET(10 MG) BY MOUTH EVERY DAY 90 tablet 3     No current facility-administered medications for this visit.       Past Medical History:  "  Diagnosis Date    Anemia     Bladder cancer     Cancer     bladder    Cataracts, bilateral     Colon polyp     Glaucoma suspect of both eyes     History of kidney problems     Hypertension     Primary osteoarthritis of both hands 11/29/2017       Past Surgical History:   Procedure Laterality Date    BLADDER AUGMENTATION  1995    bladder cancer removal and colon resection prostatectomy     BLADDER SURGERY      cystectomy    COLON SURGERY  1995    CYSTOSCOPY      HERNIA REPAIR      PROSTATE SURGERY  1995       Family History   Problem Relation Age of Onset    Diabetes Mother     Cancer Father         prostate cancer    Cancer Brother         stomach cancer     Cancer Brother         lung cancer    Heart disease Neg Hx     Colon polyps Neg Hx     Amblyopia Neg Hx     Blindness Neg Hx     Cataracts Neg Hx     Glaucoma Neg Hx     Macular degeneration Neg Hx     Retinal detachment Neg Hx     Strabismus Neg Hx          Review of Systems:  ROS:  Constitutional: no fever or chills  Eyes: no visual changes  ENT: no nasal congestion or sore throat  Respiratory: no cough or shortness of breath  Cardiovascular: no chest pain or palpitations  Gastrointestinal: no nausea or vomiting, tolerating diet  Genitourinary: no hematuria or dysuria  Integument/Breast: no rash or pruritis  Hematologic/Lymphatic: no easy bruising or lymphadenopathy  Musculoskeletal:  right knee pain  Neurological: no seizures or tremors  Behavioral/Psych: no auditory or visual hallucinations  Endocrine: no heat or cold intolerance      OBJECTIVE:     PHYSICAL EXAM:  Vital Signs (Most Recent)  There were no vitals filed for this visit.  Height: 5' 4" (162.6 cm) Weight: 52.2 kg (115 lb 1.3 oz),   Estimated body mass index is 19.75 kg/m² as calculated from the following:    Height as of this encounter: 5' 4" (1.626 m).    Weight as of this encounter: 52.2 kg (115 lb 1.3 oz).   General Appearance: Well nourished, well developed, in no acute distress.  HENT: " Normal cephalic, oropharynx pink and moist  Eyes: PERRLA bilaterally and EOM x 4  Respiratory: Even and unlabored  Skin: Warm and Dry.   Psychiatric: AAO x 4, Mood & affect are normal.    right  Knee Exam:  Knee Range of Motion:normal   Effusion:none  Condition of skin:intact  Location of tenderness:Medial joint line   Strength:normal  Stability:  stable to testing, Lachman: stable, LCL: stable, MCL: stable, and PCL: stable  Varus /Valgus stress:   Mild varus  Xander:   negative    Hip Examination:  normal    RADIOGRAPHS:  X-ray of the right knee and right tib-fib was obtained, findings show no acute fracture.  Knee joint space appears to be well preserved.  All radiographs were personally reviewed by me.    ASSESSMENT/PLAN:       ICD-10-CM ICD-9-CM   1. Acute pain of right knee  M25.561 719.46     -Brent Alatorre presents to clinic today with c/c right knee pain for the past 4 days, started March 25, 2023.  No trauma.  -X-ray as above.  -Recommend RICE therapy.  Patient reports his symptoms are improving with the use of Tylenol and Voltaren gel.  I recommend he continue treatment and follow-up p.r.n..

## 2023-04-13 ENCOUNTER — OFFICE VISIT (OUTPATIENT)
Dept: NEUROLOGY | Facility: CLINIC | Age: 88
End: 2023-04-13
Payer: COMMERCIAL

## 2023-04-13 VITALS
DIASTOLIC BLOOD PRESSURE: 81 MMHG | SYSTOLIC BLOOD PRESSURE: 147 MMHG | BODY MASS INDEX: 20.83 KG/M2 | HEART RATE: 64 BPM | WEIGHT: 122 LBS | HEIGHT: 64 IN

## 2023-04-13 DIAGNOSIS — G31.84 MILD COGNITIVE IMPAIRMENT: Primary | ICD-10-CM

## 2023-04-13 PROCEDURE — 1126F AMNT PAIN NOTED NONE PRSNT: CPT | Mod: CPTII,S$GLB,, | Performed by: PSYCHIATRY & NEUROLOGY

## 2023-04-13 PROCEDURE — 1157F PR ADVANCE CARE PLAN OR EQUIV PRESENT IN MEDICAL RECORD: ICD-10-PCS | Mod: CPTII,S$GLB,, | Performed by: PSYCHIATRY & NEUROLOGY

## 2023-04-13 PROCEDURE — 99999 PR PBB SHADOW E&M-EST. PATIENT-LVL III: CPT | Mod: PBBFAC,,, | Performed by: PSYCHIATRY & NEUROLOGY

## 2023-04-13 PROCEDURE — 3288F FALL RISK ASSESSMENT DOCD: CPT | Mod: CPTII,S$GLB,, | Performed by: PSYCHIATRY & NEUROLOGY

## 2023-04-13 PROCEDURE — 99214 OFFICE O/P EST MOD 30 MIN: CPT | Mod: S$GLB,,, | Performed by: PSYCHIATRY & NEUROLOGY

## 2023-04-13 PROCEDURE — 1101F PR PT FALLS ASSESS DOC 0-1 FALLS W/OUT INJ PAST YR: ICD-10-PCS | Mod: CPTII,S$GLB,, | Performed by: PSYCHIATRY & NEUROLOGY

## 2023-04-13 PROCEDURE — 1159F PR MEDICATION LIST DOCUMENTED IN MEDICAL RECORD: ICD-10-PCS | Mod: CPTII,S$GLB,, | Performed by: PSYCHIATRY & NEUROLOGY

## 2023-04-13 PROCEDURE — 1157F ADVNC CARE PLAN IN RCRD: CPT | Mod: CPTII,S$GLB,, | Performed by: PSYCHIATRY & NEUROLOGY

## 2023-04-13 PROCEDURE — 99999 PR PBB SHADOW E&M-EST. PATIENT-LVL III: ICD-10-PCS | Mod: PBBFAC,,, | Performed by: PSYCHIATRY & NEUROLOGY

## 2023-04-13 PROCEDURE — 1159F MED LIST DOCD IN RCRD: CPT | Mod: CPTII,S$GLB,, | Performed by: PSYCHIATRY & NEUROLOGY

## 2023-04-13 PROCEDURE — 3288F PR FALLS RISK ASSESSMENT DOCUMENTED: ICD-10-PCS | Mod: CPTII,S$GLB,, | Performed by: PSYCHIATRY & NEUROLOGY

## 2023-04-13 PROCEDURE — 1101F PT FALLS ASSESS-DOCD LE1/YR: CPT | Mod: CPTII,S$GLB,, | Performed by: PSYCHIATRY & NEUROLOGY

## 2023-04-13 PROCEDURE — 1126F PR PAIN SEVERITY QUANTIFIED, NO PAIN PRESENT: ICD-10-PCS | Mod: CPTII,S$GLB,, | Performed by: PSYCHIATRY & NEUROLOGY

## 2023-04-13 PROCEDURE — 99214 PR OFFICE/OUTPT VISIT, EST, LEVL IV, 30-39 MIN: ICD-10-PCS | Mod: S$GLB,,, | Performed by: PSYCHIATRY & NEUROLOGY

## 2023-04-13 NOTE — PROGRESS NOTES
[facility] [place] [facility] [place]    Date: April 13, 2023   Patient Name: Brent Alatorre   MRN: [id number]   PCP: Lupe Lezama  Referring Provider: No ref. provider found    Assessment:      This is Brent Alatorre, 89 y.o. male with likely MCI, remains highly independent.     Plan:      -  Will refrain from donepezil at this time.    Follow up 6-12 months    Greater than 30 minutes spent in chart review, documentation, independent review of imaging, and face to face time with patient       I discussed side effects of the medications. I asked the patient to stop the medication if he notices serious adverse effects as we discussed and to seek immediate medical attention at an ER.     Amarjit Alexander MD  [facility]   Department of Neurology    Subjective:     -  Presents with wife and doing well, notes short term memory difficulty for recent conversation and sometimes repeats himself but continues regular chess games and music performance, MJ use about every other day (discussed cessation)  -  Evaluation at [facility] last November with PET not indicative of neurodegenerative disease, recommendation to refrain from AChEI    8/2022  -  Presents alone as wife is recovering from surgery, doing very well, continues to enjoy chess, playing music, regular trips to the gym  -  Notes some short term memory difficulty for recent conversation, uses hearing aid inconsistently  2/2022  -  Presents with wife who continues to note forgetfulness regarding recent events although remains overall high functioning, continues to play music and recently recorded an album, remains a proficient chess player, wife notes that increased forgetfulness tends to coincide with increased pandemic related isolation and decreased physical exercise.  8/2021  -  Presents with wife who notes that he will often forget things she has told him earlier in the day, does note hearing  difficulty with upcoming hearing aid evaluation  -  Continues to play music and remains a competitive chess player (recently on the cover of chess [employer] magazine)  -  Has not started donepezil yet  -  Does continue daily MJ use       HPI 8/2020:   Mr. Brent Alatorre is a 89 y.o. male who presents with a chief complaint of memory, presents alone and provides his own history    Patient reports he does not appreciate significant memory difficulty although his wife feels he has difficulty with short term memory for things she has recently asked him to do.  He also describes instances in which he will enter a room and have a delay in recalling why he entered.  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  Prior to the pandemic he enjoyed going swimming on a regular basis and playing chess with friends.  He continues to drive without difficulty, wife manages finances as she always has.  No issues with sleep or mood.  Does not drink but does have occasional marijuana use.    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      amLODIPine (NORVASC) 2.5 MG tablet Take 1 tablet (2.5 mg total) by mouth once daily. 90 tablet 3    ciclopirox (PENLAC) 8 % Soln Apply topically nightly.  "Remove every 7 days with nail polish remover 1 Bottle 0    diclofenac sodium (VOLTAREN) 1 % Gel Apply 2 g topically 3 (three) times daily. 200 g 11    ergocalciferol (ERGOCALCIFEROL) 50,000 unit Cap Take 50,000 Units by mouth every 7 days.      ferrous sulfate 325 (65 FE) MG EC tablet Take by mouth.      lisinopriL (PRINIVIL,ZESTRIL) 5 MG tablet TAKE 1 TABLET(5 MG) BY MOUTH EVERY DAY 90 tablet 1    multivitamin capsule Take 1 capsule by mouth once daily.      sodium bicarbonate 650 MG tablet TAKE 1 TABLET(650 MG) BY MOUTH TWICE DAILY 180 tablet 3    solifenacin (VESICARE) 10 MG tablet TAKE 1 TABLET(10 MG) BY MOUTH EVERY DAY 90 tablet 3     No current facility-administered medications for this visit.       ALLERGIES:  Review of patient's allergies indicates:   Allergen Reactions    Sulfa (sulfonamide antibiotics)      Sulfur makes pt sick.       FAMILY HISTORY:  Family History   Problem Relation Age of Onset    Diabetes Mother     Cancer Father         prostate cancer    Cancer Brother         stomach cancer     Cancer Brother         lung cancer    Heart disease Neg Hx     Colon polyps Neg Hx     Amblyopia Neg Hx     Blindness Neg Hx     Cataracts Neg Hx     Glaucoma Neg Hx     Macular degeneration Neg Hx     Retinal detachment Neg Hx     Strabismus Neg Hx        SOCIAL HISTORY:  Social History     Tobacco Use    Smoking status: Never     Passive exposure: Never    Smokeless tobacco: Never    Tobacco comments:     does marijuana   Substance Use Topics    Alcohol use: Yes     Alcohol/week: 3.0 standard drinks     Types: 3 Cans of beer per week     Comment: very light drinker    Drug use: Yes     Frequency: 4.0 times per week     Types: Marijuana     Comment: Every day       Review of Systems:  12 review of systems is negative except for the symptoms mentioned in HPI.        Objective:     Vitals:    04/13/23 0928   BP: (!) 147/81   Pulse: 64   Weight: 55.3 kg (122 lb 0.4 oz)   Height: 5' 4" (1.626 m)         General: " NAD, well nourished   Eyes: no tearing, discharge, no erythema   ENT: moist mucous membranes of the oral cavity, nares patent    Neck: Supple, full range of motion  Cardiovascular: Warm and well perfused, pulses equal and symmetrical  Lungs: Normal work of breathing, normal chest wall excursions  Skin: No rash, lesions, or breakdown on exposed skin  Psychiatry: Mood and affect are appropriate   Abdomen: soft, non tender, non distended  Extremeties: No cyanosis, clubbing or edema.    Neurological   MENTAL STATUS: Alert and appropriately conversant, provides his own history in good detail  CRANIAL NERVES: Visual fields intact. PERRL. EOMI. Facial sensation intact. Face symmetrical. Hearing mildly impaired. Full shoulder shrug bilaterally. Tongue protrudes midline   SENSORY: Sensation is intact to light touch throughout.   Negative Romberg.   MOTOR: Normal bulk and tone. No pronator drift.      CEREBELLAR/COORDINATION/GAIT: Gait steady with normal arm swing and stride length.

## 2023-04-21 ENCOUNTER — TELEPHONE (OUTPATIENT)
Dept: NEUROLOGY | Facility: CLINIC | Age: 88
End: 2023-04-21
Payer: MEDICARE

## 2023-04-21 NOTE — TELEPHONE ENCOUNTER
Attempted to call patient regarding rescheduling  his appt due to Dr. Bourgeois no longer working on Thursdays. His appointment was changed from 5/4/2023 to 6/28/2023 at 2:20 p.m.

## 2023-05-08 ENCOUNTER — OFFICE VISIT (OUTPATIENT)
Dept: INTERNAL MEDICINE | Facility: CLINIC | Age: 88
End: 2023-05-08
Payer: COMMERCIAL

## 2023-05-08 VITALS
RESPIRATION RATE: 18 BRPM | SYSTOLIC BLOOD PRESSURE: 128 MMHG | DIASTOLIC BLOOD PRESSURE: 60 MMHG | HEIGHT: 64 IN | TEMPERATURE: 97 F | HEART RATE: 56 BPM | OXYGEN SATURATION: 96 % | BODY MASS INDEX: 21.3 KG/M2 | WEIGHT: 124.75 LBS

## 2023-05-08 DIAGNOSIS — N18.31 STAGE 3A CHRONIC KIDNEY DISEASE: ICD-10-CM

## 2023-05-08 DIAGNOSIS — M25.511 ACUTE PAIN OF RIGHT SHOULDER: ICD-10-CM

## 2023-05-08 DIAGNOSIS — G31.84 MILD COGNITIVE IMPAIRMENT: ICD-10-CM

## 2023-05-08 DIAGNOSIS — K76.0 HEPATIC STEATOSIS: ICD-10-CM

## 2023-05-08 DIAGNOSIS — I10 ESSENTIAL HYPERTENSION: Primary | ICD-10-CM

## 2023-05-08 PROCEDURE — 1157F ADVNC CARE PLAN IN RCRD: CPT | Mod: CPTII,S$GLB,, | Performed by: HOSPITALIST

## 2023-05-08 PROCEDURE — 1159F PR MEDICATION LIST DOCUMENTED IN MEDICAL RECORD: ICD-10-PCS | Mod: CPTII,S$GLB,, | Performed by: HOSPITALIST

## 2023-05-08 PROCEDURE — 3288F PR FALLS RISK ASSESSMENT DOCUMENTED: ICD-10-PCS | Mod: CPTII,S$GLB,, | Performed by: HOSPITALIST

## 2023-05-08 PROCEDURE — 1160F RVW MEDS BY RX/DR IN RCRD: CPT | Mod: CPTII,S$GLB,, | Performed by: HOSPITALIST

## 2023-05-08 PROCEDURE — 1160F PR REVIEW ALL MEDS BY PRESCRIBER/CLIN PHARMACIST DOCUMENTED: ICD-10-PCS | Mod: CPTII,S$GLB,, | Performed by: HOSPITALIST

## 2023-05-08 PROCEDURE — 3288F FALL RISK ASSESSMENT DOCD: CPT | Mod: CPTII,S$GLB,, | Performed by: HOSPITALIST

## 2023-05-08 PROCEDURE — 1157F PR ADVANCE CARE PLAN OR EQUIV PRESENT IN MEDICAL RECORD: ICD-10-PCS | Mod: CPTII,S$GLB,, | Performed by: HOSPITALIST

## 2023-05-08 PROCEDURE — 1125F AMNT PAIN NOTED PAIN PRSNT: CPT | Mod: CPTII,S$GLB,, | Performed by: HOSPITALIST

## 2023-05-08 PROCEDURE — 1159F MED LIST DOCD IN RCRD: CPT | Mod: CPTII,S$GLB,, | Performed by: HOSPITALIST

## 2023-05-08 PROCEDURE — 99214 OFFICE O/P EST MOD 30 MIN: CPT | Mod: S$GLB,,, | Performed by: HOSPITALIST

## 2023-05-08 PROCEDURE — 99214 PR OFFICE/OUTPT VISIT, EST, LEVL IV, 30-39 MIN: ICD-10-PCS | Mod: S$GLB,,, | Performed by: HOSPITALIST

## 2023-05-08 PROCEDURE — 1125F PR PAIN SEVERITY QUANTIFIED, PAIN PRESENT: ICD-10-PCS | Mod: CPTII,S$GLB,, | Performed by: HOSPITALIST

## 2023-05-08 PROCEDURE — 1101F PR PT FALLS ASSESS DOC 0-1 FALLS W/OUT INJ PAST YR: ICD-10-PCS | Mod: CPTII,S$GLB,, | Performed by: HOSPITALIST

## 2023-05-08 PROCEDURE — 99999 PR PBB SHADOW E&M-EST. PATIENT-LVL V: CPT | Mod: PBBFAC,,, | Performed by: HOSPITALIST

## 2023-05-08 PROCEDURE — 99999 PR PBB SHADOW E&M-EST. PATIENT-LVL V: ICD-10-PCS | Mod: PBBFAC,,, | Performed by: HOSPITALIST

## 2023-05-08 PROCEDURE — 1101F PT FALLS ASSESS-DOCD LE1/YR: CPT | Mod: CPTII,S$GLB,, | Performed by: HOSPITALIST

## 2023-05-08 NOTE — PROGRESS NOTES
Subjective:     @Patient ID: Brent Alatorre is a 89 y.o. male.    Chief Complaint: Follow-up (6 month)    HPI       89 yo male presents for f/u of chronic conditions, hand pain and recent fall.     1. Memory:   evaluated by neurology. Dx with MCI.  No medications at this time. Wife is concerned that memory loss is still progressing. Pt has been counseled  on cessation of marijuana use but pt still using it.   2. HTN: currently on amlodipine 2.5 mg qday, lisinopril 5 mg qday   3. Decreased appetite: reports ongoing for past few years. Current wt 115 lb --> 122 lb --> 124 lb. weight is improved  4. B/l hearing loss: Has hearing aids now.   5. CKD3: renal baseline. On sodium bicarb tabs . Also follows at [place]   6. Right hand 3rd finger ganglion cyst:   Seen by ortho 3/8/2023. Dx with ganglion cyst. Pt not ready for surgery  7. Leukopenia: had f/u visit at [place]. Seen by hematology for leukopenia in the past. No further workup per them at that time  8. R shoulder pain: reports since Nataly puga has been carrying instruments and thinks pulled a muscle       Review of Systems   Constitutional:  Negative for chills and fever.   HENT:  Negative for congestion and sore throat.    Eyes:  Negative for pain and visual disturbance.   Respiratory:  Negative for cough and shortness of breath.    Cardiovascular:  Negative for chest pain and leg swelling.   Gastrointestinal:  Negative for abdominal pain, nausea and vomiting.   Endocrine: Negative for polydipsia and polyuria.   Genitourinary:  Negative for difficulty urinating and dysuria.   Musculoskeletal:  Positive for arthralgias (R shoulder pain). Negative for back pain.   Skin:  Negative for rash and wound.   Neurological:  Negative for weakness and headaches.   Psychiatric/Behavioral:  Negative for agitation and confusion.    Past medical history, surgical history, and family medical history reviewed and updated as appropriate.    Medications and allergies reviewed.      Objective:     There were no vitals filed for this visit.  There is no height or weight on file to calculate BMI.  Physical Exam  Constitutional:       Appearance: Normal appearance.   HENT:      Head: Normocephalic and atraumatic.   Eyes:      General:         Right eye: No discharge.         Left eye: No discharge.      Conjunctiva/sclera: Conjunctivae normal.   Cardiovascular:      Rate and Rhythm: Normal rate and regular rhythm.      Heart sounds: No murmur heard.  Pulmonary:      Effort: Pulmonary effort is normal.      Breath sounds: Normal breath sounds.   Abdominal:      General: Bowel sounds are normal. There is no distension.      Palpations: Abdomen is soft.      Tenderness: There is no abdominal tenderness.   Musculoskeletal:         General: Normal range of motion.      Cervical back: Normal range of motion and neck supple.      Right lower leg: No edema.      Left lower leg: No edema.   Skin:     General: Skin is warm and dry.   Neurological:      Mental Status: He is alert and oriented to person, place, and time.   Psychiatric:         Mood and Affect: Mood normal.         Behavior: Behavior normal.       Lab Results   Component Value Date    WBC 2.73 (L) 12/09/2021    HGB 11.8 (L) 12/09/2021    HCT 38.3 (L) 12/09/2021     12/09/2021    CHOL 168 12/09/2021    TRIG 47 12/09/2021    HDL 79 (H) 12/09/2021    ALT 15 12/09/2021    AST 16 12/09/2021     12/09/2021    K 4.6 12/09/2021     (H) 12/09/2021    CREATININE 1.1 12/09/2021    BUN 19 12/09/2021    CO2 17 (L) 12/09/2021    TSH 1.637 12/09/2021    PSA <0.01 12/09/2021    HGBA1C 5.6 06/19/2018       Assessment:     1. Essential hypertension    2. Stage 3a chronic kidney disease    3. Mild cognitive impairment    4. Hepatic steatosis    5. Acute pain of right shoulder      Plan:   Brent was seen today for follow-up.    Diagnoses and all orders for this visit:    Essential hypertension  - Stable. Continue home meds       -      Comprehensive Metabolic Panel; Future  -     CBC Auto Differential; Future  -     TSH; Future  -     Vitamin D; Future  -     Lipid Panel; Future  -     Urinalysis; Future  -     Protein / creatinine ratio, urine; Future    Stage 3a chronic kidney disease  - Chronic. Check labs and continue to monitor   -     Comprehensive Metabolic Panel; Future  -     CBC Auto Differential; Future  -     TSH; Future  -     Vitamin D; Future  -     Lipid Panel; Future  -     Urinalysis; Future  -     Protein / creatinine ratio, urine; Future    Mild cognitive impairment  - Chronic. Counseled supportive care including d/c marijuana use. Exercise as tolerate, due crosswords, math puzzles etc     Hepatic steatosis  - Stable. Check labs   -     Comprehensive Metabolic Panel; Future  -     CBC Auto Differential; Future  -     TSH; Future  -     Vitamin D; Future  -     Lipid Panel; Future  -     Urinalysis; Future  -     Protein / creatinine ratio, urine; Future    Acute pain of right shoulder  - likely muscular. Rest and ok to use tylenol prn       Rtc 6 months     Lupe Lezama MD  Internal Medicine    5/8/2023

## 2023-06-01 RX ORDER — LISINOPRIL 5 MG/1
TABLET ORAL
Qty: 90 TABLET | Refills: 1 | Status: SHIPPED | OUTPATIENT
Start: 2023-06-01 | End: 2023-11-21 | Stop reason: SINTOL

## 2023-06-01 NOTE — TELEPHONE ENCOUNTER
Care Due:                  Date            Visit Type   Department     Provider  --------------------------------------------------------------------------------                                EP -                              PRIMARY      MET INTERNAL  Last Visit: 05-      CARE ([facility])   MEDICINE       Lupe  Lise                              EP -                              PRIMARY      [facility] INTERNAL  Next Visit: 11-      CARE ([facility])   [facility]                                                            Last  Test          Frequency    Reason                     Performed    Due Date  --------------------------------------------------------------------------------    CMP.........  12 months..  lisinopriL...............  12- 12-    [facility] Embedded Care Due Messages. Reference number: [id number].   6/01/2023 1:34:24 PM CDT

## 2023-06-01 NOTE — TELEPHONE ENCOUNTER
Refill Routing Note   Medication(s) are not appropriate for processing by [facility] for the following reason(s):      Required labs outdated    [facility] action(s):  Defer Labs due            Appointments  past 12m or future 3m with PCP    Date Provider   Last Visit   5/8/2023 Lupe Lezama MD   Next Visit   11/8/2023 Lupe Lezama MD   ED visits in past 90 days: 0        Note composed:2:15 PM 06/01/2023

## 2023-06-12 ENCOUNTER — OFFICE VISIT (OUTPATIENT)
Dept: PODIATRY | Facility: CLINIC | Age: 88
End: 2023-06-12
Payer: MEDICARE

## 2023-06-12 VITALS
HEIGHT: 64 IN | BODY MASS INDEX: 21.56 KG/M2 | WEIGHT: 126.31 LBS | HEART RATE: 85 BPM | SYSTOLIC BLOOD PRESSURE: 136 MMHG | DIASTOLIC BLOOD PRESSURE: 77 MMHG

## 2023-06-12 DIAGNOSIS — B35.1 ONYCHOMYCOSIS DUE TO DERMATOPHYTE: Primary | ICD-10-CM

## 2023-06-12 PROCEDURE — 99999 PR PBB SHADOW E&M-EST. PATIENT-LVL IV: ICD-10-PCS | Mod: PBBFAC,,, | Performed by: PODIATRIST

## 2023-06-12 PROCEDURE — 1157F ADVNC CARE PLAN IN RCRD: CPT | Mod: CPTII,S$GLB,, | Performed by: PODIATRIST

## 2023-06-12 PROCEDURE — 1159F PR MEDICATION LIST DOCUMENTED IN MEDICAL RECORD: ICD-10-PCS | Mod: CPTII,S$GLB,, | Performed by: PODIATRIST

## 2023-06-12 PROCEDURE — 99213 OFFICE O/P EST LOW 20 MIN: CPT | Mod: S$GLB,,, | Performed by: PODIATRIST

## 2023-06-12 PROCEDURE — 1157F PR ADVANCE CARE PLAN OR EQUIV PRESENT IN MEDICAL RECORD: ICD-10-PCS | Mod: CPTII,S$GLB,, | Performed by: PODIATRIST

## 2023-06-12 PROCEDURE — 1160F PR REVIEW ALL MEDS BY PRESCRIBER/CLIN PHARMACIST DOCUMENTED: ICD-10-PCS | Mod: CPTII,S$GLB,, | Performed by: PODIATRIST

## 2023-06-12 PROCEDURE — 1126F AMNT PAIN NOTED NONE PRSNT: CPT | Mod: CPTII,S$GLB,, | Performed by: PODIATRIST

## 2023-06-12 PROCEDURE — 1159F MED LIST DOCD IN RCRD: CPT | Mod: CPTII,S$GLB,, | Performed by: PODIATRIST

## 2023-06-12 PROCEDURE — 1160F RVW MEDS BY RX/DR IN RCRD: CPT | Mod: CPTII,S$GLB,, | Performed by: PODIATRIST

## 2023-06-12 PROCEDURE — 99213 PR OFFICE/OUTPT VISIT, EST, LEVL III, 20-29 MIN: ICD-10-PCS | Mod: S$GLB,,, | Performed by: PODIATRIST

## 2023-06-12 PROCEDURE — 1126F PR PAIN SEVERITY QUANTIFIED, NO PAIN PRESENT: ICD-10-PCS | Mod: CPTII,S$GLB,, | Performed by: PODIATRIST

## 2023-06-12 PROCEDURE — 99999 PR PBB SHADOW E&M-EST. PATIENT-LVL IV: CPT | Mod: PBBFAC,,, | Performed by: PODIATRIST

## 2023-06-12 RX ORDER — ALENDRONATE SODIUM 70 MG/1
70 TABLET ORAL
COMMUNITY
Start: 2022-11-29 | End: 2024-03-28 | Stop reason: SDUPTHER

## 2023-06-12 RX ORDER — CHLORHEXIDINE GLUCONATE ORAL RINSE 1.2 MG/ML
SOLUTION DENTAL
COMMUNITY
Start: 2023-06-09

## 2023-06-12 RX ORDER — AMLODIPINE BESYLATE 2.5 MG/1
1 TABLET ORAL DAILY
COMMUNITY
Start: 2022-08-02 | End: 2023-08-29 | Stop reason: SDUPTHER

## 2023-06-12 RX ORDER — DICLOFENAC SODIUM 10 MG/G
2 GEL TOPICAL
COMMUNITY
Start: 2022-12-07 | End: 2023-11-08 | Stop reason: SDUPTHER

## 2023-06-12 RX ORDER — CHLORHEXIDINE GLUCONATE ORAL RINSE 1.2 MG/ML
15 SOLUTION DENTAL
COMMUNITY
Start: 2023-06-09 | End: 2023-06-23

## 2023-06-12 NOTE — PATIENT INSTRUCTIONS
Athletes Foot     Athletes Foot is caused by a fungal infection in the skin. It affects the skin between the toes where it causes fissures (cracks in the skin). It can also affect the bottom of the foot where it causes dry white scales and peeling of the skin. This infection is more likely to occur when the foot is in hot, sweaty socks and shoes for long periods of time.   This infection is treated with skin creams or oral medicine.     Home Care:   It is important to keep the feet dry. Use absorbent cotton socks and change them if they become sweaty; or wear an open-toe shoe or sandal. Wash the feet at least once a day with Nizoral shampoo or dial antibacterial soap and water then dry completely. Avoid soaking and prolonged exposure to water  Rotate your shoes. If you must wear the same shoes everyday then spray the shoes with lysol or antifungal spray and allow that to dry overnight before wearing the shoes again  Apply the antifungal cream as prescribed. Some antifungal creams are available without a prescription (Lotrimin, Tinactin).   It may take 2 weeks before the rash starts to improve and it can take about three to ten weeks to completely clear. Continue the medicine until the rash is all gone.   Use over-the-counter antifungal powders or sprays on your feet after exposure to high-risk environments (public showers, gyms and locker rooms) may prevent future infections. You may wish to use appropriate footwear to reduce exposure.  Clean tubs and bathroom floor with bleach    Follow Up   with your doctor as recommended by our staff if the rash is not starting to improve after TEN days of treatment, or if the rash continues to spread.     Get Prompt Medical Attention   if any of the following occur:   Increasing redness or swelling of the foot   Pus draining from cracks in the skin   Fever of 100.4ºF (38ºC) or higher, or as directed by your healthcare provider    © 4932-8818 Dominique Littlejohn, [address] [place] [address]  [place] [address]. All rights reserved. This information is not intended as a substitute for professional medical care. Always follow your healthcare professional's instructions.               Understanding Thickened Nails    There are several causes of very thick or crumbling nails. They can be caused by injuries or pressure from shoes. Fungal infections are a common cause. Diabetes, psoriasis, or vascular disease are other possible causes.  Symptoms  Along with thickening, the nail may appear ridged, brittle, or yellowish. It may also feel painful when pressure is put on it. After a while, a thickened nail may loosen and fall off.  Evaluation  Because thickened nails may be a symptom of a medical condition, your healthcare provider will look at your medical history. A test may be done to check for fungal infection. The thickness and color of the nail are examined carefully. This helps determine the cause.  Treatment  For infection: Oral or topical antifungal medicines may be used to treat infection. These can help prevent sores under the nail. They also keep the fungus from spreading to other nails.  For thick nails not caused by infection: Thinning the nail may be an option. This can be done by trimming, filing, or grinding.  For pain: If the nail causes pain, part or all of the nail can be removed with surgery. Never try to remove a nail by yourself.  Prevention  Many nail problems can be prevented by wearing the right shoes and trimming your nails properly. Keep your feet clean and dry to help avoid infection. If you have diabetes, talk with your healthcare provider before doing any foot self-care.  The right shoes: Get your feet measured. Your size may change as you age. This is due to ligaments that loosen with age and allow the bones in your foot to change position or spread. Wear shoes that are supportive and roomy enough for your toes to wiggle. Look for shoes made of natural materials, such as  leather, which allow your feet to breathe.  Proper trimming: To avoid problems, trim your toenails straight across. Then file the edges with a nail file. If you cant trim your own nails, ask your healthcare provider to do so for you.        Wearing Proper Shoes                    You walk on your feet every day, forcing them to support the weight of your body. Repeated stress on your feet can cause damage over time. The right shoes can help protect your feet. The wrong shoes can cause more foot problems. Read the information below to help you find a shoe that fits your foot needs.     A good shoe fit will cover your foot outline.       A shoe that doesnt cover the outline is a bad fit.      Whats Your Foot Shape?  To get a good fit, you need to know the shape of your foot. Do this simple test: While standing, place your foot on a piece of paper and trace around it. Is your foot straight or curved? Do you have a foot problem, such as a bunion, that causes your foot outline to show a bulge on the side of your big toe?  Finding Your Fit  Bring your foot outline to the shore store to help you find the right shoe. Place a shoe you like on top of the outline to see if it matches the shape. The shoe should cover the outline. (If you have a bunion, the shoe may not cover the bulge on the outline. Look for soft leather shoes to stretch over the bunion.) Once youve found a pair of proper shoes, put them on. Walk around. Be sure the shoes dont rub or pinch. If the shoes feel good, youve found your fit!  The Right Shoe for You  A good shoe has features that provide comfort and support. It must also be the right size and shape for your feet. Look for a shoe made of breathable fabric and lining, such as leather or canvas. Be sure that shoes have enough tread to prevent slipping. Go to a good shoe store for help finding the right shoe.  Good Shoe Features  An ideal shoe has the following:  Laces for support. If tying laces  is a problem for you, try shoes with Velcro fasteners or rusty.  A front of the shoe (toe box) with ½ inch space in front of your longest toes.  An arch shape that supports your foot.  No more than 1½ inches of heel.  A stiff, snug back of the shoe to keep your foot from sliding around.  A smooth lining with no rough seams.  Shoe Shopping Tips  Below are some dos and donts for when you go to the shoe store.  Do:  Select the shoes that feel right. Wear them around the house. Then bring them to your foot doctor to check for fit. If they dont fit well, return them.  Shop late in the day, when your feet will be slightly bigger.  Each time you buy shoes, have both your feet measured while you are standing. Foot size changes with time.  Pick shoes to suit their purpose. High heels are okay for an occasional night on the town. But for everyday wear, choose a more sensible shoe.  Try on shoes while wearing any inserts specially made for your feet (orthoses).  Try on both the right and left shoes. If your feet are different sizes, pick a pair that fits the larger foot.  Dont:  Dont buy shoes based on shoe size alone. Always try on shoes, as sizes differ from brand to brand and within brands.  Dont expect shoes to break in. If they dont fit at the store, dont buy them.  Dont buy a shoe that doesnt match your foot shape.  What About Socks?  Always wear socks with shoes. Socks help absorb sweat and reduce friction and blistering. When shopping for shoes, choose soft, padded socks with seams that dont irritate your feet.  If You Have Foot Problems  Some foot problems cause deformities. This can make it hard to find a good fit. Look for shoes made of soft leather to stretch over the deformity. If you have bunions, buy shoes with a wider toe box. To fit hammertoes, look for shoes with a tall toe box. If you have arch problems, you may need inserts. In some cases, youll need to have custom footwear or orthoses made  for your feet.  Suggested Footwear  Ask your healthcare provider what kind of footwear you need. He or she may recommend a certain brand or shoe store.  © 6103-0432 [employer]. [address] [place] [address], [place] [address]. All rights reserved. This information is not intended as a substitute for professional medical care. Always follow your healthcare professional's instructions.      Miscellaneous Foot info:    Supplements for inflammation: Arnica Tabs, bromelain with tumeric, alpha lipoic acid, garlic     Over the counter pain creams: Biofreeze, Bengay, tiger balm, two old goat, lidocaine gel,  Absorbine Veterinary Liniment Gel Topical Analgesic Sore Muscle and Joint Pain Relief    Recommend lotions: eucerin, eucerin for diabetics, aquaphor, A&D ointment, gold bond for diabetics, sween, Yung's Bees all purpose baby ointment,  urea 40 with aloe (found on amazon.com)    Shoe recommendations: (try 6pm.Fundly, zapposAVOB , [employer], or shoes.Fundly for discounted prices) you can visit DSW shoes in [place]  or [employer] [facility] in the [facility] (there are also several shoe brand outlets in the [facility])    Asics (GT 2000 or gel foundations), new balance stability type shoes (such as the 940 series), saucony (stabil c3),  Boswell (GTS or Beast or transcend), propet, Maverick, Hoka One Bondi 7 (tennis shoe)    Sofft Brand, baretraps (women) Flaca&Reuben (men), clarks, crocs, aerosoles, naturalizers, SAS, ecco, born, sho bowser, rockports (dress shoes)    Vionic, burkenstocks, fitflops, propet, baretraps (sandals)    HokaOne slide sandals, crocs, propet (house shoes)    Nail Home remedy:  Vicks Vapor rub or Emuaid to nails for easier manageability      Toe Extension (Flexibility)    These instructions are for your right foot. Switch sides for your left foot.  Sit in a chair. Rest your right ankle on your left knee.  Hold your toes with your right hand. Gently bend the toes backward. Feel a stretch in the  undersides of the toes and ball of the foot. Hold for 30 to 60 seconds.  Then gently bend the toes in the other direction. Gently press on them until your foot is pointed. Hold for 30 to 60 seconds.  Repeat 5 times, or as instructed.  © 6686-5066 [employer]. [address] [place] [address], [place] [address]. All rights reserved. This information is not intended as a substitute for professional medical care. Always follow your healthcare professional's instructions.

## 2023-06-13 NOTE — PROGRESS NOTES
Subjective:      Patient ID: Brent Alatorre is a 89 y.o. male.    Chief Complaint:   Nail Problem (Bilateral Big toes)    Brent is a 89 y.o. male who returns to the clinic f/u fungal nails left big toe.   [place] he went to the wrong location.  He relates no new complaints with his toenail but that it seemTo still be fixed and pain-free    Patient is going to the [facility] after this.  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 He discusses his 89th upcoming birthday      Pt over 30 min late for his appt. Still seen *    6/12/2022 patient returns to podiatry for bilateral hallux nail thickness and discoloration. Previously seen by my colleague, new to me. Treated intermittently with ciclopirox. No pain, no drainage, just concerned about appearance.     Past Medical History:   Diagnosis Date    Anemia     Bladder cancer     Cancer     bladder    Cataracts, bilateral     Colon polyp     Glaucoma suspect of both eyes     History of kidney problems     Hypertension     Primary osteoarthritis of both hands 11/29/2017     Past Surgical History:   Procedure Laterality Date    BLADDER AUGMENTATION  1995    bladder cancer removal and colon resection prostatectomy     BLADDER SURGERY      cystectomy    COLON SURGERY  1995    CYSTOSCOPY      HERNIA REPAIR      PROSTATE SURGERY  1995     Current Outpatient Medications on File Prior to Visit   Medication Sig Dispense Refill    acetaminophen (TYLENOL) 500 MG tablet Take 500 mg by mouth as needed.      alendronate (FOSAMAX) 70 MG tablet Take 70 mg by mouth.      amLODIPine (NORVASC) 2.5 MG tablet Take 1 tablet (2.5 mg total) by mouth once daily. 90 tablet 3    amLODIPine (NORVASC) 2.5 MG tablet Take 1 tablet by mouth once daily.      chlorhexidine (PERIDEX) 0.12 % solution SMARTSIG:15 Milliliter(s) By Mouth Morning-Night      chlorhexidine (PERIDEX) 0.12 % solution 15 mLs.      diclofenac sodium (VOLTAREN) 1 % Gel Apply 2 g topically 3 (three) times daily. 200 g 11    diclofenac  sodium (VOLTAREN) 1 % Gel Apply 2 g topically.      ergocalciferol (ERGOCALCIFEROL) 50,000 unit Cap Take 50,000 Units by mouth every 7 days.      ferrous sulfate 325 (65 FE) MG EC tablet Take by mouth.      lisinopriL (PRINIVIL,ZESTRIL) 5 MG tablet TAKE 1 TABLET(5 MG) BY MOUTH EVERY DAY 90 tablet 1    multivitamin capsule Take 1 capsule by mouth once daily.      sodium bicarbonate 650 MG tablet TAKE 1 TABLET(650 MG) BY MOUTH TWICE DAILY 180 tablet 3    solifenacin (VESICARE) 10 MG tablet TAKE 1 TABLET(10 MG) BY MOUTH EVERY DAY 90 tablet 3    ciclopirox (PENLAC) 8 % Soln Apply topically nightly. Remove every 7 days with nail polish remover (Patient not taking: Reported on 5/8/2023) 1 Bottle 0     No current facility-administered medications on file prior to visit.     Review of patient's allergies indicates:   Allergen Reactions    Sulfa (sulfonamide antibiotics)      Sulfur makes pt sick.       Review of Systems   Constitutional: Negative for chills, decreased appetite, fever, malaise/fatigue, night sweats, weight gain and weight loss.   HENT:  Positive for hearing loss.    Eyes:  Positive for visual disturbance.   Cardiovascular:  Negative for chest pain, claudication, dyspnea on exertion, leg swelling, palpitations and syncope.   Respiratory:  Negative for cough and shortness of breath.    Endocrine: Negative for cold intolerance and heat intolerance.   Hematologic/Lymphatic: Negative for bleeding problem. Does not bruise/bleed easily.   Skin:  Positive for dry skin and nail changes. Negative for color change, flushing, itching, poor wound healing, rash, skin cancer, suspicious lesions and unusual hair distribution.   Musculoskeletal:  Positive for joint pain, myalgias and stiffness. Negative for back pain, falls, gout, joint swelling, muscle cramps and muscle weakness.   Gastrointestinal:  Negative for diarrhea, nausea and vomiting.   Neurological:  Negative for dizziness, focal weakness, light-headedness,  "numbness, paresthesias, tremors, vertigo and weakness.   Psychiatric/Behavioral:  Negative for altered mental status and depression. The patient does not have insomnia.    Allergic/Immunologic: Negative.          Objective:       Vitals:    06/12/23 0823   BP: 136/77   Pulse: 85   Weight: 57.3 kg (126 lb 5.2 oz)   Height: 5' 4" (1.626 m)   PainSc: 0-No pain   57.3 kg (126 lb 5.2 oz)     Physical Exam  Vitals reviewed.   Constitutional:       General: He is not in acute distress.     Appearance: He is well-developed. He is not ill-appearing, toxic-appearing or diaphoretic.      Comments: Proper supportive shoegear   Cardiovascular:      Pulses:           Dorsalis pedis pulses are 2+ on the right side and 2+ on the left side.        Posterior tibial pulses are 1+ on the right side and 1+ on the left side.   Musculoskeletal:      Right lower leg: No edema.      Left lower leg: No edema.      Right foot: Decreased range of motion. Deformity and bunion present. No tenderness or bony tenderness.      Left foot: Decreased range of motion. Deformity, bunion and tenderness present. No bony tenderness.      Comments: Decreased first MPJ range of motion both weightbearing and nonweightbearing, no crepitus observed the first MP joint, + dorsal flag sign.    Feet:      Right foot:      Protective Sensation: 10 sites tested.  10 sites sensed.      Skin integrity: No ulcer, blister, skin breakdown, erythema, warmth, callus or dry skin.      Toenail Condition: Right toenails are abnormally thick. Fungal disease present.     Left foot:      Protective Sensation: 10 sites tested.  10 sites sensed.      Skin integrity: No ulcer, blister, skin breakdown, erythema, warmth, callus or dry skin.      Toenail Condition: Left toenails are abnormally thick. Fungal disease present.     Comments: SWM intact  Skin:     General: Skin is warm and dry.      Capillary Refill: Capillary refill takes 2 to 3 seconds.      Coloration: Skin is not pale. "      Findings: No erythema or rash.      Nails: There is no clubbing.      Comments: Hallux toenails bilaterally are elongated by thickened by 2-3 mm, discolored/yellowed, dystrophic, brittle with subungual debris.   Neurological:      Mental Status: He is alert and oriented to person, place, and time.      Sensory: No sensory deficit.      Gait: Gait abnormal.   Psychiatric:         Attention and Perception: Attention normal.         Mood and Affect: Mood normal.         Behavior: Behavior normal.         Thought Content: Thought content normal.         Cognition and Memory: Memory is impaired.         Judgment: Judgment normal.      Comments: Hearing issues             Assessment:       Encounter Diagnosis   Name Primary?    Onychomycosis due to dermatophyte Yes         Plan:       Brent was seen today for nail problem.    Diagnoses and all orders for this visit:    Onychomycosis due to dermatophyte      I counseled the patient on his conditions, their implications and medical management.    Discussed all treatment options such as topical, oral, laser treatments vs surgical removal of nail permanent vs non-permanent in detail pertaining to nail fungus. Instructed patient on the importance of keeping fungus off of skin while treating nails. Patient instructed to use absorbent cotton socks and change them if they become sweaty; or wear an open-toe shoe or sandal. Wash the feet at least once a day with soap and water. Patient wants to continue topical.  Instructed patient that it takes time for symptoms to completely dissipate. Patient instructed to use lysol or over-the-counter antifungal powders or sprays to shoes daily and allow them to air dry, switching shoes from every other day would be optimal. Patient is to avoid barefoot walking in  high-risk environments (public showers, gyms and locker rooms) may prevent future infections.      Based on clinical exam this patient does not qualify for foot care. Patients  who qualify for nail care are usually as follows: diabetic with neurological manifestations, PVD, pernicious anemia, or CKD with appropriate modifiers that indicate high amputation risk (evidence of decreased perfusion, previous amputation, loss of protective sensation,etc).     Rtc prn

## 2023-07-21 ENCOUNTER — OFFICE VISIT (OUTPATIENT)
Dept: URGENT CARE | Facility: CLINIC | Age: 88
End: 2023-07-21
Payer: MEDICARE

## 2023-07-21 VITALS
DIASTOLIC BLOOD PRESSURE: 59 MMHG | TEMPERATURE: 99 F | RESPIRATION RATE: 16 BRPM | SYSTOLIC BLOOD PRESSURE: 93 MMHG | WEIGHT: 126 LBS | HEIGHT: 64 IN | BODY MASS INDEX: 21.51 KG/M2 | HEART RATE: 100 BPM | OXYGEN SATURATION: 95 %

## 2023-07-21 DIAGNOSIS — U07.1 COVID-19: Primary | ICD-10-CM

## 2023-07-21 DIAGNOSIS — U07.1 COVID-19 VIRUS DETECTED: ICD-10-CM

## 2023-07-21 DIAGNOSIS — R05.1 ACUTE COUGH: ICD-10-CM

## 2023-07-21 DIAGNOSIS — R09.82 PND (POST-NASAL DRIP): ICD-10-CM

## 2023-07-21 LAB
CTP QC/QA: YES
SARS-COV-2 AG RESP QL IA.RAPID: POSITIVE

## 2023-07-21 PROCEDURE — 99214 OFFICE O/P EST MOD 30 MIN: CPT | Mod: S$GLB,,, | Performed by: NURSE PRACTITIONER

## 2023-07-21 PROCEDURE — 99214 PR OFFICE/OUTPT VISIT, EST, LEVL IV, 30-39 MIN: ICD-10-PCS | Mod: S$GLB,,, | Performed by: NURSE PRACTITIONER

## 2023-07-21 PROCEDURE — 87811 SARS CORONAVIRUS 2 ANTIGEN POCT, MANUAL READ: ICD-10-PCS | Mod: QW,S$GLB,, | Performed by: NURSE PRACTITIONER

## 2023-07-21 PROCEDURE — 87811 SARS-COV-2 COVID19 W/OPTIC: CPT | Mod: QW,S$GLB,, | Performed by: NURSE PRACTITIONER

## 2023-07-21 RX ORDER — NIRMATRELVIR AND RITONAVIR 150-100 MG
1 KIT ORAL 2 TIMES DAILY
Qty: 10 TABLET | Refills: 0 | Status: SHIPPED | OUTPATIENT
Start: 2023-07-21 | End: 2023-07-26

## 2023-07-21 RX ORDER — IPRATROPIUM BROMIDE 21 UG/1
1 SPRAY, METERED NASAL 2 TIMES DAILY
Qty: 30 ML | Refills: 0 | Status: SHIPPED | OUTPATIENT
Start: 2023-07-21 | End: 2023-07-28

## 2023-07-21 NOTE — PROGRESS NOTES
"Subjective:      Patient ID: Brent Alatorre is a 89 y.o. male.    Vitals:  height is 5' 4" (1.626 m) and weight is 57.2 kg (126 lb). His oral temperature is 98.7 °F (37.1 °C). His blood pressure is 93/59 (abnormal) and his pulse is 100. His respiration is 16 and oxygen saturation is 95%.     Chief Complaint: Cough    This is a 89 y.o. male who presents today with a chief complaint of  cough x 4 day.    Provider note begins below:    Patient comes to the clinic today with complaint of cough x4 days.  Minimally productive.  No difficulty breathing or shortness of breath.  No fever chills but perhaps some fatigue.  Spouse is also ill with sinus/chest congestion.    Cough  This is a recurrent problem. The current episode started in the past 7 days. The problem has been unchanged. The problem occurs constantly. The cough is Non-productive. Associated symptoms include chest pain. Pertinent negatives include no chills, ear congestion, ear pain, fever, headaches, heartburn, hemoptysis, myalgias, nasal congestion, postnasal drip, rash, rhinorrhea, sore throat, shortness of breath, sweats, weight loss or wheezing. Nothing aggravates the symptoms. Risk factors for lung disease include occupational exposure. He has tried nothing for the symptoms. The treatment provided no relief. There is no history of asthma, bronchiectasis, bronchitis, COPD, emphysema, environmental allergies or pneumonia.     Constitution: Negative for chills and fever.   HENT:  Negative for ear pain, postnasal drip and sore throat.    Cardiovascular:  Positive for chest pain.   Respiratory:  Positive for cough. Negative for bloody sputum, shortness of breath and wheezing.    Gastrointestinal:  Negative for heartburn.   Musculoskeletal:  Negative for muscle ache.   Skin:  Negative for rash.   Allergic/Immunologic: Negative for environmental allergies.   Neurological:  Negative for headaches.    Objective:     Physical Exam   Constitutional: He is oriented " to person, place, and time. He appears well-developed. He is cooperative.  Non-toxic appearance. He does not appear ill. No distress.   HENT:   Head: Normocephalic and atraumatic.   Ears:   Right Ear: Hearing, tympanic membrane, external ear and ear canal normal.   Left Ear: Hearing, tympanic membrane, external ear and ear canal normal.   Nose: Nose normal. No mucosal edema, rhinorrhea or nasal deformity. No epistaxis. Right sinus exhibits no maxillary sinus tenderness and no frontal sinus tenderness. Left sinus exhibits no maxillary sinus tenderness and no frontal sinus tenderness.   Mouth/Throat: Uvula is midline, oropharynx is clear and moist and mucous membranes are normal. No trismus in the jaw. Normal dentition. No uvula swelling. Cobblestoning present. No oropharyngeal exudate, posterior oropharyngeal edema or posterior oropharyngeal erythema.   Eyes: Conjunctivae and lids are normal. No scleral icterus.   Neck: Trachea normal and phonation normal. Neck supple. No edema present. No erythema present. No neck rigidity present.   Cardiovascular: Normal rate, regular rhythm, normal heart sounds and normal pulses.   Pulmonary/Chest: Effort normal and breath sounds normal. No stridor. No respiratory distress. He has no decreased breath sounds. He has no wheezes. He has no rhonchi. He has no rales.   Abdominal: Normal appearance.   Musculoskeletal: Normal range of motion.         General: No deformity. Normal range of motion.   Neurological: He is alert and oriented to person, place, and time. He exhibits normal muscle tone. Coordination normal.   Skin: Skin is warm, dry, intact, not diaphoretic and not pale.   Psychiatric: His speech is normal and behavior is normal. Judgment and thought content normal.   Nursing note and vitals reviewed.  Results for orders placed or performed in visit on 07/21/23   SARS Coronavirus 2 Antigen, POCT Manual Read   Result Value Ref Range    SARS Coronavirus 2 Antigen Positive (A)  Negative     Acceptable Yes        Assessment:     1. COVID-19    2. Acute cough    3. PND (post-nasal drip)        Plan:     Labs ordered at this visit reviewed.     I explained the quarantine process per CDC guidelines and patient acknowledged complete understanding and agrees to plan.    I discussed the antiviral medication Paxlovid with the patient.  The patient accepted this medication at this time. I explained the medication interaction with amlodipine and that he may need to discontinue this medication due to lower B/P reading in office while taking this medication.    COVID-19  -     nirmatrelvir-ritonavir (PAXLOVID) 150-100 mg DsPk; Take 1 each by mouth 2 (two) times a day. for 5 days  Dispense: 10 tablet; Refill: 0    Acute cough  -     SARS Coronavirus 2 Antigen, POCT Manual Read    PND (post-nasal drip)  -     ipratropium (ATROVENT) 21 mcg (0.03 %) nasal spray; 1 spray by Each Nostril route 2 (two) times daily. for 7 days  Dispense: 30 mL; Refill: 0

## 2023-07-23 ENCOUNTER — NURSE TRIAGE (OUTPATIENT)
Dept: ADMINISTRATIVE | Facility: CLINIC | Age: 88
End: 2023-07-23
Payer: COMMERCIAL

## 2023-07-23 NOTE — TELEPHONE ENCOUNTER
Pt responded to [facility] text message. Wife responded to text message states pt is doing better, and denies needs at this time. Advised to call back for further concerns.  Reason for Disposition   Health Information question, no triage required and triager able to answer question    Protocols used: Information Only Call - No Triage-A-

## 2023-07-24 ENCOUNTER — TELEPHONE (OUTPATIENT)
Dept: INTERNAL MEDICINE | Facility: CLINIC | Age: 88
End: 2023-07-24
Payer: COMMERCIAL

## 2023-07-28 ENCOUNTER — OFFICE VISIT (OUTPATIENT)
Dept: URGENT CARE | Facility: CLINIC | Age: 88
End: 2023-07-28
Payer: MEDICARE

## 2023-07-28 VITALS
TEMPERATURE: 98 F | RESPIRATION RATE: 16 BRPM | BODY MASS INDEX: 21.51 KG/M2 | HEIGHT: 64 IN | DIASTOLIC BLOOD PRESSURE: 74 MMHG | WEIGHT: 126 LBS | SYSTOLIC BLOOD PRESSURE: 113 MMHG | HEART RATE: 78 BPM | OXYGEN SATURATION: 96 %

## 2023-07-28 DIAGNOSIS — R42 LIGHTHEADEDNESS: Primary | ICD-10-CM

## 2023-07-28 PROCEDURE — 99213 PR OFFICE/OUTPT VISIT, EST, LEVL III, 20-29 MIN: ICD-10-PCS | Mod: S$GLB,,,

## 2023-07-28 PROCEDURE — 99213 OFFICE O/P EST LOW 20 MIN: CPT | Mod: S$GLB,,,

## 2023-07-28 RX ORDER — NIRMATRELVIR AND RITONAVIR 300-100 MG
2 KIT ORAL 2 TIMES DAILY
COMMUNITY
Start: 2023-07-21 | End: 2023-11-08 | Stop reason: ALTCHOICE

## 2023-07-28 RX ORDER — SOLIFENACIN SUCCINATE 10 MG/1
10 TABLET, FILM COATED ORAL DAILY
COMMUNITY
Start: 2022-10-28 | End: 2023-11-08 | Stop reason: SDUPTHER

## 2023-07-28 RX ORDER — LISINOPRIL 5 MG/1
5 TABLET ORAL DAILY
COMMUNITY
Start: 2023-06-01 | End: 2023-11-08 | Stop reason: SDUPTHER

## 2023-07-28 NOTE — PATIENT INSTRUCTIONS
Check your BP twice per day and make a log. Drink plenty of fluids. You may be dehydrated. Eat nutritious foods. Move positions slowly. You are at risk for falls. Sit back down or lie down if lightheadedness continues.     The recommended daily fluid intake for men is 3.7 liters (seven 16 oz bottles).    Follow up with your PCP for continued symptoms.     Go to the ER if you have a fall with head injury, lightheadedness increases, chest pain, shortness of breath, palpitations, vomiting and unable to tolerate fluids.        Pt ambulated on o2. . 95%     Orman Holstein, RN  03/01/20 [id number]

## 2023-07-28 NOTE — PROGRESS NOTES
"Subjective:      Patient ID: Brent Alatorre is a 89 y.o. male.    Vitals:  height is 5' 4" (1.626 m) and weight is 57.2 kg (126 lb). His oral temperature is 98.4 °F (36.9 °C). His blood pressure is 113/74 and his pulse is 78. His respiration is 16 and oxygen saturation is 96%.     Chief Complaint: Dizziness    Pt is coming in today because he noticed he was lightheaded while walking in the grocery store today. Family with patient reports episodes of lightheadedness x 2-3 days. She reports low BP 1 week ago when diagnosed with COVID. Did not take paxlovid and he did not take BP meds for a few days.  No longer with cough. No fevers, CP, SOB, diaphoresis, N/V/D. She reports he has not been drinking much water lately.     Dizziness:   Chronicity:  New  Onset:  Today  Progression since onset:  Gradually worsening  Frequency:  Constantly  Pain Scale:  2/10  Severity:  Initially severe, but improved  Duration:  Very brief  Dizziness characteristics:  Spacial disorientation  Initial Spell Date and Length:  30 seconds  Frequency of Spells:  Hourly  Duration of Spells:  30 seconds  Aggravated by:  Getting up and position changes    Neurological:  Positive for dizziness.    Objective:     Physical Exam   Constitutional: He is oriented to person, place, and time. He appears well-developed. He is cooperative.  Non-toxic appearance. He does not appear ill. No distress.   HENT:   Head: Normocephalic and atraumatic.   Ears:   Right Ear: Hearing, tympanic membrane, external ear and ear canal normal.   Left Ear: Hearing, tympanic membrane, external ear and ear canal normal.   Nose: Nose normal. No mucosal edema, rhinorrhea or nasal deformity. No epistaxis. Right sinus exhibits no maxillary sinus tenderness and no frontal sinus tenderness. Left sinus exhibits no maxillary sinus tenderness and no frontal sinus tenderness.   Mouth/Throat: Uvula is midline, oropharynx is clear and moist and mucous membranes are normal. Mucous " membranes are moist. No trismus in the jaw. Normal dentition. No uvula swelling. No oropharyngeal exudate or posterior oropharyngeal erythema.   Eyes: Conjunctivae and lids are normal. Pupils are equal, round, and reactive to light. Right eye exhibits no discharge. Left eye exhibits no discharge. No scleral icterus.   Neck: Trachea normal and phonation normal. Neck supple.   Cardiovascular: Normal rate, regular rhythm, normal heart sounds and normal pulses.   Pulmonary/Chest: Effort normal and breath sounds normal. No stridor. No respiratory distress. He has no wheezes. He has no rhonchi. He has no rales.   Abdominal: Normal appearance and bowel sounds are normal. He exhibits no distension and no mass. Soft. There is no abdominal tenderness.   Musculoskeletal: Normal range of motion.         General: No deformity. Normal range of motion.   Lymphadenopathy:     He has no cervical adenopathy.   Neurological: He is alert and oriented to person, place, and time. He exhibits normal muscle tone. Coordination normal.   Skin: Skin is warm, dry, intact, not diaphoretic and not pale.   Psychiatric: His speech is normal and behavior is normal. Judgment and thought content normal.   Nursing note and vitals reviewed.    Assessment:     1. Lightheadedness        Plan:       Lightheadedness  -     Orthostatic vital signs        07/28 1649 standing 113/74 -- -- -- --   07/28 1646 standing 104/63 -- -- -- --   07/28 1645 lying 150/77         Pt has positive orthostatic vitals. He denied lightheadedness upon position changes. I offered IV fluids but he would like to orally hydrate at home. Fam is in agreement of POC. Advised to follow up with PCP soon. Change positions slowly, increase fluids, check BP twice and make a log. Strict ED precautions given.         Discussed results/diagnosis/plan with patient in clinic. Strict precautions given to patient to monitor for worsening signs and symptoms. Advised to follow up with PCP or  specialist.  Explained side effects of medications prescribed with patient and informed him/her to discontinue use if he/she has any side effects and to inform UC or PCP if this occurs. All questions answered. Strict ED verses clinic return precautions stressed and given in depth. Advised if symptoms worsens of fail to improve he/she should go to the Emergency Room. Discharge and follow-up instructions given verbally/printed with the patient who expressed understanding and willingness to comply with my recommendations. Patient voiced understanding and in agreement with current treatment plan. Patient exits the exam room in no acute distress. Conversant and engaged during discharge discussion, verbalized understanding.

## 2023-08-29 DIAGNOSIS — I10 ESSENTIAL HYPERTENSION: ICD-10-CM

## 2023-08-29 RX ORDER — AMLODIPINE BESYLATE 2.5 MG/1
2.5 TABLET ORAL DAILY
Qty: 90 TABLET | Refills: 3 | Status: SHIPPED | OUTPATIENT
Start: 2023-08-29 | End: 2023-11-21

## 2023-08-29 NOTE — TELEPHONE ENCOUNTER
Care Due:                  Date            Visit Type   Department     Provider  --------------------------------------------------------------------------------                                EP -                              PRIMARY      MET INTERNAL  Last Visit: 05-      CARE ([facility])   MEDICINE       Lupe  Lise                              EP -                              PRIMARY      Health system INTERNAL  Next Visit: 11-      CARE ([facility])   [facility]                                                            Last  Test          Frequency    Reason                     Performed    Due Date  --------------------------------------------------------------------------------    CMP.........  12 months..  lisinopriL...............  12- 12-    [facility] Embedded Care Due Messages. Reference number: [id number].   8/29/2023 1:02:15 PM CDT

## 2023-10-24 ENCOUNTER — TELEPHONE (OUTPATIENT)
Dept: DERMATOLOGY | Facility: CLINIC | Age: 88
End: 2023-10-24
Payer: COMMERCIAL

## 2023-10-24 NOTE — TELEPHONE ENCOUNTER
----- Message from Paddy Le sent at 10/24/2023  2:27 PM CDT -----  Regarding: Appt  Contact: [phone] Magui/Wife  Pt wife is calling to check to see if the pt can be scheduled around the same time as her appt on 1/18/2024 for 8:30. Please call No available dates.

## 2023-10-26 ENCOUNTER — TELEPHONE (OUTPATIENT)
Dept: DERMATOLOGY | Facility: CLINIC | Age: 88
End: 2023-10-26
Payer: COMMERCIAL

## 2023-10-26 NOTE — TELEPHONE ENCOUNTER
----- Message from Karishma Gonsalves MA sent at 10/25/2023 10:57 AM CDT -----  Regarding: FW: call back  Contact: pt [phone]  Hi! Didn't know if this person wanted to contact you back.   ----- Message -----  From: Clyde Morse MA  Sent: 10/24/2023   4:58 PM CDT  To: Karishma Gonsalves MA  Subject: FW: call back                                      ----- Message -----  From: Delisa Copeland  Sent: 10/24/2023   4:20 PM CDT  To: [facility] Staff  Subject: call back                                        Missed Callback     Pt returning callback from missed call. Requesting to speak with somebody in   office. Please call.      Caller:Ms Kilgore his wife   Reason for call:she states she is calling [facility] back   Reason appt not scheduled patient wife states to call her number [phone]

## 2023-11-08 ENCOUNTER — OFFICE VISIT (OUTPATIENT)
Dept: INTERNAL MEDICINE | Facility: CLINIC | Age: 88
End: 2023-11-08
Payer: MEDICARE

## 2023-11-08 ENCOUNTER — TELEPHONE (OUTPATIENT)
Dept: INTERNAL MEDICINE | Facility: CLINIC | Age: 88
End: 2023-11-08

## 2023-11-08 VITALS
OXYGEN SATURATION: 97 % | BODY MASS INDEX: 21.22 KG/M2 | RESPIRATION RATE: 18 BRPM | HEIGHT: 64 IN | TEMPERATURE: 97 F | SYSTOLIC BLOOD PRESSURE: 100 MMHG | WEIGHT: 124.31 LBS | DIASTOLIC BLOOD PRESSURE: 60 MMHG | HEART RATE: 70 BPM

## 2023-11-08 DIAGNOSIS — Z00.00 ENCOUNTER FOR PREVENTIVE HEALTH EXAMINATION: Primary | ICD-10-CM

## 2023-11-08 DIAGNOSIS — M81.0 AGE-RELATED OSTEOPOROSIS WITHOUT CURRENT PATHOLOGICAL FRACTURE: ICD-10-CM

## 2023-11-08 DIAGNOSIS — N18.31 STAGE 3A CHRONIC KIDNEY DISEASE: Chronic | ICD-10-CM

## 2023-11-08 DIAGNOSIS — I10 ESSENTIAL HYPERTENSION: Chronic | ICD-10-CM

## 2023-11-08 DIAGNOSIS — K76.0 HEPATIC STEATOSIS: ICD-10-CM

## 2023-11-08 DIAGNOSIS — G31.84 MILD COGNITIVE IMPAIRMENT: ICD-10-CM

## 2023-11-08 DIAGNOSIS — G89.29 CHRONIC LLQ PAIN: ICD-10-CM

## 2023-11-08 DIAGNOSIS — R10.32 CHRONIC LLQ PAIN: ICD-10-CM

## 2023-11-08 PROCEDURE — 1157F PR ADVANCE CARE PLAN OR EQUIV PRESENT IN MEDICAL RECORD: ICD-10-PCS | Mod: CPTII,S$GLB,, | Performed by: HOSPITALIST

## 2023-11-08 PROCEDURE — 1160F RVW MEDS BY RX/DR IN RCRD: CPT | Mod: CPTII,S$GLB,, | Performed by: HOSPITALIST

## 2023-11-08 PROCEDURE — 99999 PR PBB SHADOW E&M-EST. PATIENT-LVL V: ICD-10-PCS | Mod: PBBFAC,,, | Performed by: HOSPITALIST

## 2023-11-08 PROCEDURE — 1126F AMNT PAIN NOTED NONE PRSNT: CPT | Mod: CPTII,S$GLB,, | Performed by: HOSPITALIST

## 2023-11-08 PROCEDURE — 99214 OFFICE O/P EST MOD 30 MIN: CPT | Mod: S$GLB,,, | Performed by: HOSPITALIST

## 2023-11-08 PROCEDURE — 3288F PR FALLS RISK ASSESSMENT DOCUMENTED: ICD-10-PCS | Mod: CPTII,S$GLB,, | Performed by: HOSPITALIST

## 2023-11-08 PROCEDURE — 1157F ADVNC CARE PLAN IN RCRD: CPT | Mod: CPTII,S$GLB,, | Performed by: HOSPITALIST

## 2023-11-08 PROCEDURE — 1101F PT FALLS ASSESS-DOCD LE1/YR: CPT | Mod: CPTII,S$GLB,, | Performed by: HOSPITALIST

## 2023-11-08 PROCEDURE — 1101F PR PT FALLS ASSESS DOC 0-1 FALLS W/OUT INJ PAST YR: ICD-10-PCS | Mod: CPTII,S$GLB,, | Performed by: HOSPITALIST

## 2023-11-08 PROCEDURE — 1159F PR MEDICATION LIST DOCUMENTED IN MEDICAL RECORD: ICD-10-PCS | Mod: CPTII,S$GLB,, | Performed by: HOSPITALIST

## 2023-11-08 PROCEDURE — 99999 PR PBB SHADOW E&M-EST. PATIENT-LVL V: CPT | Mod: PBBFAC,,, | Performed by: HOSPITALIST

## 2023-11-08 PROCEDURE — 1159F MED LIST DOCD IN RCRD: CPT | Mod: CPTII,S$GLB,, | Performed by: HOSPITALIST

## 2023-11-08 PROCEDURE — 1160F PR REVIEW ALL MEDS BY PRESCRIBER/CLIN PHARMACIST DOCUMENTED: ICD-10-PCS | Mod: CPTII,S$GLB,, | Performed by: HOSPITALIST

## 2023-11-08 PROCEDURE — 1126F PR PAIN SEVERITY QUANTIFIED, NO PAIN PRESENT: ICD-10-PCS | Mod: CPTII,S$GLB,, | Performed by: HOSPITALIST

## 2023-11-08 PROCEDURE — 3288F FALL RISK ASSESSMENT DOCD: CPT | Mod: CPTII,S$GLB,, | Performed by: HOSPITALIST

## 2023-11-08 PROCEDURE — 99214 PR OFFICE/OUTPT VISIT, EST, LEVL IV, 30-39 MIN: ICD-10-PCS | Mod: S$GLB,,, | Performed by: HOSPITALIST

## 2023-11-08 NOTE — PROGRESS NOTES
"Subjective:     @Patient ID: Brent Alatorre is a 89 y.o. male.    Chief Complaint: Follow-up (6 month)    HPI    89 yo male presents for annual:      1. Memory: pt has f/u with neurology. Per neuro, likely normal aging.  Last visit Wife reports patient sometimes forgets minor things but nothing major. Has not got lost or forgotten names of close family members.   2. HTN: currently on amlodipine 2.5 mg qday, lisinopril 5 mg qday   3. Decreased appetite: reports ongoing for past few years. Wife states he only eats 1/2 a meal; does drink up to 1 ensure a day. Weight improving now 124 lb   4. B/l hearing loss: Has hearing aids now.    5. CKD3: renal baseline. Had stopped taking sodium bicarb tabs   6. Right hand 3rd finger: reports having large "knot" of finger. Not painful currently. Had xray of this in May. Showed soft tissue swelling.   7. Abdominal pain: reports still has episodes of LLQ.   Had abdominal u/s that showed fatty liver. Ct abd/pelvis in 2022 at [facility] was negative for acute issues   8. Leukopenia: had f/u visit at [facility]. Seen by hematology for leukopenia. No further workup per them at that time       Lipid disorders/ASCVD risk (ages >/= 45 or >/= 20 if increased risk ): ordered  DM (>45y yearly or if obese, HTN): A1c      Eye exam: utd 2021  Colorectal Cancer (normal risk 50-75yr): Colonoscopy n/a  Prostate (per discussion with patient starting at 50y or 45y if high risk):    DEXA (F>66 yo, M >71yo, M&F 50-68 yo with risk factors (smoking, previous fx, wt <70kg; etoh abuse, chronic steroids, RA)):  due      Vaccines:   Influenza (yearly) utd   Tetanus (every 10 yrs - 1st tdap) done  PPSV23(>66yo or <65 w/ lung dz, smoking, DM) done  PCV13 (> 65 or <65 w/ immunocompromised)done   Zoster (>59yo): utd      Exercise:  not much since pandemic, walking   Diet: regular      Review of Systems   Constitutional:  Negative for chills and fever.   HENT:  Negative for congestion and sore throat.    Eyes:  Negative " for pain and visual disturbance.   Respiratory:  Negative for cough and shortness of breath.    Cardiovascular:  Negative for chest pain and leg swelling.   Gastrointestinal:  Positive for abdominal pain. Negative for nausea and vomiting.   Endocrine: Negative for polydipsia and polyuria.   Genitourinary:  Negative for difficulty urinating and dysuria.   Musculoskeletal:  Negative for arthralgias and back pain.   Skin:  Negative for rash and wound.   Neurological:  Negative for weakness and headaches.   Psychiatric/Behavioral:  Negative for agitation and confusion.      Past medical history, surgical history, and family medical history reviewed and updated as appropriate.    Medications and allergies reviewed.     Objective:     There were no vitals filed for this visit.  There is no height or weight on file to calculate BMI.  Physical Exam  Vitals reviewed.   Constitutional:       General: He is not in acute distress.     Appearance: He is well-developed.   HENT:      Head: Normocephalic and atraumatic.      Right Ear: Tympanic membrane normal.      Left Ear: Tympanic membrane normal.      Mouth/Throat:      Mouth: Mucous membranes are moist.      Pharynx: No oropharyngeal exudate.   Eyes:      General:         Right eye: No discharge.         Left eye: No discharge.      Conjunctiva/sclera: Conjunctivae normal.   Cardiovascular:      Rate and Rhythm: Normal rate and regular rhythm.      Heart sounds: No murmur heard.     No friction rub.   Pulmonary:      Effort: Pulmonary effort is normal.      Breath sounds: Normal breath sounds.   Abdominal:      General: Bowel sounds are normal. There is no distension.      Palpations: Abdomen is soft.      Tenderness: There is no abdominal tenderness.   Musculoskeletal:         General: Normal range of motion.      Cervical back: Normal range of motion and neck supple.      Right lower leg: No edema.      Left lower leg: No edema.   Lymphadenopathy:      Cervical: No cervical  adenopathy.   Skin:     General: Skin is warm and dry.   Neurological:      Mental Status: He is alert and oriented to person, place, and time.   Psychiatric:         Mood and Affect: Mood normal.         Behavior: Behavior normal.         Lab Results   Component Value Date    WBC 2.73 (L) 12/09/2021    HGB 11.8 (L) 12/09/2021    HCT 38.3 (L) 12/09/2021     12/09/2021    CHOL 168 12/09/2021    TRIG 47 12/09/2021    HDL 79 (H) 12/09/2021    ALT 15 12/09/2021    AST 16 12/09/2021     12/09/2021    K 4.6 12/09/2021     (H) 12/09/2021    CREATININE 1.1 12/09/2021    BUN 19 12/09/2021    CO2 17 (L) 12/09/2021    TSH 1.637 12/09/2021    PSA <0.01 12/09/2021    HGBA1C 5.6 06/19/2018       Assessment:     1. Encounter for preventive health examination    2. Chronic LLQ pain    3. Age-related osteoporosis without current pathological fracture    4. Essential hypertension    5. Mild cognitive impairment    6. Hepatic steatosis    7. Stage 3a chronic kidney disease      Plan:   Brent was seen today for follow-up.    Diagnoses and all orders for this visit:    Encounter for preventive health examination  - labs ordered    Chronic LLQ pain  - chronic check ct  -     CT Abdomen Pelvis  Without Contrast; Future    Age-related osteoporosis without current pathological fracture  -     DXA Bone Density Axial Skeleton 1 or more sites; Future    Essential hypertension  - Stable. Continue home meds     Mild cognitive impairment  - chronic. Continue to monitor     Hepatic steatosis  - chronic. Continue to monitor     Stage 3a chronic kidney disease  - stable. Continue to monitor     Rtc 6 months     Lupe Lezama MD  Internal Medicine    11/8/2023

## 2023-11-15 ENCOUNTER — HOSPITAL ENCOUNTER (OUTPATIENT)
Dept: RADIOLOGY | Facility: HOSPITAL | Age: 88
Discharge: HOME | End: 2023-11-15
Attending: HOSPITALIST
Payer: MEDICARE

## 2023-11-15 DIAGNOSIS — R10.32 CHRONIC LLQ PAIN: ICD-10-CM

## 2023-11-15 DIAGNOSIS — G89.29 CHRONIC LLQ PAIN: ICD-10-CM

## 2023-11-15 PROCEDURE — 74176 CT ABD & PELVIS W/O CONTRAST: CPT | Mod: TC

## 2023-11-15 PROCEDURE — 74176 CT ABDOMEN PELVIS WITHOUT CONTRAST: ICD-10-PCS | Mod: 26,,, | Performed by: RADIOLOGY

## 2023-11-15 PROCEDURE — 74176 CT ABD & PELVIS W/O CONTRAST: CPT | Mod: 26,,, | Performed by: RADIOLOGY

## 2023-11-16 ENCOUNTER — TELEPHONE (OUTPATIENT)
Dept: INTERNAL MEDICINE | Facility: CLINIC | Age: 88
End: 2023-11-16
Payer: COMMERCIAL

## 2023-11-16 NOTE — TELEPHONE ENCOUNTER
----- Message from Ed Berrios MA sent at 11/16/2023  9:51 AM CST -----  Regarding: ER F/U. Joans appt  Contact: [phone]    ----- Message -----  From: Irma Mejia  Sent: 11/16/2023   9:17 AM CST  To: Lise Andrade Staff      Caller is requesting an earlier appointment then we can schedule.  Caller is requesting a message be sent to the provider.      If this is for urgent care symptoms, did you offer other providers at this location, providers at other locations, or [facility]? (yes, no, n/a):   Went to ER yesterday       If this is for the patients physical, did you offer to schedule next available and put on wait list, or to see NP or PA for their physical?  (yes, no, n/a):  PCP    When is the next available appointment with their provider:  01/06/2023    Reason for the appointment:  f/u ER Patient is calling for Medical Advice regarding: F/u ER Pass out yesterday. Advise not to take his bp medication until see his pcp.           Would the patient like a call back, or a response through their [facility] portal?:   Call back   Comments:

## 2023-11-21 ENCOUNTER — OFFICE VISIT (OUTPATIENT)
Dept: INTERNAL MEDICINE | Facility: CLINIC | Age: 88
End: 2023-11-21
Payer: COMMERCIAL

## 2023-11-21 VITALS
HEART RATE: 62 BPM | WEIGHT: 123.88 LBS | OXYGEN SATURATION: 97 % | DIASTOLIC BLOOD PRESSURE: 70 MMHG | RESPIRATION RATE: 16 BRPM | TEMPERATURE: 97 F | BODY MASS INDEX: 21.15 KG/M2 | SYSTOLIC BLOOD PRESSURE: 134 MMHG | HEIGHT: 64 IN

## 2023-11-21 DIAGNOSIS — I10 ESSENTIAL HYPERTENSION: Primary | Chronic | ICD-10-CM

## 2023-11-21 DIAGNOSIS — N28.1 BILATERAL RENAL CYSTS: ICD-10-CM

## 2023-11-21 DIAGNOSIS — R55 SYNCOPE, UNSPECIFIED SYNCOPE TYPE: ICD-10-CM

## 2023-11-21 DIAGNOSIS — F12.90 MARIJUANA USE: ICD-10-CM

## 2023-11-21 PROCEDURE — 99999 PR PBB SHADOW E&M-EST. PATIENT-LVL V: ICD-10-PCS | Mod: PBBFAC,,, | Performed by: HOSPITALIST

## 2023-11-21 PROCEDURE — 99214 PR OFFICE/OUTPT VISIT, EST, LEVL IV, 30-39 MIN: ICD-10-PCS | Mod: S$GLB,,, | Performed by: HOSPITALIST

## 2023-11-21 PROCEDURE — 1159F MED LIST DOCD IN RCRD: CPT | Mod: CPTII,S$GLB,, | Performed by: HOSPITALIST

## 2023-11-21 PROCEDURE — 3288F FALL RISK ASSESSMENT DOCD: CPT | Mod: CPTII,S$GLB,, | Performed by: HOSPITALIST

## 2023-11-21 PROCEDURE — 3288F PR FALLS RISK ASSESSMENT DOCUMENTED: ICD-10-PCS | Mod: CPTII,S$GLB,, | Performed by: HOSPITALIST

## 2023-11-21 PROCEDURE — 1157F ADVNC CARE PLAN IN RCRD: CPT | Mod: CPTII,S$GLB,, | Performed by: HOSPITALIST

## 2023-11-21 PROCEDURE — 1160F RVW MEDS BY RX/DR IN RCRD: CPT | Mod: CPTII,S$GLB,, | Performed by: HOSPITALIST

## 2023-11-21 PROCEDURE — 1126F AMNT PAIN NOTED NONE PRSNT: CPT | Mod: CPTII,S$GLB,, | Performed by: HOSPITALIST

## 2023-11-21 PROCEDURE — 1160F PR REVIEW ALL MEDS BY PRESCRIBER/CLIN PHARMACIST DOCUMENTED: ICD-10-PCS | Mod: CPTII,S$GLB,, | Performed by: HOSPITALIST

## 2023-11-21 PROCEDURE — 1159F PR MEDICATION LIST DOCUMENTED IN MEDICAL RECORD: ICD-10-PCS | Mod: CPTII,S$GLB,, | Performed by: HOSPITALIST

## 2023-11-21 PROCEDURE — 99214 OFFICE O/P EST MOD 30 MIN: CPT | Mod: S$GLB,,, | Performed by: HOSPITALIST

## 2023-11-21 PROCEDURE — 1101F PT FALLS ASSESS-DOCD LE1/YR: CPT | Mod: CPTII,S$GLB,, | Performed by: HOSPITALIST

## 2023-11-21 PROCEDURE — 1101F PR PT FALLS ASSESS DOC 0-1 FALLS W/OUT INJ PAST YR: ICD-10-PCS | Mod: CPTII,S$GLB,, | Performed by: HOSPITALIST

## 2023-11-21 PROCEDURE — 1126F PR PAIN SEVERITY QUANTIFIED, NO PAIN PRESENT: ICD-10-PCS | Mod: CPTII,S$GLB,, | Performed by: HOSPITALIST

## 2023-11-21 PROCEDURE — 99999 PR PBB SHADOW E&M-EST. PATIENT-LVL V: CPT | Mod: PBBFAC,,, | Performed by: HOSPITALIST

## 2023-11-21 PROCEDURE — 1157F PR ADVANCE CARE PLAN OR EQUIV PRESENT IN MEDICAL RECORD: ICD-10-PCS | Mod: CPTII,S$GLB,, | Performed by: HOSPITALIST

## 2023-11-21 NOTE — PROGRESS NOTES
"Subjective:     @Patient ID: Brent Alatorre is a 89 y.o. male.    Chief Complaint: OTHER (Passed out last week/low b/p)    HPI    89 yo male with DM2, HTN, CKD, memory loss presents for ER f/u. Pt admitted on 11/15/23 after syncopal episode.  Per patient's wife they were at dinner at a friend's house.  Patient has had few glasses of wine which he does not usually drink.  Also she reports that he did smoke a little marijuana.  During the dinner he slumped over.  She reports that EMS was called and upon arrival he was noted to be hypotensive with systolic in the 40s per patient's wife (per ER note in the 60s).  She does report that a few rounds of chest compressions were performed and patient did arouse.  Patient was brought to the emergency room at [place] [facility].  In the ER patient underwent EKG, chest x-ray and CT head.  CT head was negative for any acute issues.  Chest x-ray was stable.  EKG was stable.  Troponin level is negative. Pt told to hold bp medications       Review of Systems   Constitutional:  Negative for chills and fever.   Respiratory:  Negative for shortness of breath.    Cardiovascular:  Negative for chest pain and palpitations.   Neurological:  Negative for dizziness.   Psychiatric/Behavioral:  Negative for agitation and confusion.      Past medical history, surgical history, and family medical history reviewed and updated as appropriate.    Medications and allergies reviewed.     Objective:     Vitals:    11/21/23 0947   BP: 134/70   BP Location: Right arm   Patient Position: Sitting   Pulse: 62   Resp: 16   Temp: 97.4 °F (36.3 °C)   SpO2: 97%   Weight: 56.2 kg (123 lb 14.4 oz)   Height: 5' 4" (1.626 m)     Body mass index is 21.27 kg/m².  Physical Exam  Constitutional:       Appearance: Normal appearance.   HENT:      Head: Normocephalic and atraumatic.   Eyes:      General:         Right eye: No discharge.         Left eye: No discharge.      Conjunctiva/sclera: Conjunctivae normal. "   Cardiovascular:      Rate and Rhythm: Normal rate and regular rhythm.      Heart sounds: No murmur heard.  Pulmonary:      Effort: Pulmonary effort is normal.      Breath sounds: Normal breath sounds.   Musculoskeletal:         General: Normal range of motion.      Cervical back: Normal range of motion and neck supple.      Right lower leg: No edema.      Left lower leg: No edema.   Skin:     General: Skin is warm and dry.   Neurological:      Mental Status: He is alert and oriented to person, place, and time.   Psychiatric:         Mood and Affect: Mood normal.         Behavior: Behavior normal.         Lab Results   Component Value Date    WBC 3.63 (L) 11/15/2023    HGB 10.1 (L) 11/15/2023    HCT 31.2 (L) 11/15/2023     11/15/2023    CHOL 168 12/09/2021    TRIG 47 12/09/2021    HDL 79 (H) 12/09/2021    ALT 12 11/15/2023    AST 12 11/15/2023     11/15/2023    K 3.8 11/15/2023     (H) 11/15/2023    CREATININE 1.2 11/15/2023    BUN 17 11/15/2023    CO2 19 (L) 11/15/2023    TSH 1.637 12/09/2021    PSA <0.01 12/09/2021    HGBA1C 5.6 06/19/2018       Assessment:     1. Essential hypertension    2. Syncope, unspecified syncope type    3. Marijuana use      Plan:   Brent was seen today for other.    Diagnoses and all orders for this visit:    Essential hypertension  - BP currently stable.  Patient told to discontinue current blood pressure medication of amlodipine and lisinopril.  Patient's wife will continue to monitor blood pressure at home.  Will notify MD if blood pressure becomes elevated again.    Syncope, unspecified syncope type  - likely multifactorial in setting of blood pressure medications, alcohol use and marijuana use.  Patient currently improved from incident last week    Marijuana use  - counseled on cessation.         Rtc 6 months           Lupe Lezama MD  Internal Medicine    11/21/2023

## 2023-11-30 ENCOUNTER — OFFICE VISIT (OUTPATIENT)
Dept: PODIATRY | Facility: CLINIC | Age: 88
End: 2023-11-30
Payer: COMMERCIAL

## 2023-11-30 VITALS
HEART RATE: 62 BPM | BODY MASS INDEX: 21.79 KG/M2 | SYSTOLIC BLOOD PRESSURE: 143 MMHG | DIASTOLIC BLOOD PRESSURE: 75 MMHG | WEIGHT: 127.63 LBS | HEIGHT: 64 IN

## 2023-11-30 DIAGNOSIS — B35.1 ONYCHOMYCOSIS DUE TO DERMATOPHYTE: Primary | ICD-10-CM

## 2023-11-30 PROCEDURE — 99499 UNLISTED E&M SERVICE: CPT | Mod: ,,, | Performed by: PODIATRIST

## 2023-11-30 PROCEDURE — 17999 PR NON-COVERED FOOT CARE: ICD-10-PCS | Mod: CSM,S$GLB,, | Performed by: PODIATRIST

## 2023-11-30 PROCEDURE — 99999 PR PBB SHADOW E&M-EST. PATIENT-LVL III: CPT | Mod: PBBFAC,,, | Performed by: PODIATRIST

## 2023-11-30 PROCEDURE — 17999 UNLISTD PX SKN MUC MEMB SUBQ: CPT | Mod: CSM,S$GLB,, | Performed by: PODIATRIST

## 2023-11-30 PROCEDURE — 99999 PR PBB SHADOW E&M-EST. PATIENT-LVL III: ICD-10-PCS | Mod: PBBFAC,,, | Performed by: PODIATRIST

## 2023-11-30 PROCEDURE — 99499 NO LOS: ICD-10-PCS | Mod: ,,, | Performed by: PODIATRIST

## 2023-12-10 NOTE — PROGRESS NOTES
Pt presents for routine non-covered foot care. Pt. does not have high risk feet. Pedal pulses are palpable. Nails are elongated, thickened Bilaterally. Diagnosis is onycomycosis. Nails were reduced Bilaterally. Patient tolerated well and related relief. RTC p.r.n. as PROC B

## 2024-01-03 ENCOUNTER — TELEPHONE (OUTPATIENT)
Dept: NEUROLOGY | Facility: CLINIC | Age: 89
End: 2024-01-03
Payer: MEDICARE

## 2024-01-03 NOTE — TELEPHONE ENCOUNTER
spoke with patient's wife (Magui),confirmed appt. that is scheduled with Dr. Gonsalves on 01/04/24 at 1:00 pm

## 2024-01-04 ENCOUNTER — OFFICE VISIT (OUTPATIENT)
Dept: NEUROLOGY | Facility: CLINIC | Age: 89
End: 2024-01-04
Payer: COMMERCIAL

## 2024-01-04 VITALS
WEIGHT: 125.88 LBS | DIASTOLIC BLOOD PRESSURE: 67 MMHG | HEART RATE: 62 BPM | HEIGHT: 64 IN | SYSTOLIC BLOOD PRESSURE: 136 MMHG | BODY MASS INDEX: 21.49 KG/M2

## 2024-01-04 DIAGNOSIS — H91.8X3 OTHER SPECIFIED HEARING LOSS OF BOTH EARS: ICD-10-CM

## 2024-01-04 DIAGNOSIS — G31.84 MILD COGNITIVE IMPAIRMENT: Primary | ICD-10-CM

## 2024-01-04 PROCEDURE — 1157F ADVNC CARE PLAN IN RCRD: CPT | Mod: CPTII,S$GLB,, | Performed by: STUDENT IN AN ORGANIZED HEALTH CARE EDUCATION/TRAINING PROGRAM

## 2024-01-04 PROCEDURE — 3288F FALL RISK ASSESSMENT DOCD: CPT | Mod: CPTII,S$GLB,, | Performed by: STUDENT IN AN ORGANIZED HEALTH CARE EDUCATION/TRAINING PROGRAM

## 2024-01-04 PROCEDURE — 1101F PT FALLS ASSESS-DOCD LE1/YR: CPT | Mod: CPTII,S$GLB,, | Performed by: STUDENT IN AN ORGANIZED HEALTH CARE EDUCATION/TRAINING PROGRAM

## 2024-01-04 PROCEDURE — 99999 PR PBB SHADOW E&M-EST. PATIENT-LVL IV: CPT | Mod: PBBFAC,,, | Performed by: STUDENT IN AN ORGANIZED HEALTH CARE EDUCATION/TRAINING PROGRAM

## 2024-01-04 PROCEDURE — 1159F MED LIST DOCD IN RCRD: CPT | Mod: CPTII,S$GLB,, | Performed by: STUDENT IN AN ORGANIZED HEALTH CARE EDUCATION/TRAINING PROGRAM

## 2024-01-04 PROCEDURE — 1126F AMNT PAIN NOTED NONE PRSNT: CPT | Mod: CPTII,S$GLB,, | Performed by: STUDENT IN AN ORGANIZED HEALTH CARE EDUCATION/TRAINING PROGRAM

## 2024-01-04 PROCEDURE — 99215 OFFICE O/P EST HI 40 MIN: CPT | Mod: S$GLB,,, | Performed by: STUDENT IN AN ORGANIZED HEALTH CARE EDUCATION/TRAINING PROGRAM

## 2024-01-04 NOTE — PROGRESS NOTES
Chief Complaint and Duration   Mild Cognitive impairment    History of Present Illness     Brent Alatorre is a 89 y.o. male with likely mild cognitive impairment, remains highly independent.  Patient presents with his wife, is able to do his own ADLs the most part, plays music and continues to play chess.  Was evaluated at the [facility] last November with a PET that has not indicative of neurodegenerative disease, recommendation to refrain from acetylcholine esterase inhibitor.  Patient was seeing prior neurologists in is coming here for another opinion.  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  Tries to stay active.  No recent illnesses.    Review of patient's allergies indicates:   Allergen Reactions    Sulfa (sulfonamide antibiotics)      Sulfur makes pt sick.     Current Outpatient Medications   Medication Sig Dispense Refill    acetaminophen (TYLENOL) 500 MG tablet Take 500 mg by mouth as needed.      alendronate (FOSAMAX) 70 MG tablet Take 70 mg by mouth.      chlorhexidine (PERIDEX) 0.12 % solution SMARTSIG:15 Milliliter(s) By Mouth Morning-Night      diclofenac sodium (VOLTAREN) 1 % Gel Apply 2 g topically 3 (three) times daily. 200 g 11    ergocalciferol (ERGOCALCIFEROL) 50,000 unit Cap Take 50,000 Units by mouth every 7 days.      ferrous sulfate 325 (65 FE) MG EC tablet Take by mouth.      multivitamin capsule Take 1 capsule by mouth once daily.      sodium bicarbonate 650 MG tablet TAKE 1 TABLET(650 MG) BY MOUTH TWICE DAILY 180 tablet 3    solifenacin (VESICARE) 10 MG tablet Take 1 tablet (10 mg total) by mouth once daily. 90 tablet 3    ciclopirox (PENLAC) 8 % Soln Apply topically nightly. Remove every 7 days with nail polish remover 1 Bottle 0     No current facility-administered medications for this visit.       Medical History     Past Medical History:   Diagnosis Date    Anemia     Bladder cancer     Cancer     bladder    Cataracts,  bilateral     Colon polyp     Glaucoma suspect of both eyes     History of kidney problems     Hypertension     Primary osteoarthritis of both hands 11/29/2017     Past Surgical History:   Procedure Laterality Date    BLADDER AUGMENTATION  1995    bladder cancer removal and colon resection prostatectomy     BLADDER SURGERY      cystectomy    COLON SURGERY  1995    CYSTOSCOPY      HERNIA REPAIR      PROSTATE SURGERY  1995     Family History   Problem Relation Age of Onset    Diabetes Mother     Cancer Father         prostate cancer    Cancer Brother         stomach cancer     Cancer Brother         lung cancer    Heart disease Neg Hx     Colon polyps Neg Hx     Amblyopia Neg Hx     Blindness Neg Hx     Cataracts Neg Hx     Glaucoma Neg Hx     Macular degeneration Neg Hx     Retinal detachment Neg Hx     Strabismus Neg Hx      Social History     Socioeconomic History    Marital status:      Spouse name: Magui   Occupational History    Occupation: Retired   Tobacco Use    Smoking status: Never     Passive exposure: Never    Smokeless tobacco: Never    Tobacco comments:     does marijuana   Substance and Sexual Activity    Alcohol use: Yes     Alcohol/week: 3.0 standard drinks of alcohol     Types: 3 Cans of beer per week     Comment: very light drinker    Drug use: Yes     Frequency: 4.0 times per week     Types: Marijuana     Comment: Every day    Sexual activity: Not Currently     Partners: Female     Social Determinants of Health     Financial Resource Strain: Low Risk  (1/2/2024)    Overall Financial Resource Strain (CARDIA)     Difficulty of Paying Living Expenses: Not hard at all   Food Insecurity: No Food Insecurity (1/2/2024)    Hunger Vital Sign     Worried About Running Out of Food in the Last Year: Never true     Ran Out of Food in the Last Year: Never true   Transportation Needs: No Transportation Needs (1/2/2024)    PRAPARE - Transportation     Lack of Transportation (Medical): No     Lack of  Transportation (Non-Medical): No   Physical Activity: Unknown (1/2/2024)    Exercise Vital Sign     Days of Exercise per Week: 3 days   Stress: No Stress Concern Present (1/2/2024)    [employer] - Occupational Stress Questionnaire     Feeling of Stress : Not at all   Social Connections: Unknown (1/2/2024)    Social Connection and Isolation Panel [NHANES]     Frequency of Communication with Friends and Family: More than three times a week     Frequency of Social Gatherings with Friends and Family: Three times a week     Attends Club or Organization Meetings: More than 4 times per year     Marital Status:    Housing Stability: Low Risk  (1/2/2024)    Housing Stability Vital Sign     Unable to Pay for Housing in the Last Year: No     Number of Places Lived in the Last Year: 1     Unstable Housing in the Last Year: No       Exam     Vitals:    01/04/24 1300   BP: 136/67   Pulse: 62      Physical Exam:  General: Not in acute distress. Not ill-appearing.   HENT: Normocephalic and atraumatic. Moist mucous membranes.  Eyes: Conjunctivae normal.   Pulmonary: Pulmonary effort is normal.   Abdominal: Abdomen is soft and flat.   Skin: Skin is warm and dry. No rashes.   Psychiatric: Mood normal.        Neurologic Exam   Mental status: oriented to person, place, and time  Attention: Normal. Concentration: normal.  Speech: speech is normal.  Cranial Nerves: PERRL, EOMI intact, V1-V3 Facial sensation intact. Symmetric facies. Hearing grossly intact. Palate and uvula midline, symmetric. No tongue deviation. Trapezius strength intact.     Motor exam: bulk and tone normal. Strength 5/5 grossly upper and lower extremities    Sensory exam: light touch intact    Gait exam: normal  Romberg: negative  Coordination: normal    Tremor: none  Cogwheel rigidity: none    Labs and Imaging     Labs: reviewed  No results found for this or any previous visit (from the past 24 hour(s)).    B12 assay in 2022  unremarkable  Imaging:   I have personally reviewed the images performed.   CT of the head in November of 2023 with no acute intracranial processes, does have chronic microvascular changes  Assessment and Plan     Problem List Items Addressed This Visit          Neuro    Mild cognitive impairment - Primary    Relevant Orders    Ambulatory referral/consult to Social Work       ENT    [place] (hard of hearing)     This is a patient new to me, patient is here for another opinion.  Was following with Dr. Burleson over at [facility].    This is a 89-year-old male with mild cognitive impairment.  Largely able do his own ADLs.  Discussed multifactorial nature of memory, discussed risks vascular dementia in which vascular risk problems to be followed up with primary care physician.  Discussed staying active, Lifestyle modifications including diet and exercise.  Social work was consulted for assistance given that he and his wife are both getting older, may need help around the home.  Otherwise patient is doing well.    Follow-up:  P.r.n.    Time spent on this encounter:  40 minutes. This includes face to face time and non-face to face time preparing to see the patient (eg, review of tests), obtaining and/or reviewing separately obtained history, documenting clinical information in the electronic or other health record, independently interpreting results and communicating results to the patient/family/caregiver, or care coordinator.     This note was created by combination of typed  and M-Modal dictation. Transcription and phonetic errors may be present.  If there are any questions, please contact me.

## 2024-02-16 ENCOUNTER — APPOINTMENT (OUTPATIENT)
Dept: RADIOLOGY | Facility: CLINIC | Age: 89
End: 2024-02-16
Attending: HOSPITALIST
Payer: COMMERCIAL

## 2024-02-16 DIAGNOSIS — M81.0 AGE-RELATED OSTEOPOROSIS WITHOUT CURRENT PATHOLOGICAL FRACTURE: ICD-10-CM

## 2024-02-16 PROCEDURE — 77080 DXA BONE DENSITY AXIAL: CPT | Mod: TC,PO

## 2024-02-16 PROCEDURE — 77080 DXA BONE DENSITY AXIAL: CPT | Mod: 26,,, | Performed by: INTERNAL MEDICINE

## 2024-02-23 ENCOUNTER — OFFICE VISIT (OUTPATIENT)
Dept: PODIATRY | Facility: CLINIC | Age: 89
End: 2024-02-23
Payer: COMMERCIAL

## 2024-02-23 VITALS
RESPIRATION RATE: 18 BRPM | HEIGHT: 64 IN | BODY MASS INDEX: 21.34 KG/M2 | HEART RATE: 57 BPM | SYSTOLIC BLOOD PRESSURE: 177 MMHG | WEIGHT: 125 LBS | DIASTOLIC BLOOD PRESSURE: 80 MMHG

## 2024-02-23 DIAGNOSIS — M79.675 TOE PAIN, LEFT: Primary | ICD-10-CM

## 2024-02-23 DIAGNOSIS — L97.521 SKIN ULCER OF LEFT GREAT TOE, LIMITED TO BREAKDOWN OF SKIN: ICD-10-CM

## 2024-02-23 PROCEDURE — 99214 OFFICE O/P EST MOD 30 MIN: CPT | Mod: 25,S$GLB,, | Performed by: PODIATRIST

## 2024-02-23 PROCEDURE — 1126F AMNT PAIN NOTED NONE PRSNT: CPT | Mod: CPTII,S$GLB,, | Performed by: PODIATRIST

## 2024-02-23 PROCEDURE — 1160F RVW MEDS BY RX/DR IN RCRD: CPT | Mod: CPTII,S$GLB,, | Performed by: PODIATRIST

## 2024-02-23 PROCEDURE — 1157F ADVNC CARE PLAN IN RCRD: CPT | Mod: CPTII,S$GLB,, | Performed by: PODIATRIST

## 2024-02-23 PROCEDURE — 11042 DBRDMT SUBQ TIS 1ST 20SQCM/<: CPT | Mod: S$GLB,,, | Performed by: PODIATRIST

## 2024-02-23 PROCEDURE — 1159F MED LIST DOCD IN RCRD: CPT | Mod: CPTII,S$GLB,, | Performed by: PODIATRIST

## 2024-02-23 PROCEDURE — 99999 PR PBB SHADOW E&M-EST. PATIENT-LVL IV: CPT | Mod: PBBFAC,,, | Performed by: PODIATRIST

## 2024-02-23 RX ORDER — AMLODIPINE BESYLATE 2.5 MG/1
2.5 TABLET ORAL
COMMUNITY
Start: 2024-02-08

## 2024-02-23 NOTE — PROGRESS NOTES
Subjective:      Patient ID: Brent Alatorre is a 89 y.o. male.    Chief Complaint:   Nail Problem (F/U nail fungus bilateral great toes ) and Toe Pain    Brent is a 89 y.o. male who returns to the clinic with wife c/o left toe pain x few days. Had an appt coming up but wanted to be seen sooner.   No drainage or bleeding noted.  But it has been painful in shoes  Patient is not traveling as much for work anymore playing locally     Past Medical History:   Diagnosis Date    Anemia     Bladder cancer     Cancer     bladder    Cataracts, bilateral     Colon polyp     Glaucoma suspect of both eyes     History of kidney problems     Hypertension     Primary osteoarthritis of both hands 11/29/2017     Past Surgical History:   Procedure Laterality Date    BLADDER AUGMENTATION  1995    bladder cancer removal and colon resection prostatectomy     BLADDER SURGERY      cystectomy    COLON SURGERY  1995    CYSTOSCOPY      HERNIA REPAIR      PROSTATE SURGERY  1995     Current Outpatient Medications on File Prior to Visit   Medication Sig Dispense Refill    amLODIPine (NORVASC) 2.5 MG tablet Take 2.5 mg by mouth.      acetaminophen (TYLENOL) 500 MG tablet Take 500 mg by mouth as needed.      alendronate (FOSAMAX) 70 MG tablet Take 70 mg by mouth.      chlorhexidine (PERIDEX) 0.12 % solution SMARTSIG:15 Milliliter(s) By Mouth Morning-Night      diclofenac sodium (VOLTAREN) 1 % Gel Apply 2 g topically 3 (three) times daily. 200 g 11    ergocalciferol (ERGOCALCIFEROL) 50,000 unit Cap Take 50,000 Units by mouth every 7 days.      ferrous sulfate 325 (65 FE) MG EC tablet Take by mouth.      multivitamin capsule Take 1 capsule by mouth once daily.      sodium bicarbonate 650 MG tablet TAKE 1 TABLET(650 MG) BY MOUTH TWICE DAILY 180 tablet 3    solifenacin (VESICARE) 10 MG tablet Take 1 tablet (10 mg total) by mouth once daily. 90 tablet 3    [DISCONTINUED] ciclopirox (PENLAC) 8 % Soln Apply topically nightly. Remove every 7 days with  "nail polish remover 1 Bottle 0     No current facility-administered medications on file prior to visit.     Review of patient's allergies indicates:   Allergen Reactions    Sulfa (sulfonamide antibiotics) Other (See Comments)     Sulfur makes pt sick.       Review of Systems   Constitutional: Negative for chills, decreased appetite, fever, malaise/fatigue, night sweats, weight gain and weight loss.   HENT:  Positive for hearing loss.    Cardiovascular:  Negative for chest pain, claudication, dyspnea on exertion, leg swelling, palpitations and syncope.   Respiratory:  Negative for cough and shortness of breath.    Endocrine: Negative for cold intolerance and heat intolerance.   Hematologic/Lymphatic: Negative for bleeding problem. Does not bruise/bleed easily.   Skin:  Positive for nail changes. Negative for color change, dry skin, flushing, itching, poor wound healing, rash, skin cancer, suspicious lesions and unusual hair distribution.   Musculoskeletal:  Negative for arthritis, back pain, falls, gout, joint pain, joint swelling, muscle cramps, muscle weakness, myalgias, neck pain and stiffness.        Toe pain   Gastrointestinal:  Negative for diarrhea, nausea and vomiting.   Neurological:  Negative for dizziness, focal weakness, light-headedness, numbness, paresthesias, tremors, vertigo and weakness.   Psychiatric/Behavioral:  Negative for altered mental status and depression. The patient does not have insomnia.    Allergic/Immunologic: Negative.            Objective:       Vitals:    02/23/24 1301   BP: (!) 177/80   Pulse: (!) 57   Resp: 18   Weight: 56.7 kg (125 lb)   Height: 5' 4" (1.626 m)   PainSc: 0-No pain   56.7 kg (125 lb)     Physical Exam  Vitals reviewed.   Constitutional:       General: He is not in acute distress.     Appearance: He is well-developed. He is not ill-appearing, toxic-appearing or diaphoretic.      Comments: Proper supportive shoegear   Cardiovascular:      Pulses:           Dorsalis " pedis pulses are 2+ on the right side and 2+ on the left side.        Posterior tibial pulses are 1+ on the right side and 1+ on the left side.   Musculoskeletal:      Right lower leg: No edema.      Left lower leg: No edema.      Right foot: Decreased range of motion. Deformity and bunion present. No tenderness or bony tenderness.      Left foot: Decreased range of motion. Deformity, bunion and tenderness present. No bony tenderness.      Comments:   Positive pop to nail bed left      No pain to digit with rom    Feet:      Right foot:      Protective Sensation: 10 sites tested.  10 sites sensed.      Skin integrity: No ulcer, blister, skin breakdown, erythema, warmth, callus or dry skin.      Toenail Condition: Right toenails are normal.      Left foot:      Protective Sensation: 10 sites tested.  10 sites sensed.      Skin integrity: No ulcer, blister, skin breakdown, erythema, warmth, callus or dry skin.      Toenail Condition: Fungal disease present.     Comments:     Ulcer location:  left, 1st toe nail bed  Pre debridement Measurements: 1.5cm x 2.0 x unknown cm   Post debridement Measurements : 1.5cm x 2.5 cm  x 0.1 cm   Signs of infection: No  Erythema: No  Undermining: No  Tunneling: No  Drainage: None  Periwound skin: macerated  Wound Base: granular/fibrotic       Skin:     General: Skin is warm and dry.      Capillary Refill: Capillary refill takes 2 to 3 seconds.      Coloration: Skin is pale.      Findings: No erythema or rash.      Nails: There is no clubbing.   Neurological:      Mental Status: He is alert and oriented to person, place, and time.      Sensory: No sensory deficit.      Gait: Gait abnormal.   Psychiatric:         Attention and Perception: Attention normal.         Mood and Affect: Mood normal.         Behavior: Behavior normal.         Thought Content: Thought content normal.         Cognition and Memory: Memory is impaired.         Judgment: Judgment normal.      Comments: Hearing  issues               Assessment:       Encounter Diagnoses   Name Primary?    Toe pain, left Yes    Skin ulcer of left great toe, limited to breakdown of skin          Plan:       Brent was seen today for nail problem and toe pain.    Diagnoses and all orders for this visit:    Toe pain, left    Skin ulcer of left great toe, limited to breakdown of skin      I counseled the patient on his conditions, their implications and medical management.     D/w pt seems to be ingrown/fungal caused ulcer.      - Debridement: With verbal consent, nonviable tissues on the left foot were debrided to subq utilizing a  sterile No. 15 scalpel and forceps. Minimal bleeding controlled with direct pressure  The patient tolerated this well.     Dressings: Gentian Violet  Offloading:dsd    Follow-up:Patient is to return to the clinic in 2 weeks  for follow-up but should call [facility] immediately if any signs of infection, such as fever, chills, sweats, increased redness or pain.    Short-term goals include maintaining good offloading and minimizing bioburden, promoting granulation and epithelialization to healing.  Long-term goals include keeping the wound healed by good offloading and medical management under the direction of internist.     D/w pt and wife consider nail removals     F/u 5-6 weeks sooner if needed

## 2024-02-28 DIAGNOSIS — N18.31 STAGE 3A CHRONIC KIDNEY DISEASE: Chronic | ICD-10-CM

## 2024-02-28 DIAGNOSIS — G31.84 MILD COGNITIVE IMPAIRMENT: Primary | ICD-10-CM

## 2024-02-28 DIAGNOSIS — I10 ESSENTIAL HYPERTENSION: Chronic | ICD-10-CM

## 2024-02-28 NOTE — PROGRESS NOTES
Pt's wife reports that pt has been  more forgetful lately. Reports concern that memory loss is worsening. She is interested in HH and SW

## 2024-02-29 ENCOUNTER — TELEPHONE (OUTPATIENT)
Dept: INTERNAL MEDICINE | Facility: CLINIC | Age: 89
End: 2024-02-29
Payer: MEDICARE

## 2024-03-08 PROCEDURE — G0180 MD CERTIFICATION HHA PATIENT: HCPCS | Mod: ,,, | Performed by: HOSPITALIST

## 2024-03-27 ENCOUNTER — TELEPHONE (OUTPATIENT)
Dept: INTERNAL MEDICINE | Facility: CLINIC | Age: 89
End: 2024-03-27
Payer: MEDICARE

## 2024-03-27 NOTE — TELEPHONE ENCOUNTER
Carmen with home health called. No answer, message left to return call.   Verbal order given over the phone and left on voicemail.

## 2024-03-27 NOTE — TELEPHONE ENCOUNTER
----- Message from Anup Saxena sent at 3/27/2024  2:40 PM CDT -----  Contact: [facility] [phone]  1MEDICALADVICE     Patient is calling for Medical Advice regarding: order for physical therapy    How long has patient had these symptoms:    Pharmacy name and phone#:    Would like response via [employer]:  ##    Comments:

## 2024-03-28 RX ORDER — ALENDRONATE SODIUM 70 MG/1
70 TABLET ORAL
Qty: 12 TABLET | Refills: 3 | Status: SHIPPED | OUTPATIENT
Start: 2024-03-28

## 2024-03-28 NOTE — TELEPHONE ENCOUNTER
Called and spoke to pt wife in regards of bone density. Pt wife is ok with starting prescription fosamax. Pt wife stated pt had recently had dental work done.

## 2024-03-28 NOTE — TELEPHONE ENCOUNTER
I called Carmen with Home Health again. No answer. Message left that it is okay for Physical therapy.

## 2024-03-28 NOTE — TELEPHONE ENCOUNTER
Tried calling the patient/patient's wife on both cell and home #.  No answer, message left on voicemail regarding Fosamax Rx.

## 2024-04-16 ENCOUNTER — EXTERNAL HOME HEALTH (OUTPATIENT)
Dept: HOME HEALTH SERVICES | Facility: HOSPITAL | Age: 89
End: 2024-04-16
Payer: COMMERCIAL

## 2024-04-23 ENCOUNTER — DOCUMENT SCAN (OUTPATIENT)
Dept: HOME HEALTH SERVICES | Facility: HOSPITAL | Age: 89
End: 2024-04-23
Payer: MEDICARE

## 2024-04-24 ENCOUNTER — DOCUMENT SCAN (OUTPATIENT)
Dept: HOME HEALTH SERVICES | Facility: HOSPITAL | Age: 89
End: 2024-04-24
Payer: MEDICARE

## 2024-05-03 ENCOUNTER — TELEPHONE (OUTPATIENT)
Dept: LAB | Facility: HOSPITAL | Age: 89
End: 2024-05-03
Payer: MEDICARE

## 2024-05-03 NOTE — TELEPHONE ENCOUNTER
----- Message from Anup Saxena sent at 5/3/2024  2:50 PM CDT -----  Contact: [facility] [phone]  1MEDICALADVICE     Patient is calling for Medical Advice regarding:need a order physical therapy 2 times a week     How long has patient had these symptoms:    Pharmacy name and phone#:    Would like response via [employer]:  n/a     Comments:

## 2024-05-07 ENCOUNTER — OFFICE VISIT (OUTPATIENT)
Dept: URGENT CARE | Facility: CLINIC | Age: 89
End: 2024-05-07
Payer: COMMERCIAL

## 2024-05-07 VITALS
WEIGHT: 125 LBS | SYSTOLIC BLOOD PRESSURE: 110 MMHG | TEMPERATURE: 98 F | OXYGEN SATURATION: 95 % | BODY MASS INDEX: 21.34 KG/M2 | DIASTOLIC BLOOD PRESSURE: 75 MMHG | RESPIRATION RATE: 20 BRPM | HEART RATE: 100 BPM | HEIGHT: 64 IN

## 2024-05-07 DIAGNOSIS — R05.9 COUGH, UNSPECIFIED TYPE: ICD-10-CM

## 2024-05-07 DIAGNOSIS — U07.1 COVID-19: Primary | ICD-10-CM

## 2024-05-07 LAB
CTP QC/QA: YES
SARS-COV-2 AG RESP QL IA.RAPID: POSITIVE

## 2024-05-07 PROCEDURE — 99214 OFFICE O/P EST MOD 30 MIN: CPT | Mod: S$GLB,,,

## 2024-05-07 PROCEDURE — 71046 X-RAY EXAM CHEST 2 VIEWS: CPT | Mod: FY,S$GLB,, | Performed by: RADIOLOGY

## 2024-05-07 PROCEDURE — G0179 MD RECERTIFICATION HHA PT: HCPCS | Mod: ,,, | Performed by: HOSPITALIST

## 2024-05-07 PROCEDURE — 87811 SARS-COV-2 COVID19 W/OPTIC: CPT | Mod: QW,S$GLB,,

## 2024-05-07 RX ORDER — BENZONATATE 200 MG/1
200 CAPSULE ORAL 3 TIMES DAILY PRN
Qty: 21 CAPSULE | Refills: 0 | Status: SHIPPED | OUTPATIENT
Start: 2024-05-07 | End: 2024-05-14

## 2024-05-07 RX ORDER — ALBUTEROL SULFATE 90 UG/1
2 AEROSOL, METERED RESPIRATORY (INHALATION) EVERY 6 HOURS PRN
Qty: 6.7 G | Refills: 0 | Status: SHIPPED | OUTPATIENT
Start: 2024-05-07 | End: 2024-05-12

## 2024-05-07 NOTE — PATIENT INSTRUCTIONS
Molnupiravir is to help reduce the Covid-19 infection.  Take this for 5 days as directed.    You may use the Tessalon Perles every 6-8 hours as needed if you develop cough.  If you feel as though you are wheezing or having mild chest tightness, may use the albuterol inhaler.    Your illness is likely caused by a virus and antibiotics would not be beneficial at this time.    Please drink plenty of fluids and get plenty of rest.    May gargle salt water for sore throat, a tablespoon of honey is helpful for cough, especially at night.     If not allergic, please take over the counter Tylenol (Acetaminophen) and/or Motrin (Ibuprofen) as directed for control of pain and/or fever.    Please follow up with your primary care doctor or specialist as needed.    You must understand that you have received an Urgent Care treatment only and that you may be released before all of your medical problems are known or treated.   - You, the patient, will arrange for follow up care as instructed.   - If your condition worsens or fails to improve we recommend that you receive another evaluation at the ER immediately or contact your PCP to discuss your concerns or return here.   - Follow up with your PCP or specialty clinic as directed in the next 1-2 weeks if not improved or as needed.  You can call [phone] to schedule an appointment with the appropriate provider.      If your symptoms do not improve or worsen, go to the emergency room immediately.

## 2024-05-07 NOTE — PROGRESS NOTES
"Subjective:      Patient ID: Brent Alatorre is a 89 y.o. male.    Vitals:  height is 5' 4" (1.626 m) and weight is 56.7 kg (125 lb). His oral temperature is 98.4 °F (36.9 °C). His blood pressure is 110/75 and his pulse is 100. His respiration is 20 and oxygen saturation is 95%.     Chief Complaint: Cough    Patient is a 89-year-old male with complaint of cough and possible COVID 19 infection.  Wife states his cough symptoms started 2 days ago and he has had a lot of fatigue.  She gave him a home COVID test yesterday and it was positive.  She states she had 2 doses of Paxlovid left over from the last time she had COVID, and she gave those to the patient yesterday.  Patient has been afebrile, denies any shortness a breath or chest pain, nausea, vomiting, altered mental status.  Wife states that patient did complain earlier becoming short of breath when walking across the parking lot, patient states he just feels as though he has increased fatigue.  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    Cough  This is a new problem. The current episode started in the past 7 days. The problem has been gradually worsening. The problem occurs constantly. The cough is Non-productive. Associated symptoms include nasal congestion. Pertinent negatives include no chest pain, ear pain, fever, headaches or shortness of breath. Nothing aggravates the symptoms. Treatments tried: paxlovid 2 doses. The treatment provided no relief.       Constitution: Positive for fatigue. Negative for fever and generalized weakness.   HENT:  Negative for ear pain and sinus pain.    Neck: Negative for neck pain.   Cardiovascular:  Negative for chest pain.   Respiratory:  Positive for cough. Negative for shortness of breath.    Gastrointestinal:  Negative for abdominal pain.   Neurological:  Negative for headaches.      Objective:     Physical Exam   Constitutional: He " is oriented to person, place, and time. He appears well-developed. He is cooperative.  Non-toxic appearance. He does not appear ill. No distress.      Comments:Awake, alert, sitting comfortably in exam chair   awake  HENT:   Head: Normocephalic and atraumatic.   Ears:   Right Ear: Hearing, tympanic membrane, external ear and ear canal normal.   Left Ear: Hearing, tympanic membrane, external ear and ear canal normal.   Nose: Nose normal. No mucosal edema, rhinorrhea or nasal deformity. No epistaxis. Right sinus exhibits no maxillary sinus tenderness and no frontal sinus tenderness. Left sinus exhibits no maxillary sinus tenderness and no frontal sinus tenderness.   Mouth/Throat: Uvula is midline and mucous membranes are normal. No trismus in the jaw. Normal dentition. No uvula swelling. Posterior oropharyngeal erythema (Mild) present. No oropharyngeal exudate or posterior oropharyngeal edema.   Eyes: Conjunctivae and lids are normal. No scleral icterus.   Neck: Trachea normal and phonation normal. Neck supple. No edema present. No erythema present. No neck rigidity present.   Cardiovascular: Normal rate, regular rhythm, normal heart sounds and normal pulses.   Pulmonary/Chest: Effort normal and breath sounds normal. No accessory muscle usage. No tachypnea. No respiratory distress. He has no decreased breath sounds. He has no wheezes. He has no rhonchi.   Breath sounds clear bilaterally, occasional dry cough heard during exam         Comments: Breath sounds clear bilaterally, occasional dry cough heard during exam    Abdominal: Normal appearance.   Musculoskeletal: Normal range of motion.         General: No deformity. Normal range of motion.   Lymphadenopathy:     He has no cervical adenopathy.   Neurological: He is alert and oriented to person, place, and time. He exhibits normal muscle tone. Coordination normal.   Skin: Skin is warm, dry, intact, not diaphoretic and not pale.   Psychiatric: His speech is normal and  behavior is normal. Judgment and thought content normal.   Nursing note and vitals reviewed.      Assessment:     1. COVID-19    2. Cough, unspecified type        Plan:   Patient is very well-appearing, alert and active, VSS, and appears well-hydrated.  Physical exam as documented above, no clinical evidence of respiratory failure, dehydration, or focal/systemic bacterial infection at this time.  Patient tested positive for Covid-19 in clinic today.  Covid risk score 5, discussed use of prescription anti-viral medication Molnupiravir with patient and wife, and they would like a prescription at this time.  We will not prescribe Paxlovid due to patient states 3 kidney disease.  X-ray negative for pneumonia at this time.  We will also prescribe inhaler in the event that patient has any wheezing, and Tessalon for cough.  Thoroughly reviewed anticipatory guidance of Covid infection, strict return precautions, supportive care and OTC medication, and home quarantine; patient and wife verbalizes understanding and agrees to follow-up with PCP as needed.     Results for orders placed or performed in visit on 05/07/24   SARS Coronavirus 2 Antigen, POCT Manual Read   Result Value Ref Range    SARS Coronavirus 2 Antigen Positive (A) Negative     Acceptable Yes        XR CHEST PA AND LATERAL    Result Date: 5/7/2024  EXAMINATION: XR CHEST PA AND LATERAL CLINICAL HISTORY: Cough, unspecified TECHNIQUE: PA and lateral views of the chest were performed. COMPARISON: 11/15/2023. FINDINGS: The heart is not enlarged.  Superior mediastinal structures are unremarkable.  Pulmonary vasculature is within normal limits.  The lungs are well aerated and free of focal consolidations.  There is no evidence for pneumothorax or pleural effusions.  Bony structures appear grossly intact.     No acute chest disease identified. Electronically signed by: Joesph Dallas MD Date:    05/07/2024 Time:    10:56       COVID-19    Cough,  unspecified type  -     SARS Coronavirus 2 Antigen, POCT Manual Read  -     XR CHEST PA AND LATERAL; Future; Expected date: 05/07/2024    Other orders  -     molnupiravir 200 mg capsule (EUA); Take 4 capsules (800 mg total) by mouth every 12 (twelve) hours. for 5 days  Dispense: 40 capsule; Refill: 0  -     benzonatate (TESSALON) 200 MG capsule; Take 1 capsule (200 mg total) by mouth 3 (three) times daily as needed for Cough.  Dispense: 21 capsule; Refill: 0  -     albuterol (VENTOLIN HFA) 90 mcg/actuation inhaler; Inhale 2 puffs into the lungs every 6 (six) hours as needed for Wheezing. Rescue  Dispense: 6.7 g; Refill: 0      Patient Instructions   Molnupiravir is to help reduce the Covid-19 infection.  Take this for 5 days as directed.    You may use the Tessalon Perles every 6-8 hours as needed if you develop cough.  If you feel as though you are wheezing or having mild chest tightness, may use the albuterol inhaler.    Your illness is likely caused by a virus and antibiotics would not be beneficial at this time.    Please drink plenty of fluids and get plenty of rest.    May gargle salt water for sore throat, a tablespoon of honey is helpful for cough, especially at night.     If not allergic, please take over the counter Tylenol (Acetaminophen) and/or Motrin (Ibuprofen) as directed for control of pain and/or fever.    Please follow up with your primary care doctor or specialist as needed.    You must understand that you have received an Urgent Care treatment only and that you may be released before all of your medical problems are known or treated.   - You, the patient, will arrange for follow up care as instructed.   - If your condition worsens or fails to improve we recommend that you receive another evaluation at the ER immediately or contact your PCP to discuss your concerns or return here.   - Follow up with your PCP or specialty clinic as directed in the next 1-2 weeks if not improved or as needed.  You  can call [phone] to schedule an appointment with the appropriate provider.      If your symptoms do not improve or worsen, go to the emergency room immediately.

## 2024-05-14 ENCOUNTER — DOCUMENT SCAN (OUTPATIENT)
Dept: HOME HEALTH SERVICES | Facility: HOSPITAL | Age: 89
End: 2024-05-14
Payer: MEDICARE

## 2024-05-16 ENCOUNTER — DOCUMENT SCAN (OUTPATIENT)
Dept: HOME HEALTH SERVICES | Facility: HOSPITAL | Age: 89
End: 2024-05-16
Payer: MEDICARE

## 2024-05-23 ENCOUNTER — DOCUMENT SCAN (OUTPATIENT)
Dept: HOME HEALTH SERVICES | Facility: HOSPITAL | Age: 89
End: 2024-05-23
Payer: MEDICARE

## 2024-05-24 ENCOUNTER — EXTERNAL HOME HEALTH (OUTPATIENT)
Dept: HOME HEALTH SERVICES | Facility: HOSPITAL | Age: 89
End: 2024-05-24
Payer: COMMERCIAL

## 2024-06-11 ENCOUNTER — TELEPHONE (OUTPATIENT)
Dept: INTERNAL MEDICINE | Facility: CLINIC | Age: 89
End: 2024-06-11
Payer: MEDICARE

## 2024-06-11 ENCOUNTER — OFFICE VISIT (OUTPATIENT)
Dept: PODIATRY | Facility: CLINIC | Age: 89
End: 2024-06-11
Payer: MEDICARE

## 2024-06-11 VITALS
BODY MASS INDEX: 21.07 KG/M2 | SYSTOLIC BLOOD PRESSURE: 145 MMHG | HEIGHT: 64 IN | RESPIRATION RATE: 17 BRPM | DIASTOLIC BLOOD PRESSURE: 75 MMHG | WEIGHT: 123.44 LBS | HEART RATE: 68 BPM

## 2024-06-11 DIAGNOSIS — B35.1 ONYCHOMYCOSIS DUE TO DERMATOPHYTE: ICD-10-CM

## 2024-06-11 DIAGNOSIS — L97.521 SKIN ULCER OF LEFT GREAT TOE, LIMITED TO BREAKDOWN OF SKIN: Primary | ICD-10-CM

## 2024-06-11 PROCEDURE — 1157F ADVNC CARE PLAN IN RCRD: CPT | Mod: CPTII,S$GLB,, | Performed by: PODIATRIST

## 2024-06-11 PROCEDURE — 1126F AMNT PAIN NOTED NONE PRSNT: CPT | Mod: CPTII,S$GLB,, | Performed by: PODIATRIST

## 2024-06-11 PROCEDURE — 1101F PT FALLS ASSESS-DOCD LE1/YR: CPT | Mod: CPTII,S$GLB,, | Performed by: PODIATRIST

## 2024-06-11 PROCEDURE — 99214 OFFICE O/P EST MOD 30 MIN: CPT | Mod: 25,S$GLB,, | Performed by: PODIATRIST

## 2024-06-11 PROCEDURE — 11042 DBRDMT SUBQ TIS 1ST 20SQCM/<: CPT | Mod: S$GLB,,, | Performed by: PODIATRIST

## 2024-06-11 PROCEDURE — 1159F MED LIST DOCD IN RCRD: CPT | Mod: CPTII,S$GLB,, | Performed by: PODIATRIST

## 2024-06-11 PROCEDURE — 1160F RVW MEDS BY RX/DR IN RCRD: CPT | Mod: CPTII,S$GLB,, | Performed by: PODIATRIST

## 2024-06-11 PROCEDURE — 99999 PR PBB SHADOW E&M-EST. PATIENT-LVL III: CPT | Mod: PBBFAC,,, | Performed by: PODIATRIST

## 2024-06-11 PROCEDURE — 3288F FALL RISK ASSESSMENT DOCD: CPT | Mod: CPTII,S$GLB,, | Performed by: PODIATRIST

## 2024-06-11 NOTE — PROGRESS NOTES
Subjective:      Patient ID: Brent Alatorre is a 89 y.o. male.    Chief Complaint:   Nail Problem (Bilateral great toe nails full of fungus ) and Nail Care    Brent is a 89 y.o. male who returns to the clinic with wife. Pt was hospitalized for syncope.. doing better    Left big toenail hurting occasionally in shoes        Past Medical History:   Diagnosis Date    Anemia     Bladder cancer     Cancer     bladder    Cataracts, bilateral     Colon polyp     Glaucoma suspect of both eyes     History of kidney problems     Hypertension     Primary osteoarthritis of both hands 11/29/2017     Past Surgical History:   Procedure Laterality Date    BLADDER AUGMENTATION  1995    bladder cancer removal and colon resection prostatectomy     BLADDER SURGERY      cystectomy    COLON SURGERY  1995    CYSTOSCOPY      HERNIA REPAIR      PROSTATE SURGERY  1995     Current Outpatient Medications on File Prior to Visit   Medication Sig Dispense Refill    acetaminophen (TYLENOL) 500 MG tablet Take 500 mg by mouth as needed.      alendronate (FOSAMAX) 70 MG tablet Take 1 tablet (70 mg total) by mouth every 7 days. 12 tablet 3    amLODIPine (NORVASC) 2.5 MG tablet Take 2.5 mg by mouth.      chlorhexidine (PERIDEX) 0.12 % solution SMARTSIG:15 Milliliter(s) By Mouth Morning-Night      diclofenac sodium (VOLTAREN) 1 % Gel Apply 2 g topically 3 (three) times daily. 200 g 11    ergocalciferol (ERGOCALCIFEROL) 50,000 unit Cap Take 50,000 Units by mouth every 7 days.      ferrous sulfate 325 (65 FE) MG EC tablet Take by mouth.      multivitamin capsule Take 1 capsule by mouth once daily.      sodium bicarbonate 650 MG tablet TAKE 1 TABLET(650 MG) BY MOUTH TWICE DAILY 180 tablet 3    solifenacin (VESICARE) 10 MG tablet Take 1 tablet (10 mg total) by mouth once daily. 90 tablet 3    albuterol (VENTOLIN HFA) 90 mcg/actuation inhaler Inhale 2 puffs into the lungs every 6 (six) hours as needed for Wheezing. Rescue 6.7 g 0     No current  "facility-administered medications on file prior to visit.     Review of patient's allergies indicates:   Allergen Reactions    Sulfa (sulfonamide antibiotics) Other (See Comments)     Sulfur makes pt sick.       Review of Systems   Constitutional: Negative for chills, decreased appetite, fever, malaise/fatigue, night sweats, weight gain and weight loss.   HENT:  Positive for hearing loss.    Cardiovascular:  Negative for chest pain, claudication, dyspnea on exertion, leg swelling, palpitations and syncope.   Respiratory:  Negative for cough and shortness of breath.    Endocrine: Negative for cold intolerance and heat intolerance.   Hematologic/Lymphatic: Negative for bleeding problem. Does not bruise/bleed easily.   Skin:  Positive for nail changes. Negative for color change, dry skin, flushing, itching, poor wound healing, rash, skin cancer, suspicious lesions and unusual hair distribution.   Musculoskeletal:  Negative for arthritis, back pain, falls, gout, joint pain, joint swelling, muscle cramps, muscle weakness, myalgias, neck pain and stiffness.        Toe pain   Gastrointestinal:  Negative for diarrhea, nausea and vomiting.   Neurological:  Negative for dizziness, focal weakness, light-headedness, numbness, paresthesias, tremors, vertigo and weakness.   Psychiatric/Behavioral:  Negative for altered mental status and depression. The patient does not have insomnia.    Allergic/Immunologic: Negative.            Objective:       Vitals:    06/11/24 1447   BP: (!) 145/75   Pulse: 68   Resp: 17   Weight: 56 kg (123 lb 7.3 oz)   Height: 5' 4" (1.626 m)   PainSc: 0-No pain   56 kg (123 lb 7.3 oz)     Physical Exam  Vitals reviewed.   Constitutional:       General: He is not in acute distress.     Appearance: He is well-developed. He is not ill-appearing, toxic-appearing or diaphoretic.      Comments: Proper supportive shoegear   Cardiovascular:      Pulses:           Dorsalis pedis pulses are 2+ on the right side and " 2+ on the left side.        Posterior tibial pulses are 1+ on the right side and 1+ on the left side.   Musculoskeletal:      Right lower leg: No edema.      Left lower leg: No edema.      Right foot: Decreased range of motion. Deformity and bunion present. No tenderness or bony tenderness.      Left foot: Decreased range of motion. Deformity, bunion and tenderness present. No bony tenderness.      Comments:   Positive pop to nail bed left  with debridement    No pain to digit with rom    Feet:      Right foot:      Protective Sensation: 10 sites tested.  10 sites sensed.      Skin integrity: No ulcer, blister, skin breakdown, erythema, warmth, callus or dry skin.      Toenail Condition: Right toenails are normal.      Left foot:      Protective Sensation: 10 sites tested.  10 sites sensed.      Skin integrity: No ulcer, blister, skin breakdown, erythema, warmth, callus or dry skin.      Toenail Condition: Fungal disease present.     Comments:     Ulcer location:  left, 1st toe nail bed  Pre debridement Measurements: 1.5cm x 2.0 x unknown cm   Post debridement Measurements : 1.5cm x 2.5 cm  x 0.1 cm   Signs of infection: No  Erythema: No  Undermining: No  Tunneling: No  Drainage: None  Periwound skin: macerated  Wound Base: granular/fibrotic       Skin:     General: Skin is warm and dry.      Capillary Refill: Capillary refill takes 2 to 3 seconds.      Coloration: Skin is pale.      Findings: No erythema or rash.      Nails: There is no clubbing.   Neurological:      Mental Status: He is alert and oriented to person, place, and time.      Sensory: No sensory deficit.      Gait: Gait abnormal.   Psychiatric:         Attention and Perception: Attention normal.         Mood and Affect: Mood normal.         Behavior: Behavior normal.         Thought Content: Thought content normal.         Cognition and Memory: Memory is impaired.         Judgment: Judgment normal.      Comments: Hearing issues                Assessment:       Encounter Diagnoses   Name Primary?    Skin ulcer of left great toe, limited to breakdown of skin Yes    Onychomycosis due to dermatophyte            Plan:       Brent was seen today for nail problem and nail care.    Diagnoses and all orders for this visit:    Skin ulcer of left great toe, limited to breakdown of skin    Onychomycosis due to dermatophyte        I counseled the patient on his conditions, their implications and medical management.    - Debridement: With verbal consent, nail debeided and ulcer to nail bed/nonviable tissues on the left foot were debrided to subq utilizing a  sterile No. 15 scalpel and forceps. Minimal bleeding controlled with direct pressure  The patient tolerated this well.     Dressings: Gentian Violet lidocaine gel  Offloading:Foam    Follow-up:Patient is to return to the clinic in 2 weeks  for follow-up but should call [facility] immediately if any signs of infection, such as fever, chills, sweats, increased redness or pain.    Short-term goals include maintaining good offloading and minimizing bioburden, promoting granulation and epithelialization to healing.  Long-term goals include keeping the wound healed by good offloading and medical management under the direction of internist.        F/u 2 weeks or sooner if needed

## 2024-06-11 NOTE — TELEPHONE ENCOUNTER
----- Message from Irma Fontana sent at 6/11/2024 10:41 AM CDT -----  Contact: [phone]  Patient needs a Hosp follow up appt with their PCP only.       When is the next available appointment:  07/10/2024    Symptoms:      Discharge date:06/05/2024    Needs to be seen by: Dr. Lezama     Would you prefer an answer via My Online [facility]?: Call back      Comments:

## 2024-06-17 ENCOUNTER — OFFICE VISIT (OUTPATIENT)
Dept: INTERNAL MEDICINE | Facility: CLINIC | Age: 89
End: 2024-06-17
Payer: MEDICARE

## 2024-06-17 ENCOUNTER — LAB VISIT (OUTPATIENT)
Dept: LAB | Facility: HOSPITAL | Age: 89
End: 2024-06-17
Payer: MEDICARE

## 2024-06-17 VITALS
WEIGHT: 122.38 LBS | RESPIRATION RATE: 16 BRPM | SYSTOLIC BLOOD PRESSURE: 142 MMHG | TEMPERATURE: 98 F | DIASTOLIC BLOOD PRESSURE: 74 MMHG | BODY MASS INDEX: 20.89 KG/M2 | HEIGHT: 64 IN | OXYGEN SATURATION: 99 % | HEART RATE: 61 BPM

## 2024-06-17 DIAGNOSIS — D50.9 IRON DEFICIENCY ANEMIA, UNSPECIFIED IRON DEFICIENCY ANEMIA TYPE: ICD-10-CM

## 2024-06-17 DIAGNOSIS — R55 SYNCOPE AND COLLAPSE: ICD-10-CM

## 2024-06-17 DIAGNOSIS — G31.84 MILD COGNITIVE IMPAIRMENT: ICD-10-CM

## 2024-06-17 DIAGNOSIS — Z09 HOSPITAL DISCHARGE FOLLOW-UP: Primary | ICD-10-CM

## 2024-06-17 DIAGNOSIS — N18.31 STAGE 3A CHRONIC KIDNEY DISEASE: Chronic | ICD-10-CM

## 2024-06-17 DIAGNOSIS — R91.1 PULMONARY NODULE: ICD-10-CM

## 2024-06-17 DIAGNOSIS — Z09 HOSPITAL DISCHARGE FOLLOW-UP: ICD-10-CM

## 2024-06-17 DIAGNOSIS — I10 ESSENTIAL HYPERTENSION: Chronic | ICD-10-CM

## 2024-06-17 LAB
ALBUMIN SERPL BCP-MCNC: 3.5 G/DL (ref 3.5–5.2)
ALP SERPL-CCNC: 116 U/L (ref 55–135)
ALT SERPL W/O P-5'-P-CCNC: 19 U/L (ref 10–44)
ANION GAP SERPL CALC-SCNC: 8 MMOL/L (ref 8–16)
AST SERPL-CCNC: 18 U/L (ref 10–40)
BASOPHILS # BLD AUTO: 0.02 K/UL (ref 0–0.2)
BASOPHILS NFR BLD: 0.7 % (ref 0–1.9)
BILIRUB SERPL-MCNC: 0.9 MG/DL (ref 0.1–1)
BUN SERPL-MCNC: 15 MG/DL (ref 10–30)
CALCIUM SERPL-MCNC: 9.4 MG/DL (ref 8.7–10.5)
CHLORIDE SERPL-SCNC: 112 MMOL/L (ref 95–110)
CO2 SERPL-SCNC: 19 MMOL/L (ref 23–29)
CREAT SERPL-MCNC: 1.2 MG/DL (ref 0.5–1.4)
DIFFERENTIAL METHOD BLD: ABNORMAL
EOSINOPHIL # BLD AUTO: 0.2 K/UL (ref 0–0.5)
EOSINOPHIL NFR BLD: 5.1 % (ref 0–8)
ERYTHROCYTE [DISTWIDTH] IN BLOOD BY AUTOMATED COUNT: 14.7 % (ref 11.5–14.5)
EST. GFR  (NO RACE VARIABLE): 57.1 ML/MIN/1.73 M^2
GLUCOSE SERPL-MCNC: 83 MG/DL (ref 70–110)
HCT VFR BLD AUTO: 38.6 % (ref 40–54)
HGB BLD-MCNC: 12.5 G/DL (ref 14–18)
IMM GRANULOCYTES # BLD AUTO: 0 K/UL (ref 0–0.04)
IMM GRANULOCYTES NFR BLD AUTO: 0 % (ref 0–0.5)
LYMPHOCYTES # BLD AUTO: 0.7 K/UL (ref 1–4.8)
LYMPHOCYTES NFR BLD: 24.2 % (ref 18–48)
MCH RBC QN AUTO: 30.6 PG (ref 27–31)
MCHC RBC AUTO-ENTMCNC: 32.4 G/DL (ref 32–36)
MCV RBC AUTO: 94 FL (ref 82–98)
MONOCYTES # BLD AUTO: 0.3 K/UL (ref 0.3–1)
MONOCYTES NFR BLD: 10.8 % (ref 4–15)
NEUTROPHILS # BLD AUTO: 1.8 K/UL (ref 1.8–7.7)
NEUTROPHILS NFR BLD: 59.2 % (ref 38–73)
NRBC BLD-RTO: 0 /100 WBC
PHOSPHATE SERPL-MCNC: 3.3 MG/DL (ref 2.7–4.5)
PLATELET # BLD AUTO: 256 K/UL (ref 150–450)
PMV BLD AUTO: 9.5 FL (ref 9.2–12.9)
POTASSIUM SERPL-SCNC: 4.4 MMOL/L (ref 3.5–5.1)
PROT SERPL-MCNC: 7.7 G/DL (ref 6–8.4)
RBC # BLD AUTO: 4.09 M/UL (ref 4.6–6.2)
SODIUM SERPL-SCNC: 139 MMOL/L (ref 136–145)
WBC # BLD AUTO: 2.97 K/UL (ref 3.9–12.7)

## 2024-06-17 PROCEDURE — 80053 COMPREHEN METABOLIC PANEL: CPT | Performed by: NURSE PRACTITIONER

## 2024-06-17 PROCEDURE — 85025 COMPLETE CBC W/AUTO DIFF WBC: CPT | Performed by: NURSE PRACTITIONER

## 2024-06-17 PROCEDURE — 99214 OFFICE O/P EST MOD 30 MIN: CPT | Mod: S$GLB,,, | Performed by: NURSE PRACTITIONER

## 2024-06-17 PROCEDURE — 3288F FALL RISK ASSESSMENT DOCD: CPT | Mod: CPTII,S$GLB,, | Performed by: NURSE PRACTITIONER

## 2024-06-17 PROCEDURE — 1157F ADVNC CARE PLAN IN RCRD: CPT | Mod: CPTII,S$GLB,, | Performed by: NURSE PRACTITIONER

## 2024-06-17 PROCEDURE — 84100 ASSAY OF PHOSPHORUS: CPT | Performed by: NURSE PRACTITIONER

## 2024-06-17 PROCEDURE — 36415 COLL VENOUS BLD VENIPUNCTURE: CPT | Mod: PO | Performed by: NURSE PRACTITIONER

## 2024-06-17 PROCEDURE — 1159F MED LIST DOCD IN RCRD: CPT | Mod: CPTII,S$GLB,, | Performed by: NURSE PRACTITIONER

## 2024-06-17 PROCEDURE — 1101F PT FALLS ASSESS-DOCD LE1/YR: CPT | Mod: CPTII,S$GLB,, | Performed by: NURSE PRACTITIONER

## 2024-06-17 PROCEDURE — 99999 PR PBB SHADOW E&M-EST. PATIENT-LVL IV: CPT | Mod: PBBFAC,,, | Performed by: NURSE PRACTITIONER

## 2024-06-17 PROCEDURE — 1160F RVW MEDS BY RX/DR IN RCRD: CPT | Mod: CPTII,S$GLB,, | Performed by: NURSE PRACTITIONER

## 2024-06-17 PROCEDURE — 1126F AMNT PAIN NOTED NONE PRSNT: CPT | Mod: CPTII,S$GLB,, | Performed by: NURSE PRACTITIONER

## 2024-06-17 NOTE — PROGRESS NOTES
Transitional Care Note  Subjective:       Patient ID: Brent Alatorre is a 89 y.o. male.  Chief Complaint: Hospital Follow Up (6/2)    Family and/or Caretaker present at visit?  Yes.  Diagnostic tests reviewed/disposition: I have reviewed all completed as well as pending diagnostic tests at the time of discharge.  Disease/illness education: Syncope, Pulmonary Nodule  Home health/community services discussion/referrals: Patient does not have home health established from hospital visit.  They do not need home health.  If needed, we will set up home health for the patient.   Establishment or re-establishment of referral orders for community resources: No other necessary community resources.   Discussion with other health care providers: No discussion with other health care providers necessary.   Medication Reconciliation completed post discharge: Yes    Date of Admit: 6/2/2024  Date of Discharge: 6/5/2024    Brent Alatorre is a 89 y.o. male with past medical history of hypertension, remote bladder cancer in remission who presented to [facility] on 6/[facility]/2024 for loss of consciousness. He is being admitted to [facility] for high risk syncope work-up.     Syncope; likely orthostasis   2 episodes of loss of consciousness without prodromal symptoms or post-ictal state. History of loss of consciousness associated with dehydration and patient does endorse not drinking enough water.   - VSS, orthostatic negative, initial labwork unremarkable   - CXR unremarkable, troponins negative  - EKG with dynamic changes in the inferior leads, will continue to trend EKG and troponins   - CTH with fullness that could represent a saccular aneurysm. CTA head showed no aneurysmal dilatation, dissection or flow-limiting stenosis.   - TTE with normal EF and no clear etiology   - Telemetry showing many PVCs   - Cardiology on board. Recommend coronary CTA which showed non-obstructive CAD and EP consult; will discharge with 30 day monitor.      Hypertension   Once patient was fluid resuscitated, became persistantly hypertensive.   - -158/73-78 yesterday on no antihypertensives.   - continue home 2.5 amlodipine on discharge     8mmm spiculated lung nodule   Found incidentally on coronary ct   - ct chest with contrast outpatient     SARA; resolved   BUN/Cr 19/1.31 --> 20/1.14. Likely pre-renal in the setting of dehydration.   - Improved with fluids   - Urine studies 1.6% indeterminate likely mix of pre-renal and intrinsic renal disease     Iron Deficiency Anemia   - colonoscopy past due in 2020   - iron supplement outpatient     Leukopenia   Per chart review, patient with a history of leukopenia to 2.53  - continue to monitor     Remote Hx bladder cancer  No acute issues   -- Renal ultrasound showed nonspecific mildly increased cortical echogenicity, unchanged bilateral renal cysts, postoperative changes of bladder reconstruction           Review of patient's allergies indicates:   Allergen Reactions    Sulfa (sulfonamide antibiotics) Other (See Comments)     Sulfur makes pt sick.     Prior to Admission medications    Medications Sig   acetaminophen (TYLENOL) 500 MG tablet Take 500 mg by mouth as needed.   albuterol (VENTOLIN HFA) 90 mcg/actuation inhaler Inhale 2 puffs into the lungs every 6 (six) hours as needed for Wheezing. Rescue   alendronate (FOSAMAX) 70 MG tablet Take 1 tablet (70 mg total) by mouth every 7 days.   amLODIPine (NORVASC) 2.5 MG tablet Take 2.5 mg by mouth.   chlorhexidine (PERIDEX) 0.12 % solution SMARTSIG:15 Milliliter(s) By Mouth Morning-Night   diclofenac sodium (VOLTAREN) 1 % Gel Apply 2 g topically 3 (three) times daily.   ergocalciferol (ERGOCALCIFEROL) 50,000 unit Cap Take 50,000 Units by mouth every 7 days.   ferrous sulfate 325 (65 FE) MG EC tablet Take by mouth.   multivitamin capsule Take 1 capsule by mouth once daily.   sodium bicarbonate 650 MG tablet TAKE 1 TABLET(650 MG) BY MOUTH TWICE DAILY   solifenacin  (VESICARE) 10 MG tablet Take 1 tablet (10 mg total) by mouth once daily.       Review of Systems   Neurological:  Negative for syncope.       Objective:      There were no vitals taken for this visit.    Physical Exam  Vitals reviewed.   Constitutional:       Appearance: Normal appearance.   HENT:      Head: Normocephalic and atraumatic.   Cardiovascular:      Rate and Rhythm: Normal rate and regular rhythm.      Heart sounds: Normal heart sounds. No murmur heard.  Pulmonary:      Effort: Pulmonary effort is normal.      Breath sounds: Normal breath sounds. No wheezing.   Skin:     General: Skin is warm and dry.   Neurological:      Mental Status: He is alert and oriented to person, place, and time.           I have reviewed the following:     Details / Date    [x]   Labs 06/05/2024    []   Micro     []   Pathology     [x]   Imaging 06/02/24; 06/04/2024    []   Cardiology Procedures     []   Other      Assessment:       No diagnosis found.    Plan:       1. Hospital discharge follow-up    - CBC Auto Differential; Future  - COMPREHENSIVE METABOLIC PANEL; Future    2. Syncope and collapse    Patient currently wearing 30 day monitor and has f/u with Cardiology at [facility]. Educated patient and family member on keeping diary to accompany holter results.  Denies recent syncopal episodes. Patient is attempting to hydrate but is often dismissive when advised he needs to drink. Discussed setting alarm to go off or getting portable cup with straw he can carry with him.     3. 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 Currently he also smokes marijuana.     - CT Chest With Contrast; Future    4. Iron deficiency anemia, unspecified iron deficiency anemia type    Chronic, stable, educated on iron rich foods in diet     5. Stage 3a chronic kidney disease    Chronic, labs to assess stability     - CBC Auto Differential; Future  - COMPREHENSIVE METABOLIC PANEL; Future  - PHOSPHORUS;  Future    6. Essential hypertension  7. Mild cognitive impairment    Chronic, stable

## 2024-06-21 ENCOUNTER — HOSPITAL ENCOUNTER (OUTPATIENT)
Dept: RADIOLOGY | Facility: HOSPITAL | Age: 89
Discharge: HOME | End: 2024-06-21
Attending: NURSE PRACTITIONER
Payer: MEDICARE

## 2024-06-21 DIAGNOSIS — R91.1 PULMONARY NODULE: ICD-10-CM

## 2024-06-21 PROCEDURE — 25500020 PHARM REV CODE 255: Performed by: NURSE PRACTITIONER

## 2024-06-21 PROCEDURE — 71260 CT THORAX DX C+: CPT | Mod: TC

## 2024-06-21 PROCEDURE — 71260 CT THORAX DX C+: CPT | Mod: 26,,, | Performed by: RADIOLOGY

## 2024-06-21 RX ADMIN — IOHEXOL 75 ML: 350 INJECTION, SOLUTION INTRAVENOUS at 08:06

## 2024-06-24 ENCOUNTER — OFFICE VISIT (OUTPATIENT)
Dept: PODIATRY | Facility: CLINIC | Age: 89
End: 2024-06-24
Payer: MEDICARE

## 2024-06-24 VITALS
SYSTOLIC BLOOD PRESSURE: 149 MMHG | HEIGHT: 64 IN | HEART RATE: 69 BPM | DIASTOLIC BLOOD PRESSURE: 72 MMHG | WEIGHT: 126.13 LBS | BODY MASS INDEX: 21.53 KG/M2

## 2024-06-24 DIAGNOSIS — L97.521 SKIN ULCER OF LEFT GREAT TOE, LIMITED TO BREAKDOWN OF SKIN: Primary | ICD-10-CM

## 2024-06-24 PROCEDURE — 99214 OFFICE O/P EST MOD 30 MIN: CPT | Mod: S$GLB,,, | Performed by: PODIATRIST

## 2024-06-24 PROCEDURE — 1157F ADVNC CARE PLAN IN RCRD: CPT | Mod: CPTII,S$GLB,, | Performed by: PODIATRIST

## 2024-06-24 PROCEDURE — 1160F RVW MEDS BY RX/DR IN RCRD: CPT | Mod: CPTII,S$GLB,, | Performed by: PODIATRIST

## 2024-06-24 PROCEDURE — 1126F AMNT PAIN NOTED NONE PRSNT: CPT | Mod: CPTII,S$GLB,, | Performed by: PODIATRIST

## 2024-06-24 PROCEDURE — 99999 PR PBB SHADOW E&M-EST. PATIENT-LVL III: CPT | Mod: PBBFAC,,, | Performed by: PODIATRIST

## 2024-06-24 PROCEDURE — 1159F MED LIST DOCD IN RCRD: CPT | Mod: CPTII,S$GLB,, | Performed by: PODIATRIST

## 2024-06-29 NOTE — PROGRESS NOTES
Subjective:      Patient ID: Brent Alatorre is a 89 y.o. male.    Chief Complaint:   Wound Check (Bilateral great toes/PCP- 06/17/2024/Marly Guerrero NP)    Brent is a 89 y.o. male who returns to the clinic with wife.     Patient relates his toe is doing good  All the pain has resolved  No bleeding or drainage  Past Medical History:   Diagnosis Date    Anemia     Bladder cancer     Cancer     bladder    Cataracts, bilateral     Colon polyp     Glaucoma suspect of both eyes     History of kidney problems     Hypertension     Primary osteoarthritis of both hands 11/29/2017     Past Surgical History:   Procedure Laterality Date    BLADDER AUGMENTATION  1995    bladder cancer removal and colon resection prostatectomy     BLADDER SURGERY      cystectomy    COLON SURGERY  1995    CYSTOSCOPY      HERNIA REPAIR      PROSTATE SURGERY  1995     Current Outpatient Medications on File Prior to Visit   Medication Sig Dispense Refill    alendronate (FOSAMAX) 70 MG tablet Take 1 tablet (70 mg total) by mouth every 7 days. 12 tablet 3    diclofenac sodium (VOLTAREN) 1 % Gel Apply 2 g topically 3 (three) times daily. 200 g 11    ferrous sulfate 325 (65 FE) MG EC tablet Take by mouth.      multivitamin capsule Take 1 capsule by mouth once daily.      solifenacin (VESICARE) 10 MG tablet Take 1 tablet (10 mg total) by mouth once daily. 90 tablet 3    acetaminophen (TYLENOL) 500 MG tablet Take 500 mg by mouth as needed. (Patient not taking: Reported on 6/17/2024)      albuterol (VENTOLIN HFA) 90 mcg/actuation inhaler Inhale 2 puffs into the lungs every 6 (six) hours as needed for Wheezing. Rescue 6.7 g 0    amLODIPine (NORVASC) 2.5 MG tablet Take 2.5 mg by mouth. (Patient not taking: Reported on 6/17/2024)      chlorhexidine (PERIDEX) 0.12 % solution SMARTSIG:15 Milliliter(s) By Mouth Morning-Night (Patient not taking: Reported on 6/17/2024)      ergocalciferol (ERGOCALCIFEROL) 50,000 unit Cap Take 50,000 Units by mouth every 7  "days. (Patient not taking: Reported on 6/17/2024)      sodium bicarbonate 650 MG tablet TAKE 1 TABLET(650 MG) BY MOUTH TWICE DAILY (Patient not taking: Reported on 6/17/2024) 180 tablet 3     No current facility-administered medications on file prior to visit.     Review of patient's allergies indicates:   Allergen Reactions    Sulfa (sulfonamide antibiotics) Other (See Comments)     Sulfur makes pt sick.       Review of Systems   Constitutional: Negative for chills, decreased appetite, fever, malaise/fatigue, night sweats, weight gain and weight loss.   HENT:  Positive for hearing loss.    Cardiovascular:  Negative for chest pain, claudication, dyspnea on exertion, leg swelling, palpitations and syncope.   Respiratory:  Negative for cough and shortness of breath.    Endocrine: Negative for cold intolerance and heat intolerance.   Hematologic/Lymphatic: Negative for bleeding problem. Does not bruise/bleed easily.   Skin:  Positive for nail changes. Negative for color change, dry skin, flushing, itching, poor wound healing, rash, skin cancer, suspicious lesions and unusual hair distribution.   Musculoskeletal:  Negative for arthritis, back pain, falls, gout, joint pain, joint swelling, muscle cramps, muscle weakness, myalgias, neck pain and stiffness.        Toe pain   Gastrointestinal:  Negative for diarrhea, nausea and vomiting.   Neurological:  Negative for dizziness, focal weakness, light-headedness, numbness, paresthesias, tremors, vertigo and weakness.   Psychiatric/Behavioral:  Negative for altered mental status and depression. The patient does not have insomnia.    Allergic/Immunologic: Negative.            Objective:       Vitals:    06/24/24 0901   BP: (!) 149/72   Pulse: 69   Weight: 57.2 kg (126 lb 1.7 oz)   Height: 5' 4" (1.626 m)   PainSc: 0-No pain   57.2 kg (126 lb 1.7 oz)     Physical Exam  Vitals reviewed.   Constitutional:       General: He is not in acute distress.     Appearance: He is " well-developed. He is not ill-appearing, toxic-appearing or diaphoretic.      Comments: Proper supportive shoegear   Cardiovascular:      Pulses:           Dorsalis pedis pulses are 2+ on the right side and 2+ on the left side.        Posterior tibial pulses are 1+ on the right side and 1+ on the left side.   Musculoskeletal:         General: No swelling or tenderness.      Right lower leg: No edema.      Left lower leg: No edema.      Right ankle: Normal.      Right Achilles Tendon: Normal.      Left ankle: Normal.      Left Achilles Tendon: Normal.      Right foot: Decreased range of motion. Deformity and bunion present. No tenderness or bony tenderness.      Left foot: Decreased range of motion. Deformity and bunion present. No tenderness or bony tenderness.      Comments:   No pop to nail bed  No pain to digit with rom    Feet:      Right foot:      Protective Sensation: 10 sites tested.  10 sites sensed.      Skin integrity: No ulcer, blister, skin breakdown, erythema, warmth, callus or dry skin.      Toenail Condition: Right toenails are normal.      Left foot:      Protective Sensation: 10 sites tested.  10 sites sensed.      Skin integrity: Skin breakdown present. No ulcer, blister, erythema, warmth, callus or dry skin.      Toenail Condition: Left toenails are normal.      Comments:     Ulcer location:  left, 1st toe nail bed = resolved  Mild maceration/hpk  Epitheal base         Skin:     General: Skin is warm and dry.      Capillary Refill: Capillary refill takes 2 to 3 seconds.      Coloration: Skin is pale.      Findings: No erythema or rash.      Nails: There is no clubbing.   Neurological:      Mental Status: He is alert and oriented to person, place, and time.      Sensory: No sensory deficit.      Gait: Gait abnormal.   Psychiatric:         Attention and Perception: Attention normal.         Mood and Affect: Mood normal.         Speech: Speech normal.         Behavior: Behavior normal.          Thought Content: Thought content normal.         Cognition and Memory: Cognition normal. Memory is impaired.         Judgment: Judgment normal.      Comments: Hearing issues               Assessment:       Encounter Diagnosis   Name Primary?    Skin ulcer of left great toe, limited to breakdown of skin Yes           Plan:       Brent was seen today for wound check.    Diagnoses and all orders for this visit:    Skin ulcer of left great toe, limited to breakdown of skin        I counseled the patient on his conditions, their implications and medical management.    - ulcer resolved.     - doing well    - continue to monitor    - will need to f/u few months with new nail growth

## 2024-06-30 ENCOUNTER — DOCUMENT SCAN (OUTPATIENT)
Dept: HOME HEALTH SERVICES | Facility: HOSPITAL | Age: 89
End: 2024-06-30
Payer: MEDICARE

## 2024-10-01 ENCOUNTER — PATIENT MESSAGE (OUTPATIENT)
Dept: INTERNAL MEDICINE | Facility: CLINIC | Age: 89
End: 2024-10-01
Payer: MEDICARE

## 2024-10-09 ENCOUNTER — HOSPITAL ENCOUNTER (OUTPATIENT)
Dept: RADIOLOGY | Facility: HOSPITAL | Age: 89
Discharge: HOME | End: 2024-10-09
Attending: NURSE PRACTITIONER
Payer: MEDICARE

## 2024-10-09 ENCOUNTER — OFFICE VISIT (OUTPATIENT)
Dept: INTERNAL MEDICINE | Facility: CLINIC | Age: 89
End: 2024-10-09
Payer: MEDICARE

## 2024-10-09 VITALS
BODY MASS INDEX: 21.46 KG/M2 | WEIGHT: 125.69 LBS | TEMPERATURE: 97 F | HEART RATE: 51 BPM | DIASTOLIC BLOOD PRESSURE: 82 MMHG | OXYGEN SATURATION: 99 % | HEIGHT: 64 IN | RESPIRATION RATE: 18 BRPM | SYSTOLIC BLOOD PRESSURE: 156 MMHG

## 2024-10-09 DIAGNOSIS — M25.552 LEFT HIP PAIN: Primary | ICD-10-CM

## 2024-10-09 DIAGNOSIS — M25.552 LEFT HIP PAIN: ICD-10-CM

## 2024-10-09 PROCEDURE — 72110 X-RAY EXAM L-2 SPINE 4/>VWS: CPT | Mod: TC,PO

## 2024-10-09 PROCEDURE — 73502 X-RAY EXAM HIP UNI 2-3 VIEWS: CPT | Mod: 26,LT,, | Performed by: RADIOLOGY

## 2024-10-09 PROCEDURE — 1157F ADVNC CARE PLAN IN RCRD: CPT | Mod: CPTII,S$GLB,, | Performed by: NURSE PRACTITIONER

## 2024-10-09 PROCEDURE — 72110 X-RAY EXAM L-2 SPINE 4/>VWS: CPT | Mod: 26,,, | Performed by: INTERNAL MEDICINE

## 2024-10-09 PROCEDURE — 73502 X-RAY EXAM HIP UNI 2-3 VIEWS: CPT | Mod: TC,PO,LT

## 2024-10-09 PROCEDURE — 1101F PT FALLS ASSESS-DOCD LE1/YR: CPT | Mod: CPTII,S$GLB,, | Performed by: NURSE PRACTITIONER

## 2024-10-09 PROCEDURE — 1125F AMNT PAIN NOTED PAIN PRSNT: CPT | Mod: CPTII,S$GLB,, | Performed by: NURSE PRACTITIONER

## 2024-10-09 PROCEDURE — 1160F RVW MEDS BY RX/DR IN RCRD: CPT | Mod: CPTII,S$GLB,, | Performed by: NURSE PRACTITIONER

## 2024-10-09 PROCEDURE — 1159F MED LIST DOCD IN RCRD: CPT | Mod: CPTII,S$GLB,, | Performed by: NURSE PRACTITIONER

## 2024-10-09 PROCEDURE — 99213 OFFICE O/P EST LOW 20 MIN: CPT | Mod: S$GLB,,, | Performed by: NURSE PRACTITIONER

## 2024-10-09 PROCEDURE — 99999 PR PBB SHADOW E&M-EST. PATIENT-LVL IV: CPT | Mod: PBBFAC,,, | Performed by: NURSE PRACTITIONER

## 2024-10-09 PROCEDURE — 3288F FALL RISK ASSESSMENT DOCD: CPT | Mod: CPTII,S$GLB,, | Performed by: NURSE PRACTITIONER

## 2024-10-09 NOTE — PROGRESS NOTES
Subjective:       Patient ID: Brent Alatorre is a 89 y.o. male.    Chief Complaint: Abdominal Pain (Left flank )      History of Present Illness    CHIEF COMPLAINT:  Mr. Alatorre presents today for follow up and side pain.    SIDE PAIN:  He reports experiencing left-sided pain for a few weeks, occurring almost daily. The pain is described as striking, with an intensity of 5-6 out of 10, and is alleviated by Tylenol. He denies any specific triggers for the pain onset.  He feels good overall and denies further dizziness or syncopal episodes since his last visit. His history is significant for past syncopal episodes, evaluated with a relatively normal Holter monitor. He has had a neobladder since 1995. Imaging studies have revealed kidney cysts.  He denies nausea, vomiting, or constipation, reporting regular bowel movements.  He reports improved water intake since the last visit.           Review of patient's allergies indicates:   Allergen Reactions    Sulfa (sulfonamide antibiotics) Other (See Comments)     Sulfur makes pt sick.       Medication List with Changes/Refills   Current Medications    ACETAMINOPHEN (TYLENOL) 500 MG TABLET    Take 500 mg by mouth as needed.    ALBUTEROL (VENTOLIN HFA) 90 MCG/ACTUATION INHALER    Inhale 2 puffs into the lungs every 6 (six) hours as needed for Wheezing. Rescue    ALENDRONATE (FOSAMAX) 70 MG TABLET    Take 1 tablet (70 mg total) by mouth every 7 days.    AMLODIPINE (NORVASC) 2.5 MG TABLET    Take 2.5 mg by mouth.    CHLORHEXIDINE (PERIDEX) 0.12 % SOLUTION    SMARTSIG:15 Milliliter(s) By Mouth Morning-Night    DICLOFENAC SODIUM (VOLTAREN) 1 % GEL    Apply 2 g topically 3 (three) times daily.    ERGOCALCIFEROL (ERGOCALCIFEROL) 50,000 UNIT CAP    Take 50,000 Units by mouth every 7 days.    FERROUS SULFATE 325 (65 FE) MG EC TABLET    Take by mouth.    MULTIVITAMIN CAPSULE    Take 1 capsule by mouth once daily.    SODIUM BICARBONATE 650 MG TABLET    TAKE 1 TABLET(650 MG) BY  "MOUTH TWICE DAILY    SOLIFENACIN (VESICARE) 10 MG TABLET    Take 1 tablet (10 mg total) by mouth once daily.     Review of Systems   Constitutional:  Negative for chills and fever.   Respiratory:  Negative for cough and shortness of breath.    Cardiovascular:  Negative for chest pain.   Gastrointestinal:  Negative for constipation, diarrhea, heartburn, nausea and vomiting.   Musculoskeletal:  Negative for back pain and joint pain.   Neurological:  Negative for dizziness and headaches.      Objective:   BP (!) 156/82 (BP Location: Left arm, Patient Position: Sitting)   Pulse (!) 51   Temp 97.2 °F (36.2 °C) (Temporal)   Resp 18   Ht 5' 4" (1.626 m)   Wt 57 kg (125 lb 10.6 oz)   SpO2 99%   BMI 21.57 kg/m²     Physical Exam  Vitals reviewed.   Constitutional:       Appearance: Normal appearance.   HENT:      Head: Normocephalic and atraumatic.   Cardiovascular:      Rate and Rhythm: Normal rate and regular rhythm.      Heart sounds: Normal heart sounds. No murmur heard.  Pulmonary:      Effort: Pulmonary effort is normal.      Breath sounds: Normal breath sounds. No wheezing.   Abdominal:      General: Bowel sounds are normal.      Palpations: Abdomen is soft.      Tenderness: There is no abdominal tenderness.   Musculoskeletal:      Lumbar back: No tenderness or bony tenderness. Negative right straight leg raise test and negative left straight leg raise test.      Right hip: Normal.      Left hip: Normal.   Skin:     General: Skin is warm and dry.   Neurological:      Mental Status: He is alert and oriented to person, place, and time.     I reviewed and interpreted the labs and imaging below:  11/15/2023  CT Abdomen Pelvis  Without Contrast    There is a perifissural nodule between the right middle and right lower lobe which is stable dating back to prior chest CT of 2015.  Visualized portions of the lung bases and heart show no acute abnormalities.     Liver shows no focal abnormalities on noncontrast imaging.  " Gallbladder shows no radiopaque stones or inflammatory changes.     Small hiatal hernia.  Stomach, spleen, pancreas and adrenal glands show no significant abnormalities.     Kidneys show mild cortical thinning.  Right renal hypodensities likely cysts.  Subcentimeter left renal hypodensity likely a small hyperdense cyst.  No radiopaque stones or hydronephrosis.  No stones within the expected course of the ureters.  There are postoperative changes with multiple surgical clips in the pelvis. There are postoperative changes of prostatectomy, cystectomy and bladder reconstruction     The small and large bowel show no acute inflammation or obstruction.  No free air, significant ascites or focal fluid collection to suggest abscess.     Abdominal aorta is normal in caliber with mild atherosclerosis.     No abnormal lymph node enlargement.     There is a fluid containing right inguinal hernia.     Osseous structures are demineralized.  No acute osseous abnormalities..    Assessment and Plan:     1. Left hip pain (Primary)    - X-Ray Lumbar Spine 5 View; Future  - X-Ray Hip 2 or 3 views Left with Pelvis when performed; Future    Considered potential causes for patient's intermittent side pain:  - Musculoskeletal issues  - Arthritis  - Complications related to neobladder surgery  - Adhesions from previous abdominal surgery  - Kidney cysts  Noted absence of diverticulosis on previous colonoscopy and CT  Denies constipation   Continue Tylenol PRN for pain         This note was generated with the assistance of ambient listening technology. Verbal consent was obtained by the patient and accompanying visitor(s) for the recording of patient appointment to facilitate this note. I attest to having reviewed and edited the generated note for accuracy, though some syntax or spelling errors may persist. Please contact the author of this note for any clarification.

## 2024-10-25 ENCOUNTER — TELEPHONE (OUTPATIENT)
Dept: PODIATRY | Facility: CLINIC | Age: 89
End: 2024-10-25
Payer: MEDICARE

## 2024-10-25 NOTE — TELEPHONE ENCOUNTER
Spoke with pt wife in regards to 10/28 appt. She requested to cancel because they are leaving town this weekend. Appt cancelled and pt already has follow up scheduled through portal. Pt wife verbalized understanding.

## 2024-12-20 ENCOUNTER — OFFICE VISIT (OUTPATIENT)
Dept: URGENT CARE | Facility: CLINIC | Age: 89
End: 2024-12-20
Payer: MEDICARE

## 2024-12-20 VITALS
WEIGHT: 125 LBS | BODY MASS INDEX: 21.46 KG/M2 | RESPIRATION RATE: 20 BRPM | SYSTOLIC BLOOD PRESSURE: 142 MMHG | HEART RATE: 77 BPM | TEMPERATURE: 98 F | OXYGEN SATURATION: 99 % | DIASTOLIC BLOOD PRESSURE: 77 MMHG

## 2024-12-20 DIAGNOSIS — J18.9 PNEUMONIA DUE TO INFECTIOUS ORGANISM, UNSPECIFIED LATERALITY, UNSPECIFIED PART OF LUNG: ICD-10-CM

## 2024-12-20 DIAGNOSIS — R05.9 COUGH, UNSPECIFIED TYPE: Primary | ICD-10-CM

## 2024-12-20 LAB
CTP QC/QA: YES
SARS-COV-2 AG RESP QL IA.RAPID: NEGATIVE

## 2024-12-20 RX ORDER — ALBUTEROL SULFATE 90 UG/1
2 INHALANT RESPIRATORY (INHALATION) EVERY 4 HOURS PRN
Qty: 18 G | Refills: 0 | Status: SHIPPED | OUTPATIENT
Start: 2024-12-20

## 2024-12-20 RX ORDER — BENZONATATE 200 MG/1
200 CAPSULE ORAL 3 TIMES DAILY PRN
Qty: 30 CAPSULE | Refills: 0 | Status: SHIPPED | OUTPATIENT
Start: 2024-12-20 | End: 2024-12-30

## 2024-12-20 RX ORDER — AZITHROMYCIN 500 MG/1
500 TABLET, FILM COATED ORAL DAILY
Qty: 3 TABLET | Refills: 0 | Status: SHIPPED | OUTPATIENT
Start: 2024-12-20

## 2024-12-20 NOTE — PROGRESS NOTES
Subjective:      Patient ID: Brent Alatorre is a 89 y.o. male.    Vitals:  vitals were not taken for this visit.     Chief Complaint: No chief complaint on file.    This is a 89 y.o. male who presents today with a chief complaint of non-productive cough with chest discomfort only with cough x 5 days. Pt also presents with __ ear px, __ ear congestion, nausea, vomiting, diarrhea, abd px, fever (highest temp = ), nasal congestion, sore throat and headache (area)     Pt has heart arrhythmia    Home tx: nyquil (last dose last night - half a dose), dayquil (last dose yesterday)    [facility]: pneumonia    Cough  This is a new problem. The current episode started in the past 7 days. The problem has been gradually worsening. The problem occurs constantly. The cough is Non-productive. Associated symptoms include chest pain. Pertinent negatives include no ear congestion, ear pain, fever, headaches, myalgias, nasal congestion, rhinorrhea or sore throat. His past medical history is significant for pneumonia. There is no history of asthma, bronchitis or environmental allergies.     Constitution: Negative for fever.   HENT:  Negative for ear pain and sore throat.    Cardiovascular:  Positive for chest pain.   Respiratory:  Positive for cough.    Musculoskeletal:  Negative for muscle ache.   Allergic/Immunologic: Negative for environmental allergies.   Neurological:  Negative for headaches.    Objective:     Physical Exam   Constitutional: He is oriented to person, place, and time. He appears ill. No distress. He is not intubated. normal  HENT:   Head: Normocephalic and atraumatic.   Ears:   Right Ear: Tympanic membrane, external ear and ear canal normal.   Left Ear: Tympanic membrane, external ear and ear canal normal.   Nose: No rhinorrhea or congestion.   Mouth/Throat: Mucous membranes are moist. No oropharyngeal exudate or posterior oropharyngeal erythema. Oropharynx is clear.   Eyes: Conjunctivae are normal. Pupils are equal,  round, and reactive to light. Extraocular movement intact   Neck: Neck supple.   Cardiovascular: Normal rate and normal pulses. An irregular rhythm present.   No murmur heard.  Pulmonary/Chest: Effort normal. No accessory muscle usage or stridor. No apnea, no tachypnea and no bradypnea. He is not intubated. No respiratory distress. He has decreased breath sounds in the right upper field, the right middle field, the right lower field, the left upper field, the left middle field and the left lower field. He has no wheezes. He has rhonchi in the right upper field.   Abdominal: Normal appearance and bowel sounds are normal. Soft. flat abdomen   Musculoskeletal: Normal range of motion.         General: Normal range of motion.   Neurological: no focal deficit. He is alert and oriented to person, place, and time.   Skin: Skin is warm and dry. Capillary refill takes less than 2 seconds. jaundice  Psychiatric: His behavior is normal. Mood, judgment and thought content normal.   Nursing note and vitals reviewed.    Assessment:     Plan:   1. Cough, unspecified type  - SARS Coronavirus 2 Antigen, POCT Manual Read  - XR CHEST PA AND LATERAL; Future  - benzonatate (TESSALON) 200 MG capsule; Take 1 capsule (200 mg total) by mouth 3 (three) times daily as needed.  Dispense: 30 capsule; Refill: 0    2. Pneumonia due to infectious organism, unspecified laterality, unspecified part of lung  - azithromycin (ZITHROMAX) 500 MG tablet; Take 1 tablet (500 mg total) by mouth once daily.  Dispense: 3 tablet; Refill: 0  - albuterol (VENTOLIN HFA) 90 mcg/actuation inhaler; Inhale 2 puffs into the lungs every 4 (four) hours as needed for Wheezing. Rescue  Dispense: 18 g; Refill: 0   All results discussed with pt prior to discharge from clinic

## 2024-12-26 NOTE — LETTER
December 8, 2017      Ran Escalera DO  [address] [facility] [address]           [facility]  [address] [facility] [address]  Phone: [phone]          Patient: Brent Alatorre   MR Number: [id number]   YOB: 1932   Date of Visit: 12/8/2017       Dear Dr. Ran Escalera:    Thank you for referring Brent Alatorre to me for evaluation. Attached you will find relevant portions of my assessment and plan of care.    If you have questions, please do not hesitate to call me. I look forward to following Brent Alatorre along with you.    Sincerely,    Chuck Hager, MOUNIKA    Enclosure  CC:  No Recipients    If you would like to receive this communication electronically, please contact [email or web address] or [phone] to request more information on [facility] access.    For providers and/or their staff who would like to refer a patient to [facility], please contact us through our one-stop-shop provider referral line, [facility] , at [phone].    If you feel you have received this communication in error or would no longer like to receive these types of communications, please e-mail [email or web address]          Left message reg normal lab results

## 2025-01-10 ENCOUNTER — OFFICE VISIT (OUTPATIENT)
Dept: INTERNAL MEDICINE | Facility: CLINIC | Age: OVER 89
End: 2025-01-10
Payer: MEDICARE

## 2025-01-10 VITALS
SYSTOLIC BLOOD PRESSURE: 176 MMHG | TEMPERATURE: 98 F | DIASTOLIC BLOOD PRESSURE: 80 MMHG | HEIGHT: 64 IN | OXYGEN SATURATION: 97 % | RESPIRATION RATE: 16 BRPM | HEART RATE: 64 BPM | BODY MASS INDEX: 20.67 KG/M2 | WEIGHT: 121.06 LBS

## 2025-01-10 DIAGNOSIS — Z12.5 ENCOUNTER FOR PROSTATE CANCER SCREENING: ICD-10-CM

## 2025-01-10 DIAGNOSIS — R05.2 SUBACUTE COUGH: Primary | ICD-10-CM

## 2025-01-10 DIAGNOSIS — I10 ESSENTIAL HYPERTENSION: Chronic | ICD-10-CM

## 2025-01-10 DIAGNOSIS — N18.31 STAGE 3A CHRONIC KIDNEY DISEASE: Chronic | ICD-10-CM

## 2025-01-10 PROCEDURE — 99999 PR PBB SHADOW E&M-EST. PATIENT-LVL IV: CPT | Mod: PBBFAC,,, | Performed by: HOSPITALIST

## 2025-01-10 RX ORDER — BENZONATATE 200 MG/1
200 CAPSULE ORAL
COMMUNITY
Start: 2024-05-07

## 2025-01-10 RX ORDER — PROMETHAZINE HYDROCHLORIDE AND DEXTROMETHORPHAN HYDROBROMIDE 6.25; 15 MG/5ML; MG/5ML
5 SYRUP ORAL EVERY 8 HOURS PRN
Qty: 118 ML | Refills: 0 | Status: SHIPPED | OUTPATIENT
Start: 2025-01-10 | End: 2025-01-20

## 2025-01-10 NOTE — PROGRESS NOTES
Subjective:     @Patient ID: Brent Alatorre is a 89 y.o. male.    Chief Complaint: Cough (ongoing)    HPI    89 yo M with DM2, HTN, CKD, MCI presents for f/u of cough  Seen in urgent care 3 weeks ago and dx with Pna? Though CXR was normal. Pt treated with azithromycin, tessalon perles. Never started albuterol. Pt and wife reports he is still coughing but cough is a little improved.          RESPIRATORY:  He reports a persistent dry cough and a runny nose with associated nasal soreness due to frequent blowing. He denies fever, chills, or breathing problems.    MEDICATIONS:  He completed a course of azithromycin. Tessalon pearls have not provided relief for his cough. He is not using albuterol inhaler. He has discontinued amlodipine, chlorhexidine, vitamin D, and sodium bicarbonate.      ROS:  Constitutional: -fevers, -chills  ENT: +nasal congestion  Respiratory: -difficulty breathing, +cough             Review of Systems  Past medical history, surgical history, and family medical history reviewed and updated as appropriate.    Medications and allergies reviewed.     Objective:     There were no vitals filed for this visit.  There is no height or weight on file to calculate BMI.  Physical Exam  Constitutional:       Appearance: Normal appearance.   HENT:      Head: Normocephalic and atraumatic.      Mouth/Throat:      Mouth: Mucous membranes are dry.      Pharynx: No oropharyngeal exudate.   Eyes:      General:         Right eye: No discharge.         Left eye: No discharge.      Conjunctiva/sclera: Conjunctivae normal.   Cardiovascular:      Rate and Rhythm: Normal rate and regular rhythm.      Heart sounds: No murmur heard.  Pulmonary:      Effort: Pulmonary effort is normal.      Breath sounds: Normal breath sounds.   Musculoskeletal:      Cervical back: Normal range of motion and neck supple.   Skin:     General: Skin is warm and dry.   Neurological:      Mental Status: He is alert and oriented to person,  place, and time.   Psychiatric:         Mood and Affect: Mood normal.         Behavior: Behavior normal.         Lab Results   Component Value Date    WBC 2.97 (L) 06/17/2024    HGB 12.5 (L) 06/17/2024    HCT 38.6 (L) 06/17/2024     06/17/2024    CHOL 170 06/02/2024    TRIG 58 06/02/2024    HDL 66 (H) 06/02/2024    ALT 19 06/17/2024    AST 18 06/17/2024     06/17/2024    K 4.4 06/17/2024     (H) 06/17/2024    CREATININE 1.2 06/17/2024    BUN 15 06/17/2024    CO2 19 (L) 06/17/2024    TSH 1.23 06/02/2024    PSA <0.01 12/09/2021    HGBA1C 5.6 06/19/2018       Assessment:     1. Subacute cough    2. Essential hypertension    3. Stage 3a chronic kidney disease    4. Encounter for prostate cancer screening      Plan:   Brent was seen today for cough.    Diagnoses and all orders for this visit:    Subacute cough  - new. See below  -     X-Ray Chest PA And Lateral; Future    Essential hypertension  - uncontrolled. See below  -     Comprehensive Metabolic Panel; Future  -     CBC Auto Differential; Future  -     Lipid Panel; Future  -     TSH; Future  -     Urinalysis; Future  -     PSA, Screening; Future  -     Protein/Creatinine Ratio, Urine; Future    Stage 3a chronic kidney disease  - stable. Continue to monitor   -     Comprehensive Metabolic Panel; Future  -     CBC Auto Differential; Future  -     Lipid Panel; Future  -     TSH; Future  -     Urinalysis; Future  -     PSA, Screening; Future  -     Protein/Creatinine Ratio, Urine; Future    Encounter for prostate cancer screening  -     Comprehensive Metabolic Panel; Future  -     CBC Auto Differential; Future  -     Lipid Panel; Future  -     TSH; Future  -     Urinalysis; Future  -     PSA, Screening; Future  -     Protein/Creatinine Ratio, Urine; Future    Other orders  -     promethazine-dextromethorphan (PROMETHAZINE-DM) 6.25-15 mg/5 mL Syrp; Take 5 mLs by mouth every 8 (eight) hours as needed (cough). May cause sedation       Assessment &  Plan         SUBACUTE COUGH:   Mr. Alatorre reports a persistent dry cough, though less severe than before.   Advised continuing the use of the previously prescribed albuterol inhaler.   Prescribed promethazine DM cough syrup as Tessalon pearls were ineffective.   Educated the patient on the importance of hydration for cough management and recommended increasing water intake.   Physical exam revealed clear lung sounds, with SpO2 at 97% and good breathing without dyspnea.   Ordered a repeat chest XR to rule out ongoing pneumonia.   The lingering cough may be residual from the recent infection or possibly developing into bronchitis.   Mr. Alatorre reports persistent rhinorrhea and postnasal drip.   Observed irritation under the patient's nose due to frequent blowing, which the patient has been treating with Neosporin.   Recommend OTC Flonase nasal spray for symptom relief.    Discussed the potential impact of home construction on respiratory symptoms.   Acknowledged that air particles from construction can irritate the throat and cause coughing.   Recommend relocating the bed to minimize exposure to construction-related air particles.    HYPERTENSION:   Measured blood pressure at 176/80, which is elevated.   Mr. Alatorre's wife reports good blood pressure readings at home, sometimes very low.   reports another provider discontinued amlodipine.   Considering prescribing an as-needed blood pressure medication if readings remain high.   Instructed the patient to report home blood pressure readings via portal message.   Scheduled a follow up in 1 week for the nurse to check blood pressure readings via patient portal message.          FOLLOW UP:   Follow up in 6 months for annual wellness visit.         Lupe Lezama MD  Internal Medicine    1/10/2025    This note was generated with the assistance of ambient listening technology. Verbal consent was obtained by the patient and accompanying visitor(s) for the recording of  patient appointment to facilitate this note. I attest to having reviewed and edited the generated note for accuracy, though some syntax or spelling errors may persist. Please contact the author of this note for any clarification.

## 2025-01-16 ENCOUNTER — TELEPHONE (OUTPATIENT)
Dept: INTERNAL MEDICINE | Facility: CLINIC | Age: OVER 89
End: 2025-01-16
Payer: MEDICARE

## 2025-01-16 NOTE — TELEPHONE ENCOUNTER
----- Message from Lupe Lezama MD sent at 1/10/2025  1:09 PM CST -----  Regarding: bp check  Please send a message in the portal to get pt's latest blood pressure readings. Update in vitals section and notify MD

## 2025-01-24 ENCOUNTER — PATIENT MESSAGE (OUTPATIENT)
Dept: PODIATRY | Facility: CLINIC | Age: OVER 89
End: 2025-01-24
Payer: MEDICARE

## 2025-01-27 ENCOUNTER — OFFICE VISIT (OUTPATIENT)
Dept: PODIATRY | Facility: CLINIC | Age: OVER 89
End: 2025-01-27
Payer: MEDICARE

## 2025-01-27 VITALS
HEIGHT: 64 IN | BODY MASS INDEX: 21.07 KG/M2 | WEIGHT: 123.44 LBS | HEART RATE: 55 BPM | DIASTOLIC BLOOD PRESSURE: 78 MMHG | SYSTOLIC BLOOD PRESSURE: 156 MMHG

## 2025-01-27 DIAGNOSIS — L98.491 SUPERFICIAL ULCER OF SKIN: ICD-10-CM

## 2025-01-27 DIAGNOSIS — B35.1 ONYCHOMYCOSIS DUE TO DERMATOPHYTE: Primary | ICD-10-CM

## 2025-01-27 PROCEDURE — 1160F RVW MEDS BY RX/DR IN RCRD: CPT | Mod: CPTII,S$GLB,, | Performed by: PODIATRIST

## 2025-01-27 PROCEDURE — 1159F MED LIST DOCD IN RCRD: CPT | Mod: CPTII,S$GLB,, | Performed by: PODIATRIST

## 2025-01-27 PROCEDURE — 1157F ADVNC CARE PLAN IN RCRD: CPT | Mod: CPTII,S$GLB,, | Performed by: PODIATRIST

## 2025-01-27 PROCEDURE — 1126F AMNT PAIN NOTED NONE PRSNT: CPT | Mod: CPTII,S$GLB,, | Performed by: PODIATRIST

## 2025-01-27 PROCEDURE — 99214 OFFICE O/P EST MOD 30 MIN: CPT | Mod: S$GLB,,, | Performed by: PODIATRIST

## 2025-01-27 PROCEDURE — 99999 PR PBB SHADOW E&M-EST. PATIENT-LVL III: CPT | Mod: PBBFAC,,, | Performed by: PODIATRIST

## 2025-01-27 RX ORDER — CLOTRIMAZOLE 1 %
CREAM (GRAM) TOPICAL DAILY
Qty: 60 G | Refills: 1 | Status: SHIPPED | OUTPATIENT
Start: 2025-01-27 | End: 2025-02-26

## 2025-01-27 NOTE — PROGRESS NOTES
Subjective:      Patient ID: Brent Alatorre is a 89 y.o. male.    Chief Complaint:   Nail Problem (Bilateral great toes/PCP- 01/10/2025/Lupe Lezama MD)    Brent is a 89 y.o. male who returns to the clinic with wife.   Nails came back about the same. Not currently applying anything.    Pain only reported occasionally      Past Medical History:   Diagnosis Date    Anemia     Bladder cancer     Cancer     bladder    Cataracts, bilateral     Colon polyp     Glaucoma suspect of both eyes     History of kidney problems     Hypertension     Primary osteoarthritis of both hands 11/29/2017     Past Surgical History:   Procedure Laterality Date    BLADDER AUGMENTATION  1995    bladder cancer removal and colon resection prostatectomy     BLADDER SURGERY      cystectomy    COLON SURGERY  1995    CYSTOSCOPY      HERNIA REPAIR      PROSTATE SURGERY  1995     Current Outpatient Medications on File Prior to Visit   Medication Sig Dispense Refill    acetaminophen (TYLENOL) 500 MG tablet Take 500 mg by mouth as needed.      alendronate (FOSAMAX) 70 MG tablet Take 1 tablet (70 mg total) by mouth every 7 days. 12 tablet 3    benzonatate (TESSALON) 200 MG capsule Take 200 mg by mouth.      ferrous sulfate 325 (65 FE) MG EC tablet Take by mouth.      multivitamin capsule Take 1 capsule by mouth once daily.      solifenacin (VESICARE) 10 MG tablet Take 1 tablet (10 mg total) by mouth once daily. 90 tablet 3    albuterol (VENTOLIN HFA) 90 mcg/actuation inhaler Inhale 2 puffs into the lungs every 4 (four) hours as needed for Wheezing. Rescue (Patient not taking: Reported on 1/27/2025) 18 g 0     No current facility-administered medications on file prior to visit.     Review of patient's allergies indicates:   Allergen Reactions    Sulfa (sulfonamide antibiotics) Other (See Comments)     Sulfur makes pt sick.       Review of Systems   Constitutional: Negative for chills, decreased appetite, fever, malaise/fatigue, night sweats,  "weight gain and weight loss.   HENT:  Positive for hearing loss.    Cardiovascular:  Negative for chest pain, claudication, dyspnea on exertion, leg swelling, palpitations and syncope.   Respiratory:  Negative for cough and shortness of breath.    Endocrine: Negative for cold intolerance and heat intolerance.   Hematologic/Lymphatic: Negative for bleeding problem. Does not bruise/bleed easily.   Skin:  Positive for nail changes. Negative for color change, dry skin, flushing, itching, poor wound healing, rash, skin cancer, suspicious lesions and unusual hair distribution.   Musculoskeletal:  Negative for arthritis, back pain, falls, gout, joint pain, joint swelling, muscle cramps, muscle weakness, myalgias, neck pain and stiffness.        Toe pain   Gastrointestinal:  Negative for diarrhea, nausea and vomiting.   Neurological:  Negative for dizziness, focal weakness, light-headedness, numbness, paresthesias, tremors, vertigo and weakness.   Psychiatric/Behavioral:  Negative for altered mental status and depression. The patient does not have insomnia.    Allergic/Immunologic: Negative.            Objective:       Vitals:    01/27/25 0734   BP: (!) 156/78   Pulse: (!) 55   Weight: 56 kg (123 lb 7.3 oz)   Height: 5' 4" (1.626 m)   PainSc: 0-No pain   56 kg (123 lb 7.3 oz)     Physical Exam  Vitals reviewed.   Constitutional:       General: He is not in acute distress.     Appearance: He is well-developed. He is not ill-appearing, toxic-appearing or diaphoretic.      Comments: Proper supportive shoegear   Cardiovascular:      Pulses:           Dorsalis pedis pulses are 2+ on the right side and 2+ on the left side.        Posterior tibial pulses are 1+ on the right side and 1+ on the left side.   Musculoskeletal:         General: No swelling or tenderness.      Right lower leg: No edema.      Left lower leg: No edema.      Right ankle: Normal.      Right Achilles Tendon: Normal.      Left ankle: Normal.      Left Achilles " Tendon: Normal.      Right foot: Decreased range of motion. Deformity and bunion present. No tenderness or bony tenderness.      Left foot: Decreased range of motion. Deformity and bunion present. No tenderness or bony tenderness.      Comments:   No pain with debridement or on palpation    Feet:      Right foot:      Protective Sensation: 10 sites tested.  10 sites sensed.      Skin integrity: No ulcer, blister, skin breakdown, erythema, warmth, callus or dry skin.      Toenail Condition: Right toenails are abnormally thick. Fungal disease present.     Left foot:      Protective Sensation: 10 sites tested.  10 sites sensed.      Skin integrity: Skin breakdown present. No ulcer, blister, erythema, warmth, callus or dry skin.      Toenail Condition: Left toenails are abnormally thick. Fungal disease present.     Comments:     Ulcer location:  left, 1st toe nail bed = mild maceration/superficial breakdown      B/l hallux nails thickened/discolored/mycotic. No soi       Skin:     General: Skin is dry.      Capillary Refill: Capillary refill takes 2 to 3 seconds.      Findings: No erythema or rash.      Nails: There is no clubbing.   Neurological:      Mental Status: He is alert and oriented to person, place, and time.      Gait: Gait abnormal.   Psychiatric:         Attention and Perception: Attention normal.         Mood and Affect: Mood normal.         Speech: Speech normal.         Behavior: Behavior normal.         Thought Content: Thought content normal.         Cognition and Memory: Cognition normal. Memory is impaired.         Judgment: Judgment normal.      Comments: Hearing issues               Assessment:       Encounter Diagnoses   Name Primary?    Onychomycosis due to dermatophyte Yes    Superficial ulcer of skin              Plan:       Brent was seen today for nail problem.    Diagnoses and all orders for this visit:    Onychomycosis due to dermatophyte    Superficial ulcer of skin    Other orders  -      clotrimazole (LOTRIMIN) 1 % cream; Apply topically once daily.          I counseled the patient on his conditions, their implications and medical management.       With patient's permission, the toenails mentioned above were aggressively reduced and debrided using a nail nipper, removing all offending nail and debris x 2    Applied g.violet to left nail bed    Rx antifungal cream. Consider penlac but for ease of application start with cream.   Consider removal again.  Consider oral lamisil but would need pcp on board      F/u 3 months

## 2025-06-19 ENCOUNTER — OFFICE VISIT (OUTPATIENT)
Dept: URGENT CARE | Facility: CLINIC | Age: OVER 89
End: 2025-06-19
Payer: MEDICARE

## 2025-06-19 VITALS
WEIGHT: 123 LBS | HEART RATE: 58 BPM | TEMPERATURE: 98 F | OXYGEN SATURATION: 95 % | DIASTOLIC BLOOD PRESSURE: 79 MMHG | SYSTOLIC BLOOD PRESSURE: 151 MMHG | BODY MASS INDEX: 21.11 KG/M2

## 2025-06-19 DIAGNOSIS — J06.9 VIRAL UPPER RESPIRATORY TRACT INFECTION WITH COUGH: Primary | ICD-10-CM

## 2025-06-19 DIAGNOSIS — R05.9 COUGH, UNSPECIFIED TYPE: ICD-10-CM

## 2025-06-19 LAB
CTP QC/QA: YES
SARS-COV+SARS-COV-2 AG RESP QL IA.RAPID: NEGATIVE

## 2025-06-19 PROCEDURE — 71046 X-RAY EXAM CHEST 2 VIEWS: CPT | Mod: FY,S$GLB,, | Performed by: RADIOLOGY

## 2025-06-19 RX ORDER — IPRATROPIUM BROMIDE 21 UG/1
2 SPRAY, METERED NASAL 2 TIMES DAILY
Qty: 30 ML | Refills: 0 | Status: SHIPPED | OUTPATIENT
Start: 2025-06-19

## 2025-06-19 RX ORDER — BENZONATATE 200 MG/1
200 CAPSULE ORAL 3 TIMES DAILY PRN
Qty: 30 CAPSULE | Refills: 0 | Status: SHIPPED | OUTPATIENT
Start: 2025-06-19

## 2025-06-19 NOTE — PROGRESS NOTES
Subjective:      Patient ID: Brent Alatorre is a 89 y.o. male.    Vitals:  weight is 55.8 kg (123 lb). His oral temperature is 98.1 °F (36.7 °C). His blood pressure is 151/79 (abnormal) and his pulse is 58 (abnormal). His oxygen saturation is 95%.     Chief Complaint: Cough    This is a 89 y.o. male who presents today with a chief complaint of cough, back pain, nasal congestion x 4 days  Home Tx: Tylenol, Promethazine     Provider note starts below:  Patient presents to clinic for evaluation. Patient has had cough for the past 4 days. Having difficulty sleeping at night due to cough. He has tried promethazine DM at home with minimal improvement. Associated symptoms include sore throat, postnasal drip, and rhinorrhea. Unsure of any known sick contacts. Denies fever, chills, body aches, headaches, n/v/d, palpitations, CP, SOB, wheezing.        Cough  The current episode started in the past 7 days. The problem has been unchanged. The cough is Non-productive. Associated symptoms include nasal congestion, postnasal drip and a sore throat. Pertinent negatives include no chest pain, chills, ear pain, fever, headaches, hemoptysis, myalgias, rash, shortness of breath, sweats or wheezing. There is no history of asthma, bronchitis or COPD.       Constitution: Negative for chills and fever.   HENT:  Positive for postnasal drip and sore throat. Negative for ear pain, congestion, sinus pain and sinus pressure.         +rhinorrhea   Neck: Negative for neck pain and neck stiffness.   Cardiovascular:  Negative for chest pain and palpitations.   Eyes:  Negative for double vision and blurred vision.   Respiratory:  Positive for cough. Negative for chest tightness, sputum production, bloody sputum, shortness of breath and wheezing.    Gastrointestinal:  Negative for nausea and vomiting.   Genitourinary:  Negative for bladder incontinence.   Musculoskeletal:  Negative for muscle cramps and muscle ache.   Skin:  Negative for pale and  rash.   Neurological:  Negative for dizziness, light-headedness, headaches, disorientation and altered mental status.   Psychiatric/Behavioral:  Negative for altered mental status, disorientation and confusion.       Objective:     Physical Exam   Constitutional: He is oriented to person, place, and time.  Non-toxic appearance. He does not appear ill. No distress.   HENT:   Head: Normocephalic and atraumatic.   Ears:   Right Ear: Tympanic membrane, external ear and ear canal normal.   Left Ear: Tympanic membrane, external ear and ear canal normal.   Nose: Rhinorrhea present.   Mouth/Throat: Mucous membranes are moist. Oropharynx is clear.   Eyes: Conjunctivae are normal. Extraocular movement intact   Neck: Neck supple.   Cardiovascular: Normal rate, regular rhythm, normal heart sounds and normal pulses.   Pulmonary/Chest: Effort normal and breath sounds normal. No stridor. No respiratory distress. He has no wheezes. He has no rhonchi. He has no rales.         Comments: Persistent cough witnessed during exam. No acute respiratory distress. Able to speak in full sentences. No tripoding. No nasal flaring. No cyanosis. No accessory muscle use.    Abdominal: Normal appearance.   Musculoskeletal: Normal range of motion.         General: Normal range of motion.   Neurological: He is alert, oriented to person, place, and time and at baseline.   Skin: Skin is warm and dry.   Psychiatric: His behavior is normal. Mood normal.   Nursing note and vitals reviewed.    Assessment:     Results for orders placed or performed in visit on 06/19/25   SARS Coronavirus 2 Antigen, POCT Manual Read    Collection Time: 06/19/25  9:12 AM   Result Value Ref Range    SARS Coronavirus 2 Antigen Negative Negative, Presumptive Negative     Acceptable Yes      XR CHEST PA AND LATERAL  Result Date: 6/19/2025  EXAMINATION: XR CHEST PA AND LATERAL CLINICAL HISTORY: Cough, unspecified TECHNIQUE: PA and lateral views of the chest were  performed. COMPARISON: 12/20/2024 FINDINGS: The lungs are clear, with normal appearance of pulmonary vasculature.  No pleural effusion or pneumothorax. The cardiac silhouette is normal in size. The hilar and mediastinal contours are unremarkable. Bones are intact..     No acute abnormality. Electronically signed by: Tatyana Saunders MD Date:    06/19/2025 Time:    09:31        1. Viral upper respiratory tract infection with cough    2. Cough, unspecified type        Plan:     Viral upper respiratory tract infection with cough  -     ipratropium (ATROVENT) 21 mcg (0.03 %) nasal spray; 2 sprays by Each Nostril route 2 (two) times daily.  Dispense: 30 mL; Refill: 0  -     benzonatate (TESSALON) 200 MG capsule; Take 1 capsule (200 mg total) by mouth 3 (three) times daily as needed for Cough.  Dispense: 30 capsule; Refill: 0    Cough, unspecified type  -     SARS Coronavirus 2 Antigen, POCT Manual Read  -     XR CHEST PA AND LATERAL; Future; Expected date: 06/19/2025      Medical Decision Making:   Urgent Care Management:  SpO2 93% on arrival.  Rechecked after 30 minutes, SpO2 95%.   No acute respiratory distress.        Patient Instructions   Plan of Care  Ipratropium bromide nasal spray, 2 sprays in each nostril twice daily for rhinorrhea and postnasal drip.  Continue Promethazine DM 5 mLs nightly as needed for cough.  Benzonatate up to 3 times daily as needed for cough.  Recommend over the counter oral antihistamine (zyrtec, allegra, claritin) to help with congestion and sinus pressure.  If not allergic, take Tylenol (Acetaminophen) 650 mg to  1 g every 6 hours as needed for fever or pain.  Please drink plenty of fluids.  Please get plenty of rest.  Nasal irrigation with a saline spray or Netti Pot like device per their directions is also recommended.  If you smoke, please stop smoking.    To help ease a sore throat, you can:  Use a sore throat spray.  Suck on hard candy or throat lozenges.  Gargle with warm  saltwater a few times each day. Mix of 1/4 teaspoon (1.25 grams) salt in 8 ounces (240 mL) of warm water.  Use a cool mist humidifier to help you breathe easier.    Discussed prescriptions and over-the-counter medicines to help with patient's symptoms:  An antihistamine (loratadine,zyrtec,allegra, xyzal) can help with itching, sneezing, or runny nose.  An antihistamine eye drop can help with itchy eyes.  Medications that control cough are suppressants and expectorants. Suppressants are tessalon pearls and dextromethorphan. If you have a productive cough with sputum, you need an expectorant called guaifenesin. Dextromethorphan and Guaifenesin are active ingredients in many OTC cough/cold medications such as Dayquil/Nyquil, Mucinex, and Robitussin Mucus+Chest Congestion.            Common Cold Medicine Ingredients Cheat sheet  Acetaminophen (APAP) -pain reliever/fever reducer  Dextromethorphan - cough suppressant  Guaifenesin - expectorant/thins and loosens mucus  Phenylephrine - nasal decongestant  Diphenhydramine or Doxylamine succinate - antihistamine, helps you fall asleep  Promethazine or Brompheniramine - Prescription strength antihistamines    Please remember that you have received care at an urgent care today. Urgent cares are not emergency rooms and are not equipped to handle life threatening emergencies and cannot rule in or out certain medical conditions and you may be released before all of your medical problems are known or treated.     Please arrange follow up with your primary care physician or speciality clinic within 2-5 days if your signs and symptoms have not resolved or worsen.     Patient can call our Referral Hotline at [phone] to make an appointment.    Please return here or go to the Emergency Department for any concerns or worsening of condition.  Signs of infection. These include a fever of 100.4°F (38°C) or higher, chills, cough, more sputum or change in color of sputum.  You are  having so much trouble breathing that you can only say one or two words at a time.  You need to sit upright at all times to be able to breathe and or cannot lie down.  You have trouble breathing when talking or sitting still.  You have a fever of 100.4°F (38°C) or higher or chills.  You have chest pain when you cough, have trouble breathing but can still talk in full sentences, or cough up blood.

## 2025-06-19 NOTE — PATIENT INSTRUCTIONS
Plan of Care  Ipratropium bromide nasal spray, 2 sprays in each nostril twice daily for rhinorrhea and postnasal drip.  Continue Promethazine DM 5 mLs nightly as needed for cough.  Benzonatate up to 3 times daily as needed for cough.  Recommend over the counter oral antihistamine (zyrtec, allegra, claritin) to help with congestion and sinus pressure.  If not allergic, take Tylenol (Acetaminophen) 650 mg to  1 g every 6 hours as needed for fever or pain.  Please drink plenty of fluids.  Please get plenty of rest.  Nasal irrigation with a saline spray or Netti Pot like device per their directions is also recommended.  If you smoke, please stop smoking.    To help ease a sore throat, you can:  Use a sore throat spray.  Suck on hard candy or throat lozenges.  Gargle with warm saltwater a few times each day. Mix of 1/4 teaspoon (1.25 grams) salt in 8 ounces (240 mL) of warm water.  Use a cool mist humidifier to help you breathe easier.    Discussed prescriptions and over-the-counter medicines to help with patient's symptoms:  An antihistamine (loratadine,zyrtec,allegra, xyzal) can help with itching, sneezing, or runny nose.  An antihistamine eye drop can help with itchy eyes.  Medications that control cough are suppressants and expectorants. Suppressants are tessalon pearls and dextromethorphan. If you have a productive cough with sputum, you need an expectorant called guaifenesin. Dextromethorphan and Guaifenesin are active ingredients in many OTC cough/cold medications such as Dayquil/Nyquil, Mucinex, and Robitussin Mucus+Chest Congestion.            Common Cold Medicine Ingredients Cheat sheet  Acetaminophen (APAP) -pain reliever/fever reducer  Dextromethorphan - cough suppressant  Guaifenesin - expectorant/thins and loosens mucus  Phenylephrine - nasal decongestant  Diphenhydramine or Doxylamine succinate - antihistamine, helps you fall asleep  Promethazine or Brompheniramine - Prescription strength  antihistamines    Please remember that you have received care at an urgent care today. Urgent cares are not emergency rooms and are not equipped to handle life threatening emergencies and cannot rule in or out certain medical conditions and you may be released before all of your medical problems are known or treated.     Please arrange follow up with your primary care physician or speciality clinic within 2-5 days if your signs and symptoms have not resolved or worsen.     Patient can call our Referral Hotline at [phone] to make an appointment.    Please return here or go to the Emergency Department for any concerns or worsening of condition.  Signs of infection. These include a fever of 100.4°F (38°C) or higher, chills, cough, more sputum or change in color of sputum.  You are having so much trouble breathing that you can only say one or two words at a time.  You need to sit upright at all times to be able to breathe and or cannot lie down.  You have trouble breathing when talking or sitting still.  You have a fever of 100.4°F (38°C) or higher or chills.  You have chest pain when you cough, have trouble breathing but can still talk in full sentences, or cough up blood.

## 2025-07-03 ENCOUNTER — LAB VISIT (OUTPATIENT)
Dept: LAB | Facility: HOSPITAL | Age: OVER 89
End: 2025-07-03
Attending: HOSPITALIST
Payer: MEDICARE

## 2025-07-03 DIAGNOSIS — I10 ESSENTIAL HYPERTENSION: ICD-10-CM

## 2025-07-03 DIAGNOSIS — Z12.5 ENCOUNTER FOR PROSTATE CANCER SCREENING: ICD-10-CM

## 2025-07-03 DIAGNOSIS — N18.31 STAGE 3A CHRONIC KIDNEY DISEASE: ICD-10-CM

## 2025-07-03 LAB
ABSOLUTE EOSINOPHIL (OHS): 0.16 K/UL
ABSOLUTE MONOCYTE (OHS): 0.3 K/UL (ref 0.3–1)
ABSOLUTE NEUTROPHIL COUNT (OHS): 1.52 K/UL (ref 1.8–7.7)
ALBUMIN SERPL BCP-MCNC: 3.3 G/DL (ref 3.5–5.2)
ALP SERPL-CCNC: 99 UNIT/L (ref 40–150)
ALT SERPL W/O P-5'-P-CCNC: 13 UNIT/L (ref 10–44)
ANION GAP (OHS): 6 MMOL/L (ref 8–16)
AST SERPL-CCNC: 16 UNIT/L (ref 11–45)
BASOPHILS # BLD AUTO: 0.02 K/UL
BASOPHILS NFR BLD AUTO: 0.7 %
BILIRUB SERPL-MCNC: 0.6 MG/DL (ref 0.1–1)
BUN SERPL-MCNC: 14 MG/DL (ref 10–30)
CALCIUM SERPL-MCNC: 9 MG/DL (ref 8.7–10.5)
CHLORIDE SERPL-SCNC: 116 MMOL/L (ref 95–110)
CHOLEST SERPL-MCNC: 160 MG/DL (ref 120–199)
CHOLEST/HDLC SERPL: 2.6 {RATIO} (ref 2–5)
CO2 SERPL-SCNC: 19 MMOL/L (ref 23–29)
CREAT SERPL-MCNC: 1.3 MG/DL (ref 0.5–1.4)
ERYTHROCYTE [DISTWIDTH] IN BLOOD BY AUTOMATED COUNT: 14.5 % (ref 11.5–14.5)
GFR SERPLBLD CREATININE-BSD FMLA CKD-EPI: 52 ML/MIN/1.73/M2
GLUCOSE SERPL-MCNC: 83 MG/DL (ref 70–110)
HCT VFR BLD AUTO: 36 % (ref 40–54)
HDLC SERPL-MCNC: 62 MG/DL (ref 40–75)
HDLC SERPL: 38.8 % (ref 20–50)
HGB BLD-MCNC: 11 GM/DL (ref 14–18)
IMM GRANULOCYTES # BLD AUTO: 0.01 K/UL (ref 0–0.04)
IMM GRANULOCYTES NFR BLD AUTO: 0.4 % (ref 0–0.5)
LDLC SERPL CALC-MCNC: 87.6 MG/DL (ref 63–159)
LYMPHOCYTES # BLD AUTO: 0.79 K/UL (ref 1–4.8)
MCH RBC QN AUTO: 28.6 PG (ref 27–31)
MCHC RBC AUTO-ENTMCNC: 30.6 G/DL (ref 32–36)
MCV RBC AUTO: 94 FL (ref 82–98)
NONHDLC SERPL-MCNC: 98 MG/DL
NUCLEATED RBC (/100WBC) (OHS): 0 /100 WBC
PLATELET # BLD AUTO: 257 K/UL (ref 150–450)
PMV BLD AUTO: 9.4 FL (ref 9.2–12.9)
POTASSIUM SERPL-SCNC: 4.7 MMOL/L (ref 3.5–5.1)
PROT SERPL-MCNC: 7 GM/DL (ref 6–8.4)
PSA SERPL-MCNC: <0.01 NG/ML
RBC # BLD AUTO: 3.84 M/UL (ref 4.6–6.2)
RELATIVE EOSINOPHIL (OHS): 5.7 %
RELATIVE LYMPHOCYTE (OHS): 28.2 % (ref 18–48)
RELATIVE MONOCYTE (OHS): 10.7 % (ref 4–15)
RELATIVE NEUTROPHIL (OHS): 54.3 % (ref 38–73)
SODIUM SERPL-SCNC: 141 MMOL/L (ref 136–145)
TRIGL SERPL-MCNC: 52 MG/DL (ref 30–150)
TSH SERPL-ACNC: 1.12 UIU/ML (ref 0.4–4)
WBC # BLD AUTO: 2.8 K/UL (ref 3.9–12.7)

## 2025-07-03 PROCEDURE — 84443 ASSAY THYROID STIM HORMONE: CPT

## 2025-07-03 PROCEDURE — 80061 LIPID PANEL: CPT

## 2025-07-03 PROCEDURE — 85025 COMPLETE CBC W/AUTO DIFF WBC: CPT

## 2025-07-03 PROCEDURE — 36415 COLL VENOUS BLD VENIPUNCTURE: CPT | Mod: PO

## 2025-07-03 PROCEDURE — 80053 COMPREHEN METABOLIC PANEL: CPT

## 2025-07-03 PROCEDURE — 84153 ASSAY OF PSA TOTAL: CPT

## 2025-07-17 ENCOUNTER — OFFICE VISIT (OUTPATIENT)
Facility: CLINIC | Age: OVER 89
End: 2025-07-17
Payer: MEDICARE

## 2025-07-17 VITALS
HEIGHT: 64 IN | HEART RATE: 93 BPM | BODY MASS INDEX: 20.25 KG/M2 | SYSTOLIC BLOOD PRESSURE: 129 MMHG | WEIGHT: 118.63 LBS | DIASTOLIC BLOOD PRESSURE: 89 MMHG

## 2025-07-17 DIAGNOSIS — F02.80 MAJOR NEUROCOGNITIVE DISORDER DUE TO MULTIPLE ETIOLOGIES: ICD-10-CM

## 2025-07-17 PROCEDURE — 99215 OFFICE O/P EST HI 40 MIN: CPT | Mod: S$GLB,,, | Performed by: NURSE PRACTITIONER

## 2025-07-17 PROCEDURE — 96116 NUBHVL XM PHYS/QHP 1ST HR: CPT | Mod: S$GLB,,, | Performed by: PSYCHIATRY & NEUROLOGY

## 2025-07-17 PROCEDURE — 96132 NRPSYC TST EVAL PHYS/QHP 1ST: CPT | Mod: S$GLB,,, | Performed by: PSYCHIATRY & NEUROLOGY

## 2025-07-17 PROCEDURE — G2211 COMPLEX E/M VISIT ADD ON: HCPCS | Mod: S$GLB,,, | Performed by: NURSE PRACTITIONER

## 2025-07-17 PROCEDURE — 96139 PSYCL/NRPSYC TST TECH EA: CPT | Mod: S$GLB,,, | Performed by: PSYCHIATRY & NEUROLOGY

## 2025-07-17 PROCEDURE — 96138 PSYCL/NRPSYC TECH 1ST: CPT | Mod: S$GLB,,, | Performed by: PSYCHIATRY & NEUROLOGY

## 2025-07-17 PROCEDURE — 96133 NRPSYC TST EVAL PHYS/QHP EA: CPT | Mod: S$GLB,,, | Performed by: PSYCHIATRY & NEUROLOGY

## 2025-07-17 PROCEDURE — 99999 PR PBB SHADOW E&M-EST. PATIENT-LVL III: CPT | Mod: PBBFAC,,,

## 2025-07-17 NOTE — PROGRESS NOTES
"NEUROPSYCHOLOGY   [facility]  Intake    Referring Provider: No ref. provider found   Medical Necessity: Mr. Brent Alatorre is an 89 y.o. male followed in [facility] for cognitive evaluation, supportive therapy, treatment planning, and treatment management in the setting of cognitive changes.  Billing: See billing table at the end of this note  Consent: The patient expressed an understanding of the purpose of the evaluation and consented to all procedures.  Providers: Carie Gonzalez PsyD (Neuropsychology); Cecille Oden DNP (Neurology); Romeo Early, PhD (Neuropsychology Resident)      ASSESSMENT:   Mr. Brent Alatorre is a right-handed 89 y.o. male with a history of anemia, bladder cancer, cataracts, glaucoma, CKD3, HTN, MCI, and primary osteoarthritis here for cognitive evaluation. Pt is presenting for evaluation for worsening cognitive symptoms since 2019. He is a  and continues playing and singing, though his band mates report he's forgotten lyrics. Wife reports pt has confusion when he first wakes in the morning. 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 Recent reversible labs unremarkable. Dr. Oden's neurological exam documented reduced R arm swing and subtle RH bradykinesia but otherwise unremarkable. Pt is hard of hearing and inconsistently wears hearing aids. Also reports anosmia. Anticholinergic burden is elevated (ACB = 3; Vesicare). Pt denied anxiety and depression symptoms. Reports smoking marijuana once per week, previously a daily smoker. Sleeps about 12 hrs per night, with occasional sleep talking in the last year. Appetite is decreased, with weight loss. He was disoriented to date today. Pt is dependent " for IADLs, requires some prompting and oversight for ADLs. Currently resides with his wife. Someone is always present with the pt as he has wandered from the house once, about 1.5 years ago.     Cognitive Testing:   - Impairments in memory consolidation, processing speed, verbal fluency, and aspects of executive functioning.  - Confrontation naming below expectation and notable for paraphasic errors and circumlocution.  - Visuospatial skills were generally intact for simpler geometric figures and a clock.   - Preserved basic attention  - He denied depression symptoms on a self-report screener.   - Pt's wife endorsed symptoms of appetite/eating disturbance on a neuropsychiatric inventory.    Taken together, testing is most suggestive of moderate dementia  with both cortical and subcortical deficits and fronto-temporal systems dysfunction. 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 No parkinsonism documented on neurological exam but given reported loss of smell and new onset sleep talking, can continue to monitor for co-occuring movement disorder. Pt's wife is primary caregiver and assist with IADL; has assistance from family friends and pt's band mates as needed. Wife previously connected with dementia care management team, last connected in 2022. Can place new referral if wife is interested in getting reconnected. Recommend pt continue to refrain from driving. Wife concerned with pt's decreased appetite and preference for sweets. Plan to follow-up with Dr. Larry in October. In the interim, can consider adding in meal supplement shakes.    1. Major neurocognitive disorder due to multiple etiologies            PLAN:     RTC in October for f/u with Dr. Lrary  Will discuss referral to reconnect with dementia care management team for caregiver support. They may be able to assist with strategies to divert scam calls from pt's cell phone. If they are concerned about pt wandering, can turn on GPS tracker on his  "phone.   Family and friends providing adequate oversight and are encouraged to continue managing medications, finances, driving, meal preparation.   Pt should continue to not drive.      Thank you for allowing me to participate in Mr. Alatorre's care.  If you have any questions, please contact me.    Romeo Early, Ph.D.  Postdoctoral Fellow in Clinical Neuropsychology  [facility] - Department of Neurology    Carie Gonzalez PsyD  Licensed Clinical Neuropsychologist  [facility] - Department of Neurology      HISTORY:     HPI: Mr. Brent Alatorre is an 89 y.o. male with past medical history of anemia, bladder cancer, cataracts, glaucoma, CKD3, HTN, MCI, and primary osteoarthritis. Pt was referred for evaluation of cognitive changes. Pt is accompanied today by his wife (Magui).     Pt first seen by neurology in June 2020 for cognitive symptoms, specifically with memory. MoCA at the time was 22/30. Subsequent neurology visits documented progressive memory decline, though he has stayed largely independent with ADLs. 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 Recent reversible labs unremarkable.    Pt was hospitalized in July 2024 for syncope. Records indicate pt did not eat breakfast the morning of the syncopal event and loss consciousness while at mass - non-responsive for 15 minutes, conscious for several minutes after, and then another syncopal episode for 5 minutes where he woke to sternal rub by EMS. Family that witnessed syncopal episodes stated pt did not fall or hit his head. Neuroimaging documented "CTH with fullness that could represent a saccular aneurysm. CTA head showed no aneurysmal dilatation, dissection or flow-limiting stenosis." One      Resides: with wife  Caregiver: " Wife  Level of supervision: someone is always with pt at the home. If wife has to leave, musician friends come over and play instruments. Wife is concerned that he would wander if left alone - has wandered once about 1.5 years ago.   Sitter/HHA: No  POA: Yes - wife  Medications for cognition: None    Current Cognitive Symptoms:  Symptoms: Reports more difficulty with recent memory, pt repeats himself, people have to repeat information to him, sometimes cues are helpful. Sometimes gets confused early in the morning and it can take several minutes to get acclimated. Will walk into a room and forget why. Has slower processing speed. Plays at [employer] and his band mates report he forgets lyrics sometimes   Orientation: Oriented to season and his birthday. Stated the weekday was Monday (its Thursday). He was unsure of the year, month, or date.   Course: Wife reports gradual onset cognitive sxs since about 2019 that have worsened over time.     Current Neurobehavioral Symptoms:  Depression: No   Anxiety: No  Paranoia/Delusions: No  Hallucinations: No  Agitation: Yes - gets agitated when he can't hear but otherwise mood is good.   Apathy/Indifference: No   Disinhibition/Impulsivity: No  Motor/repetitive behaviors: No  Substance Use: Yes - Smokes marijuana 1x/week. Wife reports he cut down on marijuana use in the last year, was smoking daily.      Current Physical Symptoms: See Dr. Oden's note for physical and neuro exam  Swallowing: No  Incontinence/Constipation: No  Ambulation: Independent  Falls: Yes - had one fall in the past 3 months, fell down 4 steps. He did hit his head on the floor but got up after. Did not seek medical attention following the fall.   Sleep: Good - sleeps from 8pm-8am. Wakes 1-2x/per night due to nocturia. Wife reports pt occasionally talks in his sleep, started within the last year.  Appetite change: Yes - wife reports its difficult to get pt to eat. He eats very small  portions. Reports he's lost 7 lbs over the last 2-3 months. Reports appetite has changed over last 10 years, eats more sweets but not eating full meal.    Movement Sx: No parkinsonism found on Dr. Oden's neurological exam.  Repeat UTI/Delirium: No   Sensory: Yes - pt has hearing loss. Got hearing aids about 3 years ago but doesn't wear them consistently. Has an upcoming audiology appt. Wife reports pt has loss his sense of smell. No changes with vision.     ADL:  Bathing/Grooming: Independent - wife reports he needs reminders to shower sometimes  Feeding: Independent  Dressing: Wife has picked out his clothes for the last 7 years, states he and his band friends stopped wearing a uniform about 7 years ago so she started picking out his clothes so he would look nice. Pt puts clothes on independently.  Toileting: Independent    IADL:  Finances: Wife has always managed  Medications: Wife puts medication in the pillbox and has to prompt pt when it's time to take them. Wife manages all pt's medical appointments.  Driving: Stopped driving a couple of years ago - was having difficulty staying between the lanes, getting lost while driving  Household: Wife has always managed.          7/21/2025     4:07 PM   IADL   Ability to Use Telephone Answers telephone, but does not dial   Shopping Needs to be accompanied on any shopping trip   Food Preparation Needs to have meals prepared and served   Housekeeping Performs light daily tasks such as dishwashing, bed making   Laundry All laundry must be done by others   Mode of Transportation Travel limited to taxi or automobile with assistance of another   Responsibility for Own Medications Is not capable of dispensing own medication   Ability to Handle Finances Incapable of handling money          Safety Concerns:  Hygiene: No  Nutrition: Yes - pt has decreased appetite. Wife reports he's lost 7 lbs in the last 2-3 months  Falls: Yes - one fall in the last year, as mentioned  "above  Wandering: Yes - wife reports pt wandered once so she does not leave him home alone.   Financial scams: Yes - wife states pt feels he should answer every phone call, even if it says "spam risk." Reports he has given his address to a fake police association who called in the last 6 months and that he now gets mail from them.    Medication management: No  Driving: No  Cooking: No  Medical decision making: No  Physical aggression: No    Neurologic History:  TBI: No - fall mentioned above with head strike but no concussion symptoms reported.   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  Family History of dementia: Yes - sister has dementia (she is currently 89). Pt forgot sister has dementia and that she is in a nursing home.   Recent Hospitalizations/surgeries: No    Social History:  Family Status:  16 years with 2 adult children (son in [place] and daughter in [place])   Daily Activities: playing and singing, playing chess  Educational Level: Completed 11th grade. Stopped to play music full time. Denied any learning/attention difficulties.   Occupational Status and History: Musician, plays flute and winds    PMH:   Mr. Brent Alatorre  has a past medical history of Anemia, Bladder cancer, Cancer, Cataracts, bilateral, Colon polyp, Glaucoma suspect of both eyes, History of kidney problems, Hypertension, and Primary osteoarthritis of both hands (11/29/2017).      Current Outpatient Medications:     acetaminophen (TYLENOL) 500 MG tablet, Take 500 mg by mouth as needed., Disp: , Rfl:     alendronate (FOSAMAX) 70 MG tablet, Take 1 tablet (70 mg total) by mouth every 7 days., Disp: 12 tablet, Rfl: 3    ferrous sulfate 325 (65 FE) MG EC tablet, Take by mouth., Disp: , Rfl:     multivitamin capsule, Take 1 capsule by mouth once daily., Disp: , Rfl:     solifenacin (VESICARE) 10 MG tablet, Take 1 tablet (10 mg total) by mouth once daily., Disp: 90 " tablet, Rfl: 3    albuterol (VENTOLIN HFA) 90 mcg/actuation inhaler, Inhale 2 puffs into the lungs every 4 (four) hours as needed for Wheezing. Rescue (Patient not taking: Reported on 7/17/2025), Disp: 18 g, Rfl: 0    benzonatate (TESSALON) 200 MG capsule, Take 200 mg by mouth. (Patient not taking: Reported on 7/17/2025), Disp: , Rfl:     benzonatate (TESSALON) 200 MG capsule, Take 1 capsule (200 mg total) by mouth 3 (three) times daily as needed for Cough. (Patient not taking: Reported on 7/17/2025), Disp: 30 capsule, Rfl: 0    clotrimazole (LOTRIMIN) 1 % cream, Apply topically once daily. (Patient not taking: Reported on 6/19/2025), Disp: 60 g, Rfl: 1    ipratropium (ATROVENT) 21 mcg (0.03 %) nasal spray, 2 sprays by Each Nostril route 2 (two) times daily. (Patient not taking: Reported on 7/17/2025), Disp: 30 mL, Rfl: 0    Results for orders placed or performed during the hospital encounter of 11/15/23   CT Head Without Contrast    Narrative    EXAMINATION:  CT HEAD WITHOUT CONTRAST    CLINICAL HISTORY:  Mental status change, unknown cause;    TECHNIQUE:  Low dose axial CT images obtained throughout the head without intravenous contrast. Sagittal and coronal reconstructions were performed.    COMPARISON:  MRI brain from 08/24/2021.    FINDINGS:  Ventricles and sulci are normal in size for age without evidence of hydrocephalus. No extra-axial blood or fluid collections.  There is hypoattenuation the supratentorial white matter compatible with chronic microvascular ischemic changes, stable.  There are hypodensities in the right basal ganglia suspicious for remote lacunar infarctions.  No parenchymal mass, hemorrhage, edema or major vascular distribution infarct.    No calvarial fracture.  The scalp is unremarkable.  There is near complete opacification of the left maxillary sinus.  Remaining paranasal sinuses and mastoid air cells are clear.      Impression    No acute intracranial abnormality.      Electronically  "signed by: Isreal Lechuga  Date:    11/15/2023  Time:    21:33   Results for orders placed or performed during the hospital encounter of 08/24/21   MRI Brain Without Contrast    Narrative    EXAMINATION:  MRI BRAIN WITHOUT CONTRAST    CLINICAL HISTORY:  memory;  Other amnesia    TECHNIQUE:  Sagittal and axial T1, axial T2, axial FLAIR, axial gradient and axial diffusion imaging of the whole brain without contrast.    COMPARISON:  None    FINDINGS:  There is generalized cerebral volume loss with compensatory enlargement ventricles sulci and cisterns without hydrocephalus.  There is patchy and confluent T2 FLAIR signal hyperintensity supratentorial white matter and central nile without corresponding diffusion signal abnormality.  Configuration while nonspecific most suggestive for mild moderate degree of chronic ischemic change.  There is no restricted diffusion to suggest acute or recent infarction.  The major intracranial T2 flow voids are present.  No abnormal parenchymal susceptibility to suggest parenchymal hemorrhage.  Small caliber pituitary tissue along the floor of the sella without definite expansion of the sella to suggest empty sella.    Punctate encephalomalacia of the right superior cerebellum suggestive for remote infarction.      Impression    Cerebral volume loss with patchy and confluent T2 FLAIR signal abnormality supratentorial white matter and nile while nonspecific concerning for sequela of chronic ischemic change    Punctate remote right cerebellar infarction.    No evidence for acute infarction.    Please see above for additional details.      Electronically signed by: Tim Caldera DO  Date:    08/24/2021  Time:    10:26       Pertinent Labs:  Lab Results   Component Value Date    SWAASTDT85 1284 (H) 12/09/2021    FOLATE 17.8 12/09/2021    TSH 1.116 07/03/2025    HGBA1C 5.6 06/19/2018     No results found for: "PTAU", "ADEVLPHOSTAU", "HPGO970", "ADEVLTOTAL", "[id number]", "NEUROLGTCHN", " ""APGTPE"    OBJECTIVE:     MENTAL STATUS AND BEHAVIORAL OBSERVATIONS:  Appearance:  Casually dressed and Well groomed  Behavior:   alert, calm, cooperative, rapport easily established, and Appropriate interpersonal skills. Good interpretation of nonverbal cues.   Orientation:   Oriented to season, month, and general location. Disoriented to year, date, and weekday.  Sensory:   Pt is hard of hearing but adequate for testing, not wearing hearing aids. Vision adequate for testing.   Gait:   Pt ambulates independently.   Psychomotor:  Within Normal Limits  Speech:  Fluent and spontaneous. Normal volume, rate, pitch, tone, and prosody.  Language:  Receptive and expressive language appear intact. Comprehends conversational speech., No evidence of word-finding difficulties in conversational speech.  Mood:   euthymic  Affect:   mood-congruent  Thought Process: goal directed and linear  Thought Content: Denied current SI/HI. and No evidence of psychotic symptoms.  Memory:  limited historian, repeats self in interview, Remote impaired, and Recent impaired  Attn/Concentration:  Normal  Judgment/Insight: Poor  Validity:   Valid, PVT WNL. Hearing impairment may have impacted test performance.   Test Taking Bx: Performance on standalone and/or embedded performance validity measures all suggested adequate engagement.   Behaviorally, the pt appeared to put forth good effort, and cognitive test scores are generally commensurate with their overall functional level.   As a result, scores are considered a valid reflection of the pt's functioning.  Pt required frequent repetition of instructions.  Test Taking Bx:         Per psychometrist observations, pt mumbled occasionally throughout testing. He sometimes thought aloud during tests. For example, during clock drawing pt stated, "Johnny, try it better, don't be a dummy."    PROCEDURES/TESTS ADMINISTERED:  In addition to performing a review of pertinent medical records, reviewing " limits to confidentiality, conducting a clinical interview, and explaining procedures, the following measures were administered and scored using normative data and administration instructions from Makenzie et al, 2019:   Mini Mental Status Exam (MMSE); Modified Mini Mental Status Exam (3MS); Animal Fluency; Letter F Fluency; [place] Verbal Learning Test - Short Form (CVLT-SF); Clock Drawing; Trail Making Test; Consortium to Establish a Registry for Alzheimer's Disease (CERAD) - Constructional Praxis; [place] Naming Test - 15 Item; Wechsler Adult Intelligence Scale - Third Edition, Digit Span subtest; and Geriatric Depression Scale - 15 item. CERAD Praxis Recall was scored using norms from Cheryl et al, 2011. Pt was also administered the [place] Nazario IADL scale and caregiver filled out the Neuropsychiatric Inventory Questionnaire (NPI-Q). Frontal Assessment Battery (EDER)      NEUROPSYCHOLOGICAL ASSESSMENT RESULTS:  The following should not be interpreted in isolation from the neuropsychological evaluation report.  Scores on stand-alone and/or embedded performance validity measures were within normal limits.    COGNITIVE SCREENING Raw Score Type of Standardized Score Standardized Score Percentile/CP Descriptor   MMSE 21 Tscore 14 <1 Exceptionally Low    Orientation - Place 3/5 - - - -   Orientation - Date 2/5 - - - -   3MS  61 z-score <13 <1 Exceptionally Low    LANGUAGE FUNCTIONING Raw Score Type of Standardized Score Standardized Score Percentile/CP Descriptor   BNT-15 11 Tscore -1 <1 Exceptionally Low    Letter F  5 Tscore 34 5 Below Average   Animal Naming 7 Tscore 31 3 Below Average   VISUOSPATIAL FUNCTIONING Raw Score Type of Standardized Score Standardized Score Percentile/CP Descriptor   Clock Total 5 Tscore 46 34 Average   CERAD Constructional Praxis 7 Tscore 36 9 Below Average   LEARNING & MEMORY Raw Score Type of Standardized Score Standardized Score Percentile/CP Descriptor   CVLT-II Short          Trials 1-4  22 zscore -0.8 - Low Average   Trial 1 5 zscore 0.2 58 Average   Trial 4 6 zscore -1.2 12 Low Average   SDFR 3 zscore -3.3 <1 Exceptionally Low    LDFR 1 zscore -2.8 <1 Exceptionally Low    LDCR 4 zscore -1.8 4 Below Average   Recognition Hits 6 zscore -2.3 1 Exceptionally Low    False Positives 10 zscore -5.9 <1 Exceptionally Low    Forced Choice 8 - - - -   Praxis Recall 0 Tscore 30 2 Below Average   ATTENTION/WORKING MEMORY Raw Score Type of Standardized Score Standardized Score Percentile/CP Descriptor   WAIS-III Digit Span 11 ss 0 <1 Exceptionally Low          DS Forward 8 - - - -         DS Backward 3 - - - -   MENTAL PROCESSING SPEED Raw Score Type of Standardized Score Standardized Score Percentile/CP Descriptor   TMT A  186 Tscore <13 <1 Exceptionally Low    TMT A Total Err. 1 - - - -   EXECUTIVE FUNCTIONING Raw Score Type of Standardized Score Standardized Score Percentile/CP Descriptor   TMT B D/C Time 290 - - - -   TMT B Seq Err. 0 - - - -   TMT B Set-Loss Err. 1 - - - -   TMT B Time-DC Err. 18 - - - -   EDER 13 zscore -0.64 27 Average   MOOD & PERSONALITY Raw Score Type of Standardized Score Standardized Score Percentile/CP Descriptor   GDS-15 0 - - - WNL   ss = scaled score (mean = 10, SD = 3); SS = standard score (mean = 100, SD = 15); Tscore mean = 50, SD = 10; zscore (mean = 0.00, SD = 1); WNL = Within Normal Limits       PROCESS NOTES:    Performance was below expectation on a global cognitive screener. Pt lost points for: orientation (-5), delayed recall (-3), and repetition (-1).     Verbal fluency was below expectation. Confrontation naming was notable for paraphasic error and circumlocution.     Drawing of a clock was WNL. Copy of geometric figures was below expectation, with more difficulty complexity increased.     Pt demonstrated a flat learning curve (5,6,5,6/9). He recalled 50% of initially encoded information after a brief delay and 16% after a longer delay with benefit from  semantic cues. Performance on the associated recognition measure was below expectation and notable for yea-say bias and multiple semantically-related and non-related false positive errors. Visual recall was below expectation. Memory consolidation is considered impaired overall.     Attention, working memory, and processing speed are below expectation.     Performance was below expectation on a mental flexibility and set shifting task and the test was discontinued. Performance on a brief executive functioning screener was WNL. Pt lost points for: conceptualization/abstract reasoning (-1), cognitive flexibility (-2), inhibitory control (-2).     He did not endorse clinically significant depression symptoms on a self-report measure. Pt's wife endorsed moderate severity of appetite/eating disturbance on a standard inventory of neuropsychiatric symptoms.           7/21/2025     4:08 PM   NPIQ RFS   WHO IS FILLING OUT FORM? Caregiver   Does this patient have false beliefs, such as thinking that others are stealing from him/her or planning to harm him/her in some way? No   Does this patient have hallucinations such as false visions or voices? Shine she/he seem to hear or see things that are not present? No   Is the patient resistive to help from others at times, or hard to handle? No   Does the patient seem sad or say that he/she is depressed? No   Does the patient become upset when  from you? Does he/she have any other signs of nervousness such as shortness of breath, sighing, being unable tor elax, or feeling excessively tense? No   Does the patient appear to feel good or act excessively happy? No   Does this patient seem less interested in his/her usual activities or in the activities and plans of others? No   Does this patient seem to act cumpolsively, for example, talking to strangers as if she/he knows them, or saying things that may hurt people's feelings? No   Is the patient impatient and cranky? Does he/she  have difficulty coping with delays or waiting for planned activities? --   Does the patient engage in repetitive activities such as pacing around the house, handling buttons, wrapping string, or doing other things repeatedly? No   Does this patient awaken you during the night, rise too early in the morning, or take excessive naps during the day? No   Has the patient lost or gained weight, or had a change in the type of food he/she likes? Yes   Apetitie/Eating Severity 2   Apetite/Eating Distress 4   NPI Total Severity Score 2   NPI Total Distress Score 4         Billing/Services Summary          Neurobehavioral Status Exam Base Code (60017)  Total Units: 1 Add-On (82034)  Total Units: 0   Face-to-face Total Time: 45 min.    Report Writing Total Time: 0 min         [facility] Base Code (86331)   Total Units: 1 Add-on (73267)  Total Units: 2   Referral review/initial test selection 15 min.    Intra-session Clinical Decision-Making 0 min.         Tech consult/test review/modification 5 min.         Patient behavior management 0 min.    Face-to-face Interpretive Feedback 60 min.    Record Review/Integration/Report Generation 120 min.     Total Time: 200 min.         Test Administration by Technician  Technician Name: RENEA Vazquez Base Code (85489)   Total Units: 1 Add-on (19421)  Total Units: 2   Face-to-Face Testin min.    Scoring 40 min.     Total Time: 104 min.    DOS is the date of the evaluation unless specified

## 2025-07-17 NOTE — PROGRESS NOTES
Name: Brent Alatorre  MRN: [id number]   CSN: [id number]      Date: 7-17-25       Referring physician:  No referring provider defined for this encounter.    Subjective:        Chief Complaint: memory loss     History of Present Illness (HPI):    Brent Alatorre is a 89 y.o. right-handed male with HTN and CKD 3 presents today for an initial evaluation of memory loss and is accompanied by wife. He was previously seen for cognitive impairment by Dr. Gonsalves 1-4-24 and prior to this by Dr. Garcia. They have previously worked with our , Charo Dexter LCSW.    PET at [place]  Two episodes of syncope July 2024.   Syncope after did not eat breakfast and was at [place]  Once at home.     Memory   Wife started noting changes in about 2019. It has gradually worsened.   He is aware that he cannot remember things he used to remember.   If you tell me something now I might forget it.   Wife says he is sometimes repetitive.   He does not get disoriented but perhaps a little confused. Might not know why he walked into a room. She is more aware of this in the AM. May take a little time to get acclimated.    Always knows he is in his home.     He feels that part of his problem is hearing. He has them but has not been wearing. Going back this month to reacquaint with aids. He has had a 3 years. Never really wore them consistently. Did lose them a couple of times.     Plays at [employer]. They know he is not hearing well. He may forget the lyrics but never forgets how to play the tune.       Social History:  Marriedx 16 years    She is his medical POA.     Living Environment:  Dwelling- House  Lives with spouse  Firearms- No    He is almost never left alone. If she has to go out, she will call in some other musicians to come in and play music with them.  He did wander off once so she would worry about this if he were alone. This was about 1.5 years ago.     Plays winds and flute.   Plays regularly.      Children:  Children -2  Dtr in [place] and son in [facility].     Education:  Completed 11th grade.     Occupation:  Musician. Still plays.     Smoke History:  Never smoked    Alcohol intake:  Does not drink.     Illicit drugs:  Smoke a little marijuna once a week now.  Last time was couple days ago.   He reduced intake about a year ago. He was smoking marijuana daily prior to this.     iADLs  Driving:  No longer drive.   Stopped a couple of years ago.   He was having difficulty staying in his suresh. He called her and said he was lost and took while to get back home.     Meal Prep/Planning:  Dependent  He has not cooked for 15 years.     Managing Finances:  Wife has always managed.     Scammed: Never been scammed but he thinks every call needs to be answered, even if it says [place].    Medication Administration:  Spouse sets up  and Uses pill boxes  She will prompt him to take. Otherwise, he may not take.     Managing Appointments:  DEPENDENT    Housekeeping/ Laundry/ Yard work:   Wife typically manages and mostly always has.         ADLs  Bathing: Independent but sometimes needs reminders    Dressing: Wife has picked out his clothes for the past 7 years or more. She started doing this as she wants him to look presentable. She says his choices were not bizarre but a little off. He is able to dress self.     Toileting: Independent    Eating: Independent        Mood: No issues  Wife says he has an exceptionally good mood.   Almost never angry or anxious.   Never worries.       Behavior:   Sometimes gets a little agitated with hearing.   No paranoia.     Hallucinations: Denies    Swallowing: No issues    Tremor: No     Ambulation: No difficulty   Fell down about 4 steps within past 3 mos. He does not recall this.   Wife says he hit his head a little bit- head slightly hit floor. He jumped right back up.   No changes in memory after this.     Sleep: No problems  Typical sleep pattern in 8p-8a.   He may get up to use restroom  "1-2 times but goes back to sleep.  Recently noted he has talked in sleep. Occasional only. She feels this is relatively new.       He likes to play chess and music.   He was on cover of [employer].     Sister, Babita, has dementia. She is 89.            Review of Systems   Constitutional:  Positive for appetite change (eats very little, difficult to get him to eat. He has been a picky eater for about 10 years. Eating more sweets than he probably should.) and unexpected weight change (lost 7 lbs in past couple of mos).   HENT:  Positive for hearing loss. Negative for trouble swallowing.         Taste intact.   Wife notes his sense of smell is not as good.    Eyes:  Negative for visual disturbance.   Respiratory:  Negative for cough and choking.    Gastrointestinal:  Negative for constipation.   Genitourinary:         Denies accidents    Neurological:  Negative for seizures.        No stroke.        Past Medical History: The patient  has a past medical history of Anemia, Bladder cancer, Cancer, Cataracts, bilateral, Colon polyp, Glaucoma suspect of both eyes, History of kidney problems, Hypertension, Pneumonia due to infectious organism (12/20/2024), and Primary osteoarthritis of both hands (11/29/2017).    Social History: The patient  reports that he has never smoked. He has never been exposed to tobacco smoke. He has never used smokeless tobacco. He reports current alcohol use of about 3.0 standard drinks of alcohol per week. He reports current drug use. Frequency: 4.00 times per week. Drug: Marijuana.    Family History: Their family history includes Cancer in his brother, brother, and father; Diabetes in his mother.    Allergies: Sulfa (sulfonamide antibiotics)     Meds: Medications Ordered Prior to Encounter[1]    Objective:     Physical Exam:    Vitals:    07/17/25 0850   BP: 129/89   Pulse: 93   Weight: 53.8 kg (118 lb 9.7 oz)   Height: 5' 4" (1.626 m)     Body mass index is 20.36 kg/m².    Constitutional  " Well-developed, well-nourished, appears stated age   Cardiovascular  no LE edema bilaterally     ..  * Cranial nerves       - CN II  PERRL, visual fields full to confrontation     - CN III, IV, VI  Extraocular movements full, normal pursuits and saccades     - CN V  Sensation V1 - V3 intact     - CN VII  Face strong and symmetric bilaterally     - CN VIII  [place]key     - CN IX, X  Palate raises midline and symmetric     - CN XI  SCM and trapezius 5/5 bilaterally     - CN XII  Tongue midline   * Motor  Muscle bulk normal, strength 5/5 throughout   * Coordination  No dysmetria with finger-to-nose    * Gait  See below.   * Deep tendon reflexes  1+ and symmetric throughout     Repetitive   Language spontaneous.   ..  * Specialized movement exam  No hypophonic speech.    No facial masking.     No cogwheel rigidity.       Subtle kyra RH (FT, HG, PS) but no decrementing.  BTT normal.         No tremor with rest, posture, kinesis, or intention.      No other dystonia, chorea, athetosis, myoclonus, or tics.      Arises without push.   No motor impersistence.   Normal-based gait.   No shortened stride length.  Reduced R arm swing.             PET CT Brain Metabolic Evaluation  Order: [id number]  Impression    Findings most suggestive of early neurodegenerative changes prominently involving the  frontotemporal lobes. A component of underlying metabolic/inflammatory disease cannot be excluded.  Narrative    EXAM:  PET CT BRAIN METABOLIC EVALUATION    Serum glucose at time of F-18 FDG injection was 70 mg/dL.    RADIOPHARMACEUTICAL/MEDS:  Route: intravenous  fludeoxyglucose F 18 injection USP (FDG F-18),12.03 millicurie    TECHNIQUE: F-18 FDG PET/CT scan was performed of the brain with low dose, non-contrast,  free-breathing CT images for attenuation correction and anatomic localization (AC/AL), with imaging  beginning at approximately 30 minutes after radiotracer injection. Cortex ID performed.    COMPARISON:  11/28/2022 MRI brain  dementia.    INDICATION:  Question underlying Alzheimer's versus frontotemporal dementia.    FINDINGS:  Patchy FDG hypometabolism scattered throughout the cerebral and cerebellar hemispheres.    The frontal lobes, anterior cingulate and temporal lobes are slightly more prominently involved  compared the remainder of the CNS.    The right temporal lobe is more involved than the left. This may correspond to the right temporal  volume loss on MRI. Symmetric normal metabolism within the basal ganglia.  Images on Order [id number]    Action Required        [facility] requires the patient's authorization before retrieving this document.    Collect Patient Authorization   Scan on 11/30/2022: PET CT Brain Metabolic Evaluation    Exam End: 11/30/22 09:35    Specimen Collected: 11/30/22 09:20          Imaging:             Results for orders placed or performed during the hospital encounter of 11/15/23   CT Head Without Contrast    Narrative    EXAMINATION:  CT HEAD WITHOUT CONTRAST    CLINICAL HISTORY:  Mental status change, unknown cause;    TECHNIQUE:  Low dose axial CT images obtained throughout the head without intravenous contrast. Sagittal and coronal reconstructions were performed.    COMPARISON:  MRI brain from 08/24/2021.    FINDINGS:  Ventricles and sulci are normal in size for age without evidence of hydrocephalus. No extra-axial blood or fluid collections.  There is hypoattenuation the supratentorial white matter compatible with chronic microvascular ischemic changes, stable.  There are hypodensities in the right basal ganglia suspicious for remote lacunar infarctions.  No parenchymal mass, hemorrhage, edema or major vascular distribution infarct.    No calvarial fracture.  The scalp is unremarkable.  There is near complete opacification of the left maxillary sinus.  Remaining paranasal sinuses and mastoid air cells are clear.      Impression    No acute intracranial abnormality.      Electronically signed  by: Isreal Lechuga  Date:    11/15/2023  Time:    21:33   Results for orders placed or performed during the hospital encounter of 08/24/21   MRI Brain Without Contrast    Narrative    EXAMINATION:  MRI BRAIN WITHOUT CONTRAST    CLINICAL HISTORY:  memory;  Other amnesia    TECHNIQUE:  Sagittal and axial T1, axial T2, axial FLAIR, axial gradient and axial diffusion imaging of the whole brain without contrast.    COMPARISON:  None    FINDINGS:  There is generalized cerebral volume loss with compensatory enlargement ventricles sulci and cisterns without hydrocephalus.  There is patchy and confluent T2 FLAIR signal hyperintensity supratentorial white matter and central nile without corresponding diffusion signal abnormality.  Configuration while nonspecific most suggestive for mild moderate degree of chronic ischemic change.  There is no restricted diffusion to suggest acute or recent infarction.  The major intracranial T2 flow voids are present.  No abnormal parenchymal susceptibility to suggest parenchymal hemorrhage.  Small caliber pituitary tissue along the floor of the sella without definite expansion of the sella to suggest empty sella.    Punctate encephalomalacia of the right superior cerebellum suggestive for remote infarction.      Impression    Cerebral volume loss with patchy and confluent T2 FLAIR signal abnormality supratentorial white matter and nile while nonspecific concerning for sequela of chronic ischemic change    Punctate remote right cerebellar infarction.    No evidence for acute infarction.    Please see above for additional details.      Electronically signed by: Tim Caldera DO  Date:    08/24/2021  Time:    10:26       MOCA  2022= 22/30      Assessment and Plan     Major neurocognitive disorder due to multiple etiologies        Medical Decision Making:    Good support system.   No pdism.  No behavior or sleep concerns.   Follow up October. Non testing. Will need to meet Dr. Larry.   Wife  concerned about reduced appetite and weight loss, preference for sweets.         ..Total time: 58 minutes spent on the encounter, which includes face to face time and non-face to face time preparing to see the patient (eg, review of tests), Obtaining and/or reviewing separately obtained history, Documenting clinical information in the electronic or other health record, Independently interpreting results (not separately reported) and communicating results to the patient/family/caregiver, or Care coordination (not separately reported).       ..      Cecille Oden DNP, NP-C  Division of Movement and Memory Disorders  [facility]  [phone]             [1]   Current Outpatient Medications on File Prior to Visit   Medication Sig Dispense Refill    acetaminophen (TYLENOL) 500 MG tablet Take 500 mg by mouth as needed.      ferrous sulfate 325 (65 FE) MG EC tablet Take by mouth.      multivitamin capsule Take 1 capsule by mouth once daily.      solifenacin (VESICARE) 10 MG tablet Take 1 tablet (10 mg total) by mouth once daily. 90 tablet 3    albuterol (VENTOLIN HFA) 90 mcg/actuation inhaler Inhale 2 puffs into the lungs every 4 (four) hours as needed for Wheezing. Rescue (Patient not taking: Reported on 7/17/2025) 18 g 0    clotrimazole (LOTRIMIN) 1 % cream Apply topically once daily. (Patient not taking: Reported on 6/19/2025) 60 g 1     No current facility-administered medications on file prior to visit.

## 2025-07-29 ENCOUNTER — OFFICE VISIT (OUTPATIENT)
Dept: NEUROLOGY | Facility: CLINIC | Age: OVER 89
End: 2025-07-29
Payer: MEDICARE

## 2025-07-29 DIAGNOSIS — F02.80 MAJOR NEUROCOGNITIVE DISORDER DUE TO MULTIPLE ETIOLOGIES: Primary | ICD-10-CM

## 2025-07-29 PROCEDURE — 99999 PR PBB SHADOW E&M-EST. PATIENT-LVL II: CPT | Mod: PBBFAC,,, | Performed by: PSYCHIATRY & NEUROLOGY

## 2025-07-29 PROCEDURE — 99499 UNLISTED E&M SERVICE: CPT | Mod: S$GLB,,, | Performed by: PSYCHIATRY & NEUROLOGY

## 2025-07-29 NOTE — PATIENT INSTRUCTIONS
Summary of the evidence on modifiable risk factors for cognitive decline and dementia             From: Zuly, Alex Howell, Efren, Yamilka & Pam (2015). Summary of the evidence on modifiable risk factors for cognitive decline and dementia: A population-based perspective. Alzheimer's & Dementia, 11, 138-619.    *Studies suggest small or moderate alcohol consumption (no more than 1 drink per day) by older individuals may decrease the risk of cognitive decline and dementia. The evidence is not strong enough, however, to suggest those who do not drink should start drinking, especially when weighed against the potential negative effects of excessive alcohol consumption, such as an increased risk of falls among older adults. Research also generally suggests that red wine may be the best form of alcohol for cognitive functioning, in part, due to its antioxidant effects. All alcohol use is cautioned, though, as alcohol could cause harmful effects and interfere with medications. A physician and/or pharmacist should be consulted before alcohol is consumed.          Behaviors to Promote Brain Health       Exercise regularly - Exercise has many known benefits, and it appears that regular physical activity benefits the brain. Multiple research studies show that people who are physically active are less likely to experience a decline in their mental function and have a lower risk of developing Alzheimer's disease. We believe these benefits are a result of increased blood flow to your brain during exercise. It also tends to counter some of the natural reduction in brain connections that occur during aging, in effect reversing some of the problems. Aim to exercise several times per week for 30-60 minutes. You can walk, swim, play tennis or any other moderate aerobic activity that increases your heart rate.    Eat a Mediterranean diet - Your diet plays a large role in your brain health. Consider following a Mediterranean  diet, which emphasizes plant-based foods, whole grains, fish and healthy fats, such as olive oil. It incorporates much less red meat and salt than a typical American diet. Studies show people who closely follow a Mediterranean diet are less likely to have Alzheimer's disease than people who don't follow the diet. Omega fatty acids found in extra-virgin olive oil and other healthy fats are vital for your cells to function correctly, appear to decrease your risk of coronary artery disease, and increase mental focus and slow cognitive decline in older adults.       Stay mentally active - Your brain is similar to a muscle -- you need to use it or you lose it. There are many things that you can do to keep your brain in shape, such as doing crossword puzzles or Sudoku, reading, playing cards or putting together a jigsaw puzzle. Consider it cross-training your brain. So incorporate different activities to increase the effectiveness.     Stay socially involved - Social interaction helps mcnamara off depression and stress, both of which can contribute to memory loss. Look for opportunities to connect with loved ones, friends and others, especially if you live alone. There is research that links solitary confinement to brain atrophy, so remaining socially active may have the opposite effect and strengthen the health of your brain.    Get plenty of sleep - Sleep plays an important role in your brain health. There are some theories that sleep helps clear abnormal proteins in your brain and consolidates memories, which boosts your overall memory and brain health. It is important that you try to get seven to eight consecutive hours of sleep per night, not fragmented sleep of two- or three-hour increments. Consecutive sleep gives your brain the time to consolidate and store your memories effectively.        Heart Health and Brain Health    What's bad for your heart is bad for your brain!    In order to keep your brain healthy, you  need to keep your heart healthy. Your brain needs more oxygen than any other organ in your body, and it depends on a healthy cardiovascular system to deliver oxygen and nutrients to brain cells. Brain blood vessels are particularly susceptible to damage due to high blood pressure, which can lead to scarring and narrowing of the vessels over time. Eventually, parts of the brain tissue itself may become damaged when it lacks access to oxygen and nutrients. This damage may begin in midlife (ages 45-65) and can make your brain slower and less efficient. The damaged brain tissue is also more vulnerable to stroke or developing a neurodegenerative condition, such as Alzheimer's disease.  If you are a smoker, the risk is even higher, as smoking will also damage delicate brain blood vessels.     We have very high rates of hypertension in [place], and it is frequently associated with subjective cognitive decline (SCD).         2015 Behavioral Risk Factor Surveillance System. Prepared by the Alzheimers Association    In order to promote good cognitive and brain health, it is important to prevent, delay, and manage conditions that can impact blood flow to the brain, such as:  High blood pressure  High cholesterol   Diabetes  Coronary artery disease  Obesity  Atrial fibrillation   Sleep apnea    Ways to promote good cardiovascular and brain health include:   Follow up regularly with your doctor and take your medications as directed   Eat a healthy diet  Get regular exercise  Quit smoking  Limit alcohol consumption       Information adapted from:  https://www.alz.org/media/Documents/healthy-brain-initiative-protecting-heart-[email or web address]-brain.pdf         Caregiver Recommendations:  In the early stages of dementia, a person may still look and sound very much the same, but it is important to remember that things are slowly becoming more difficult for them. This can be subtle at first - perhaps tasks are taking longer, the person is  getting distracted easily and not finishing things, or they are making inattentive mistakes. It is easy to attribute these behaviors to carelessness, which can cause frustration in the spouse or caregiver. Remember that eventually it will not be possible for the patient to function at their previous level. Set realistic goals and learn to expect the unexpected. Don't set yourself up for failure and frustration by setting unrealistic expectations of your loved one.     Additional tips include:  Do not argue with your loved one. Arguing with your loved one about a forgotten memory will only upset them and further frustrate you. Be willing to let most things go.  Dont underestimate the power of good nutrition. Studies have linked dementia to lifestyle choices, including poor nutrition. Limiting refined sugars and increasing vegetables can help manage behavioral issues.  Give them independence when possible. As tempting as it may be to do everything for your loved one, it is important for them to do as many things as possible by himself or herself, even if you need to start the activity.  Have fun! Your loved one can still have fun. Trips to local museums, [place] and even the zoo can be enjoyed by someone with dementia.  Maintain a current list of medications and dosages of medications. This will ensure you always know when their next dose of medication will be and you will be able to accurately share any medication information with doctors or other caregivers.  Meet your loved one in the now. Dont try to change your loved one back into the person they once were. Grieve the loss of your loved one and then love them as they are right now.  Plan daily time for physical exercise. Its important to focus on the health of your mind, but also your body during this time. Physical exercise can help, especially if you plan time for it each day.  Rely on family members and other loved ones when needed. After everything you have  done to support your loved one with dementia, remember that you also need support for yourself as well. Turn to family members and other loved ones when you need them.  Remember that a dementia diagnosis is not a death sentence. Many people with the disease live more than 20 years following diagnosis. Take advantage of the time you have left with your loved one.  Remember that your loved one can remember emotions even after they forget the actual event that caused those emotions. Your actions and words matter!  Remember the person is more than the disease. When someone is diagnosed with dementia, it can be devastating to them and their loved ones. Hold on to who you know they are, before the diagnosis.  Take a deep breath! Caregiving is a big responsibility but you are doing a great job.  Take care of yourself. When caregivers do not care for themselves they can experience caregiver burnout. Be sure to take a few minutes to yourself every day and join a local or online caregiver support group.  Take immediate action to complete essential documents, like living [place].  The disease is responsible for their mood and personality changes. It can be so hard to watch a loved one change before your eyes. Remember that they are not changing, but the disease is progressing.  Understand your own emotional and physical limitations. Act accordingly to avoid caregiver burnout.  Use every method of communication to reach your loved one through the disease. Art, music and reading are all ways to connect with your loved one when verbal expression is no longer an option. Even a simple touch on their arm can help communicate that they are loved.      Communication Dos and Dont's  DO  Avoid becoming frustrated by empathizing and remembering your loved one cant help their condition. Making them feel safe rather than stressed will make communication easier. Take a short break if you feel your fuse getting short.  Keep communication  short, simple, and clear. Give one direction or ask one question at a time.  Tell your loved one who you are if there appears to be any doubt.Call your loved one by name.  Speak slowly. Your loved one may take longer to process whats being said.  Use closed-ended questions which can be answered yes or no. For example, ask, Did you enjoy the beef at dinner? instead of What did you have for dinner?  Find a different way to say the same thing if it wasnt understood. Try a simpler statement with fewer words.  Use distraction or fib if telling the whole truth will upset the person with dementia. For example, to answer the question, Where is my mother? it may be better to say, Shes not here right now instead of She  20 years ago.  Use repetition as much as necessary. Be prepared to say the same things over and over as the person cant recall them for more than a few minutes at a time.  Use techniques to attract and maintain your loved ones attention. Smile, make eye contact, use gestures, touch, and other body language.  DONT  Ever say things like: Do you remember? Try to remember! Did you forget? How could you not know that?!  Ask questions that challenge short-term memory such as Do you remember what we did last night? The answer will likely be no, which may be humiliating for the person with dementia.  Talk in paragraphs. Instead, offer one idea at a time.  Point out the persons memory difficulty. Avoid remarks such as I just told you that. Instead, just repeat it over and over.  Talk in front of the person as if they werent present. Always include them in any conversation when they are physically present.  Use lots of pronouns such as there, that, those, him, her, it. Use nouns instead. For example, instead of sit there, say: sit in the blue chair.  Use slang or unfamiliar words. The person may not understand the latest terms or phrases.  Use patronizing language or baby  talk. A person with dementia will feel angry or hurt at being talked down to.  Use sarcasm or irony, even if meant humorously. Again, it can cause hurt or confusion.    Develop a Daily Routine:  Having a general daily routine in dementia care helps caregiving run smoothly. These routines wont be set in stone, but they can give a sense of consistency, which is beneficial to the patient even if they cant communicate it.  Keep a sense of structure and familiarity. Try to keep consistent daily times for activities such as waking up, mealtimes, bathing, dressing, receiving visitors, and bedtime. Keeping these things at the same time and place can help orientate the person with dementia.  Let your loved one know what to expect even if you are not sure that they completely understand. You can use cues to establish the different times of day. For example, in the morning you can open the curtains to let sunlight in. In the evening, you can put on quiet music to indicate its bedtime.  Involve your loved one in daily activities as much as theyre able. For example, they may not be able to tie their shoes, but may be able to put clothes in the hamper. Clipping plants in the yard may not be safe, but they may be able to weed, plant, or water.    Communication Strategies:  Early Stage: In the early stage of dementia, an individual is still able to participate in meaningful conversation and engage in social activities. However, he or she may repeat stories, feel overwhelmed by excessive stimulation or have difficulty finding the right word. Tips for successful communication:  Dont make assumptions about a persons ability to communicate because of a dementia diagnosis. The disease affects each person differently.  Dont exclude the person with the disease from conversations.  Speak directly to the person rather than to his or her caregiver or .  Take time to listen to the person express his or her thoughts, feelings  and needs.  Give the person time to respond. Dont interrupt unless help is requested.  Ask what the person is still comfortable doing and what he or she may need help with.  Discuss which method of communication is most comfortable. This could include face-to-face conversation, email or phone calls.  Its OK to laugh. Sometimes humor lightens the mood and makes communication easier.  Dont pull away; your honesty, friendship and support are important to the person  Middle Stage: The middle stage of dementia is typically the longest and can last for many years. As the disease progresses, the person will have greater difficulty communicating and will require more direct care. Tips for successful communication:  Engage the person in one-on-one conversation in a quiet space that has minimal distractions.  Speak slowly and clearly.  Maintain eye contact. It shows you care about what he or she is saying.  Give the person plenty of time to respond so he or she can think about what to say.  Be patient and offer reassurance. It may encourage the person to explain his or her thoughts.  Ask one question at a time.  Ask yes or no questions. For example, Would you like some coffee? rather than What would you like to drink?  Avoid criticizing or correcting. Instead, listen and try to find the meaning in what the person says. Repeat what was said to clarify.  Avoid arguing. If the person says something you dont agree with, let it be.   Offer clear, step-by-step instructions for tasks. Lengthy requests may be overwhelming.  Give visual cues. Demonstrate a task to encourage participation.  Written notes can be helpful when spoken words seem confusing.  Late Stage: The late stage of dementia may last from several weeks to several years. As the disease advances, the person with Alzheimers may rely on nonverbal communication, such as facial expressions or vocal sounds. Around-the-clock care is usually required in this stage.  Tips for successful communication:  Approach the person from the front and identify yourself.  Encourage nonverbal communication. If you dont understand what the person is trying to say, ask him or her to point or gesture.  Use touch, sights, sounds, smells and tastes as a form of communication with the person.  Consider the feelings behind words or sounds. Sometimes the emotions being expressed are more important than whats being said.  Treat the person with dignity and respect. Avoid talking down to the person or as if he or she isnt there.  Its OK if you dont know what to say; your presence and friendship are most important.    Reduce frustrations in daily tasks: A person with dementia might become agitated when once-simple tasks become difficult. To limit challenges and ease frustration:  Schedule wisely. Establish a daily routine. Some tasks, such as bathing or medical appointments, are easier when the person is most alert and refreshed. Allow some flexibility for spontaneous activities or particularly difficult days.  Take your time. Anticipate that tasks may take longer than they used to and schedule more time for them. Allow time for breaks during tasks.  Involve the person. Allow the person with dementia to do as much as possible with the least amount of assistance. For example, he or she might be able to set the table with the help of visual cues or dress independently if you lay out clothes in the order they go on.  Provide choices. Provide some, but not too many, choices every day. For example, provide two outfits to choose from, ask if he or she prefers a hot or cold beverage, or ask if he or she would rather go for a walk or see a movie.  Provide simple instructions. People with dementia best understand clear, one-step communication.  Limit napping. Avoid multiple or prolonged naps during the day. This can minimize the risk of getting days and nights reversed.  Reduce distractions. Turn off the TV  and minimize other distractions at mealtime and during conversations to make it easier for the person with dementia to focus.  Be flexible  Over time, a person with dementia will become more dependent. To reduce frustration, stay flexible and adapt your routine and expectations as needed.  For example, if he or she wants to wear the same outfit every day, consider buying a few identical outfits. If bathing is met with resistance, consider doing it less often.     Create a safe environment: Dementia impairs judgment and problem-solving skills, increasing a person's risk of injury. To promote safety:  Prevent falls. Avoid scatter rugs, extension cords and any clutter that could cause falls. Install handrails or grab bars in critical areas.  Use locks. Install locks on cabinets that contain anything potentially dangerous, such as medicine, alcohol, guns, toxic cleaning substances, dangerous utensils and tools.  Check water temperature. Lower the thermostat on the hot-water heater to prevent burns.  Take fire safety precautions. Keep matches and lighters out of reach. If the person with dementia smokes, always supervise smoking. Make sure a fire extinguisher is accessible and the smoke and carbon monoxide detectors have fresh batteries.        Alzheimer's Disease   Alzheimer's disease is the most common form of dementia. It leads to changes in memory, thinking, and behavior. Alzheimer's disease is progressive, and eventually, these symptoms become severe enough to interfere with daily tasks. The hallmark symptom of Alzheimer's disease is trouble creating new memories, which makes it difficult to remember newly learned information. Other common problems include: changes to language, such as searching for words or substituting the wrong word; difficulty with multitasking or problem solving; feeling overwhelmed by decisions; getting lost; trouble identifying the date, time, or place; increasing irritability, particularly  "in the evenings; mood swings, depression or anxiety.     Alzheimer's disease is more common with age. Because it tends to onset much later in life, people with Alzheimer's disease typically live between four to eight years after diagnosis, although some people can live as long as 20 years depending on their age and other health factors. Symptoms do worsen over time, but the rate at which the disease progresses can vary greatly. Because Alzheimer's can onset so late in life, people often do not progress to the later stages of the disease.     Stages of Alzheimer's Disease     Early Stage: Alzheimer's disease is caused by brain changes (plaques and neurofibrillary tangles) that  think start as much as 20 years before symptoms become noticeable. In the beginning, or early stage, symptoms are typically mild and the person often still functions independently. They may continue to drive, work, and participate in other activities despite having memory lapses or occasionally forgetting familiar words.   Middle Stage: Middle stage Alzheimer's (also called "moderate") is typically the longest stage, and can last for many years. During this stage, a person's symptoms become more pronounced, and they start to need help with things like managing finances, medications, driving, or cooking. Their memory loss becomes denser, and the time they are able to recall things becomes shorter. They may begin to forget elements of their personal history, such as the names of grandchildren or what college they attended. They may begin to get frustrated or angry, or act in unexpected ways (such as refusing to bathe). Sometimes people can begin to wander or become lost. They may feel rodriguez or withdrawn, particularly in socially or mentally challenging situations. They are often disoriented, and unable to recall the date. Eventually, they may not be able to consistently identify where they are. They may become suspicious of others, or " demonstrate changes to personality. Some people will engage in repetitive behavior, such as hand wringing or tissue shredding. They often lose insight into their symptoms, and may begin to exhibit poor judgment (vulnerability to scams, not believing they need a walker and falling more frequently). Concern for unsafe behaviors increases during this stage (driving, leaving the stove on, poor medication compliance, declining hygiene, wandering). Often, people are no longer able to live alone, and require daily assistance. Typically, at this stage, people living with Alzheimer's can still participate in daily activities with daily assistance or oversight. For example, they may need help picking out clothes but can still dress themselves. They may need food to be prepared, but can feed themselves. They may be able to  after themselves, but would need help for more comprehensive housekeeping. As the need for more intensive care and oversight increases, caregivers may want to consider respite care or an adult day center so they can have a temporary break while the person living with Alzheimer's remains in a safe environment. Often, it is towards the end of the middle stage that caregivers begin to consider a higher level of care for their loved one. Risk for caregiver burnout in this stage increases, as their loved one's needs often begin to exceed what they are able to safely provide at home.   Late Stage: Symptoms in the final stage are severe. People generally lose their ability to respond to the environment, carry on a conversation, and, eventually, to control movement. They may still say words or phrases, but communication is difficult. Significant personality change is often evident. People in the late stage require round the clock assistance and supervision. They are often unaware of their surroundings, and do not recognize family members or caregivers. Due to the extensive degeneration in their brain, they  may begin to have physical problems with things like walking, sitting, and swallowing. They become more vulnerable to infections, such as pneumonia. At this point in the disease, the world is primarily experienced through the senses. Caregivers can express caring through touch, sound, sight, taste and smell. For example, try: Playing his or her favorite music; Reading portions of books that have meaning for the person; Looking at old photos together; Preparing a favorite food; Rubbing lotion with a favorite scent into the skin; Brushing the person's hair; Sitting outside together on a nice day.      Resources for Caregivers:   [employer] - [place] (https://www.alz.org/[place])  [facility] [place] (www.caregiver.org)  American Psychological Association (http://www.apa.org/pi/about/publications/caregivers/consumers/index.aspxconsumers/index.aspx).  The 36-Hour Day, sixth edition: The 36-Hour Day: A Family Guide to Caring for People Who Have Alzheimer Disease, Other Dementias, and Memory Loss, by hCaro Calderon           The MIND Diet: A Detailed Guide for Beginners    The MIND diet is designed to prevent dementia and loss of brain function as you age. It combines the Mediterranean diet and the DASH diet to create a dietary pattern that focuses specifically on brain health. This article is a detailed guide for beginners, with everything you need to know about the MIND diet and how to follow it.    What Is the MIND Diet?  MIND stands for the Mediterranean-DASH Intervention for Neurodegenerative Delay.    The MIND diet aims to reduce dementia and the decline in brain health that often occurs as people get older. It combines aspects of two very popular diets, the Mediterranean diet and the Dietary Approaches to Stop Hypertension (DASH) diet.    Many experts regard the Mediterranean and DASH diets as some of the healthiest. Research has shown they can lower blood pressure and reduce the risk  of heart disease, diabetes and several other diseases. But researchers wanted to create a diet specifically to help improve brain function and prevent dementia. To do this, they combined foods from the Mediterranean and DASH diets that had been shown to benefit brain health.    For example, both the Mediterranean and DASH diets recommend eating a lot of fruit. Fruit intake has not been correlated with improved brain function, but eating berries has been. Thus, the MIND diet encourages its followers to eat berries, but does not emphasize consuming fruit in general.     Currently, there are no set guidelines for how to follow the MIND diet. Simply eat more of the 10 foods the diet encourages you to eat, and eat less of the five foods the diet recommends you limit.    10 Foods to Eat on the MIND Diet    Green, leafy vegetables: Aim for six or more servings per week. This includes kale, spinach, cooked greens and salads.  All other vegetables: Try to eat another vegetable in addition to the green leafy vegetables at least once a day. It is best to choose non-starchy vegetables because they have a lot of nutrients with a low number of calories.  Berries: Eat berries at least twice a week. Although the published research only includes strawberries, you should also consume other berries like blueberries, raspberries and blackberries for their antioxidant benefits.  Nuts: Try to get five servings of nuts or more each week. The creators of the MIND diet dont specify what kind of nuts to consume, but it is probably best to vary the type of nuts you eat to obtain a variety of nutrients.  Olive oil: Use olive oil as your main cooking oil. Check out this article for information about the safety of cooking with olive oil.  Whole grains: Aim for at least three servings daily. Choose whole grains like oatmeal, quinoa, brown rice, whole-wheat pasta and 100% whole-wheat bread.  Fish: Eat fish at least once a week. It is best to  choose fatty fish like salmon, sardines, trout, tuna and mackerel for their high amounts of omega-3 fatty acids.  Beans: Include beans in at least four meals every week. This includes all beans, lentils and soybeans.  Poultry: Try to eat chicken or turkey at least twice a week. Note that fried chicken is not encouraged on the MIND diet.  Wine: Aim for no more than one glass daily. Both red and white wine may benefit the brain. However, much research has focused on the red wine compound resveratrol, which may help protect against Alzheimers disease.    If you are unable to consume the targeted amount of servings, dont quit the MIND diet altogether. Research has shown that following the MIND diet even a moderate amount is associated with a reduced risk of Alzheimers disease. When youre following the diet, you can eat more than just these 10 foods. However, the more you stick to the diet, the better your results may be.    5 Foods to Avoid on the MIND Diet    Butter and margarine: Try to eat less than 1 tablespoon (about 14 grams) daily. Instead, try using olive oil as your primary cooking fat, and dipping your bread in olive oil with herbs.  Cheese: The MIND diet recommends limiting your cheese consumption to less than once per week.  Red meat: Aim for no more than three servings each week. This includes all beef, pork, lamb and products made from these meats.  Fried food: The MIND diet highly discourages fried food, especially the kind from fast-food restaurants. Limit your consumption to less than once per week.  Pastries and sweets: This includes most of the processed junk food and desserts you can think of. Ice cream, cookies, brownies, snack cakes, donuts, candy and more. Try to limit these to no more than four times a week.    Researchers encourage limiting your consumption of these foods because they contain saturated fats and trans fats. Studies have found that trans fats are clearly associated with all  sorts of diseases, including heart disease and even Alzheimers disease. However, the health effects of saturated fat are widely debated in the nutrition world. Although the research on saturated fats and heart disease may be inconclusive and highly contested, animal research and observational studies in humans do suggest that consuming saturated fats in excess is associated with poor brain health.      The MIND Diet May Decrease Oxidative Stress and Inflammation  The current research on the MIND diet has not been able to show exactly how it works. However, the scientists who created the diet think it may work by reducing oxidative stress and inflammation. Oxidative stress occurs when unstable molecules called free radicals accumulate in the body in large quantities. This often causes damage to cells. The brain is especially vulnerable to this type of damage. Inflammation is your bodys natural response to injury and infection. But if its not properly regulated, inflammation can also be harmful and contribute to many chronic diseases.    Together, oxidative stress and inflammation can be quite detrimental to the brain. In recent years, theyve been the focus of some interventions to prevent and treat Alzheimers disease. Following the Mediterranean and DASH diets has been associated with lower levels of oxidative stress and inflammation. Because the MIND diet is a hybrid of these two diets, the foods that make up the MIND diet probably also have antioxidant and anti-inflammatory effects. The antioxidants in berries and the vitamin E in olive oil, green leafy vegetables and nuts are thought to benefit brain function by protecting the brain from oxidative stress. Additionally, the omega-3 fatty acids found in fatty fish are well-known for their ability to lower inflammation in the brain, and have been associated with slower loss of brain function.    The MIND Diet May Reduce Harmful Beta-Amyloid  Proteins  Beta-amyloid proteins are protein fragments found naturally in the body. However, they can accumulate and form plaques that build up in the brain, disrupting communication between brain cells and eventually leading to brain cell death. In fact, many scientists believe these plaques are one of the primary causes of Alzheimers disease. Animal and test-tube studies suggest that the antioxidants and vitamins that many MIND diet foods contain may help prevent the formation of beta-amyloid plaques in the brain.    Additionally, the MIND diet limits foods that contain saturated fats and trans fats, which studies have shown can increase beta-amyloid protein levels in mices brains. Human observational studies have found that consuming these fats was associated with a doubled risk of Alzheimers disease. However, it is important to note that this type of research is not able to determine cause and effect. Higher-quality, controlled studies are needed to discover exactly how the MIND diet may benefit brain health.    A Sample Meal Plan for One Week  Making meals for the MIND diet doesnt have to be complicated. Center your meals around the 10 foods and food groups that are encouraged on the diet, and try to stay away from the five foods that need to be limited.    Monday  Breakfast: Greek yogurt with raspberries, topped with sliced almonds.  Lunch: Mediterranean salad with olive-oil-based dressing, grilled chicken, whole-wheat sharon.  Dinner: Burrito bowl with brown rice, black beans, fajita vegetables, grilled chicken, salsa and guacamole.    Tuesday  Breakfast: Wheat toast with almond butter, scrambled eggs.  Lunch: Grilled chicken sandwich, blackberries, carrots.  Dinner: Grilled salmon, side salad with olive-oil-based dressing, brown rice.    Wednesday  Breakfast: Steel-cut oatmeal with strawberries, hard-boiled eggs.  Lunch: Mexican-style salad with mixed greens, black beans, red onion, corn, grilled chicken  and olive-oil-based dressing.  Dinner: Chicken and vegetable stir-mota, brown rice.    Thursday  Breakfast: Greek yogurt with peanut butter and banana.  Lunch: Baked trout, adonis greens, black-eyed peas.  Dinner: Whole-wheat spaghetti with turkey meatballs and marinara sauce, side salad with olive-oil-based dressing.    Friday  Breakfast: Wheat toast with avocado, omelet with peppers and onions.  Lunch: Chili made with ground turkey.  Dinner: Greek-seasoned baked chicken, oven-roasted potatoes, side salad, wheat dinner roll.    Saturday  Breakfast: Overnight oats with strawberries.  Lunch: Fish tacos on whole wheat tortillas, brown rice, hodges beans.  Dinner: Chicken gyro on whole-wheat sharon, cucumber and tomato salad.    Sunday  Breakfast: Spinach frittata, sliced apple and peanut butter.  Lunch: Tuna salad sandwich on wheat bread, plus carrots and celery with hummus.  Dinner: Stokes chicken, brown rice, lentils.  You can drink a glass of wine with each dinner to satisfy the MIND diet recommendations. Nuts can also make a great snack.

## 2025-07-29 NOTE — PROGRESS NOTES
NEUROPSYCHOLOGICAL EVALUATION FEEDBACK    Brent Alatorre attended a feedback session today accompanied by his wife. We discussed the results of the neuropsychological evaluation (45 minutes). Handouts provided in AVS. All of his questions were answered.    Also provided Mr. Alatorre's wife with a copy of the 36-Hour Day as a resource.     1. Major neurocognitive disorder due to multiple etiologies            PLAN:   RTC for f/u with Dr. Larry on 10/2/2025 at 2:30  Will place referral to get Ms. Alatorre reconnected with dementia care management team for caregiver support.   Pt should continue to not drive.  Family and friends encouraged to continue managing medications, finances, driving, meal preparation.         Romeo Early, Ph.D.  Postdoctoral Fellow in Clinical Neuropsychology  [facility] - Department of Neurology    Carie Gonzalez PsyD  Licensed Clinical Neuropsychologist  [facility] - Department of Neurology

## 2025-07-30 PROBLEM — F02.80 MAJOR NEUROCOGNITIVE DISORDER DUE TO MULTIPLE ETIOLOGIES: Status: ACTIVE | Noted: 2021-08-19

## 2025-07-31 ENCOUNTER — OFFICE VISIT (OUTPATIENT)
Dept: INTERNAL MEDICINE | Facility: CLINIC | Age: OVER 89
End: 2025-07-31
Payer: MEDICARE

## 2025-07-31 VITALS
BODY MASS INDEX: 20.55 KG/M2 | DIASTOLIC BLOOD PRESSURE: 62 MMHG | SYSTOLIC BLOOD PRESSURE: 110 MMHG | WEIGHT: 120.38 LBS | HEIGHT: 64 IN | HEART RATE: 96 BPM | OXYGEN SATURATION: 98 % | TEMPERATURE: 98 F

## 2025-07-31 DIAGNOSIS — D22.9 SKIN MOLE: ICD-10-CM

## 2025-07-31 DIAGNOSIS — H91.90 HOH (HARD OF HEARING): ICD-10-CM

## 2025-07-31 DIAGNOSIS — R10.12 CHRONIC LUQ PAIN: ICD-10-CM

## 2025-07-31 DIAGNOSIS — D50.9 IRON DEFICIENCY ANEMIA, UNSPECIFIED IRON DEFICIENCY ANEMIA TYPE: ICD-10-CM

## 2025-07-31 DIAGNOSIS — R63.4 WEIGHT LOSS: ICD-10-CM

## 2025-07-31 DIAGNOSIS — M81.0 AGE-RELATED OSTEOPOROSIS WITHOUT CURRENT PATHOLOGICAL FRACTURE: ICD-10-CM

## 2025-07-31 DIAGNOSIS — K76.0 HEPATIC STEATOSIS: ICD-10-CM

## 2025-07-31 DIAGNOSIS — N39.41 URGE INCONTINENCE OF URINE: ICD-10-CM

## 2025-07-31 DIAGNOSIS — Z00.00 ENCOUNTER FOR PREVENTIVE HEALTH EXAMINATION: Primary | ICD-10-CM

## 2025-07-31 DIAGNOSIS — M19.041 PRIMARY OSTEOARTHRITIS OF BOTH HANDS: ICD-10-CM

## 2025-07-31 DIAGNOSIS — I10 ESSENTIAL HYPERTENSION: Chronic | ICD-10-CM

## 2025-07-31 DIAGNOSIS — M19.042 PRIMARY OSTEOARTHRITIS OF BOTH HANDS: ICD-10-CM

## 2025-07-31 DIAGNOSIS — R54 FRAIL ELDERLY: ICD-10-CM

## 2025-07-31 DIAGNOSIS — G89.29 CHRONIC LUQ PAIN: ICD-10-CM

## 2025-07-31 DIAGNOSIS — D72.819 LEUKOPENIA, UNSPECIFIED TYPE: Chronic | ICD-10-CM

## 2025-07-31 DIAGNOSIS — N18.31 STAGE 3A CHRONIC KIDNEY DISEASE: Chronic | ICD-10-CM

## 2025-07-31 DIAGNOSIS — F02.80 MAJOR NEUROCOGNITIVE DISORDER DUE TO MULTIPLE ETIOLOGIES: ICD-10-CM

## 2025-07-31 PROBLEM — J18.9 PNEUMONIA DUE TO INFECTIOUS ORGANISM: Status: RESOLVED | Noted: 2024-12-20 | Resolved: 2025-07-31

## 2025-07-31 PROCEDURE — 99999 PR PBB SHADOW E&M-EST. PATIENT-LVL IV: CPT | Mod: PBBFAC,,, | Performed by: HOSPITALIST

## 2025-07-31 RX ORDER — ALENDRONATE SODIUM 70 MG/1
70 TABLET ORAL
Qty: 12 TABLET | Refills: 3 | Status: SHIPPED | OUTPATIENT
Start: 2025-07-31

## 2025-07-31 NOTE — PROGRESS NOTES
"Subjective:     @Patient ID: Brent Alatorre is a 89 y.o. male.    Chief Complaint: Annual Exam    HPI    89 yo male with DM2, HTN, CKD3, memory loss presents for annual. He is accompanied by his wife.       1. Memory: pt has f/u with neurology and neuropsychology. 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 No longer driving.   2. HTN: currently on amlodipine 2.5 mg qday, lisinopril 5 mg qday   3. Decreased appetite: reports ongoing for past few years. See below  4. B/l hearing loss: Has hearing aids now.    5. CKD3:    6. Right hand 3rd finger: in the past reported large "knot" of finger. Not painful currently. Had xray of this in May 2023. Showed soft tissue swelling.   7. Abdominal pain:has had episodes of left sided abdominal pain for years   Had abdominal u/s that showed fatty liver. Ct abd/pelvis in 2022 at [facility] was negative for acute issues and CT abd/pelvis in 2023 negative for acute issues. See below   8. Leukopenia: had f/u visit at [facility]. Seen by hematology for leukopenia. No further workup per them at that time  9. Osteoporosis: fosamax          ABDOMINAL PAIN:  He reports ongoing abdominal pain occurring every other day, localized to the left side. The pain is responsive to rubbing, which provides relief when applied. He has regular bowel movements without straining and denies additional abdominal pain or radiating symptoms. No changes in pain severity or character since previous evaluations have been reported.    MEMORY CONCERNS:  He reports memory concerns and was evaluated at a memory clinic. Clinicians suggested possible minor strokes or vascular issues affecting cognitive function. MRI at [facility] revealed a possible lacunar infarct, described as a tiny blood vessel stroke. He has ongoing concerns about potential neurological changes related to these findings.    NEAR SYNCOPE EPISODE:  Wife reports a near syncope episode while driving with his son. He experienced lightheadedness and was at risk of " losing consciousness, which resolved by the time they arrived home and he increased his fluid intake. States she felt he was dehydrated     WEIGHT MANAGEMENT:  He reports weight loss from 123 to 120 lbs since January. His eating habits consist primarily of fish and shrimp, with limited meat consumption. He does not eat vegetables, which he acknowledges is bothersome. He drinks Ensure supplements and reports improving appetite. He occasionally gets dry mouth and drinks water to compensate.    HEARING:  He recently had an appointment for hearing aids. Currently, one hearing aid is malfunctioning and being repaired. He verbalized commitment to wearing hearing aids as recommended, though his caregiver notes some resistance to consistent hearing aid use.    LABS / TEST RESULTS:  Liver tests and electrolytes are within normal limits. He has chronic mild anemia. Cholesterol panel is normal. Thyroid levels are normal. PSA is normal. Urinalysis shows trace blood without infection or proteinuria, to be monitored. Previous imaging workup including CTs in 2022 and 2023 were normal. [place] [facility] [place] showed no stroke. Ultrasound from prior evaluation showed fatty liver.      ROS:  Constitutional: +loss in appetite  ENT: +hearing loss, +dry mouth, +difficulty hearing  Gastrointestinal: +abdominal pain  Neurological: +near-syncope, -falling       Lipid disorders/ASCVD risk (ages >/= 45 or >/= 20 if increased risk ): ordered  DM (>45y yearly or if obese, HTN): A1c      Eye exam: utd yearly per wife   Colorectal Cancer (normal risk 50-75yr): Colonoscopy n/a  Prostate (per discussion with patient starting at 50y or 45y if high risk):    DEXA (F>66 yo, M >71yo, M&F 50-68 yo with risk factors (smoking, previous fx, wt <70kg; etoh abuse, chronic steroids, RA)):  utd in 2024      Vaccines:   Influenza (yearly) utd   Tetanus (every 10 yrs - 1st tdap) done  PPSV23(>66yo or <65 w/ lung dz, smoking, DM) done  PCV13 (> 65 or <65 w/  immunocompromised)done   Zoster (>61yo): utd      Exercise:  not much since pandemic, walking   Diet: regular    Review of Systems   Psychiatric/Behavioral:  Negative for agitation.      Past medical history, surgical history, and family medical history reviewed and updated as appropriate.    Medications and allergies reviewed.     Objective:     There were no vitals filed for this visit.  There is no height or weight on file to calculate BMI.  Physical Exam  Vitals reviewed.   Constitutional:       General: He is not in acute distress.     Appearance: He is well-developed.   HENT:      Head: Normocephalic and atraumatic.      Right Ear: Tympanic membrane normal.      Left Ear: Tympanic membrane normal.      Mouth/Throat:      Mouth: Mucous membranes are moist.      Pharynx: No oropharyngeal exudate.   Eyes:      General:         Right eye: No discharge.         Left eye: No discharge.      Conjunctiva/sclera: Conjunctivae normal.   Cardiovascular:      Rate and Rhythm: Normal rate and regular rhythm.      Heart sounds: No murmur heard.     No friction rub.   Pulmonary:      Effort: Pulmonary effort is normal.      Breath sounds: Normal breath sounds.   Abdominal:      General: Bowel sounds are normal. There is no distension.      Palpations: Abdomen is soft.      Tenderness: There is no abdominal tenderness.   Musculoskeletal:      Cervical back: Normal range of motion and neck supple.      Right lower leg: No edema.      Left lower leg: No edema.   Lymphadenopathy:      Cervical: No cervical adenopathy.   Skin:     General: Skin is warm and dry.      Comments: + mole of right upper neck - new per family. Also mole of Left ear and left upper abdomen. Also discoloration of R lower abdomen   Neurological:      Mental Status: He is alert.   Psychiatric:         Mood and Affect: Mood normal.         Behavior: Behavior normal.         Lab Results   Component Value Date    WBC 2.80 (L) 07/03/2025    HGB 11.0 (L)  07/03/2025    HCT 36.0 (L) 07/03/2025     07/03/2025    CHOL 160 07/03/2025    TRIG 52 07/03/2025    HDL 62 07/03/2025    ALT 13 07/03/2025    AST 16 07/03/2025     07/03/2025    K 4.7 07/03/2025     (H) 07/03/2025    CREATININE 1.3 07/03/2025    BUN 14 07/03/2025    CO2 19 (L) 07/03/2025    TSH 1.116 07/03/2025    PSA <0.01 07/03/2025    HGBA1C 5.6 06/19/2018       Assessment:     1. Encounter for preventive health examination    2. Essential hypertension    3. Iron deficiency anemia, unspecified iron deficiency anemia type    4. Stage 3a chronic kidney disease    5. Leukopenia, unspecified type    6. Major neurocognitive disorder due to multiple etiologies    7. Hepatic steatosis    8. Urge incontinence of urine    9. Primary osteoarthritis of both hands    10. [place] (hard of hearing)    11. Frail elderly    12. Skin mole    13. Chronic LUQ pain    14. Age-related osteoporosis without current pathological fracture    15. Weight loss      Plan:   Brent was seen today for annual exam.    Diagnoses and all orders for this visit:    Encounter for preventive health examination  - labs reviewed in clinic     Essential hypertension  - See below    -     Iron and TIBC; Future  -     Ferritin; Future  -     Transferrin; Future  -     Comprehensive Metabolic Panel; Future  -     Lipid Panel; Future  -     Microalbumin/Creatinine Ratio, Urine; Future  -     CBC W/ AUTO DIFFERENTIAL; Future  -     TSH; Future  -     Urinalysis Microscopic; Future    Iron deficiency anemia, unspecified iron deficiency anemia type  - Stable. Continue home meds     -     Iron and TIBC; Future  -     Ferritin; Future  -     Transferrin; Future  -     Comprehensive Metabolic Panel; Future  -     Lipid Panel; Future  -     Microalbumin/Creatinine Ratio, Urine; Future  -     CBC W/ AUTO DIFFERENTIAL; Future  -     TSH; Future  -     Urinalysis Microscopic; Future    Stage 3a chronic kidney disease  - see below  -     Iron and  TIBC; Future  -     Ferritin; Future  -     Transferrin; Future  -     Comprehensive Metabolic Panel; Future  -     Lipid Panel; Future  -     Microalbumin/Creatinine Ratio, Urine; Future  -     CBC W/ AUTO DIFFERENTIAL; Future  -     TSH; Future  -     Urinalysis Microscopic; Future    Leukopenia, unspecified type  - chronic. Continue to monitor   -     Iron and TIBC; Future  -     Ferritin; Future  -     Transferrin; Future  -     Comprehensive Metabolic Panel; Future  -     Lipid Panel; Future  -     Microalbumin/Creatinine Ratio, Urine; Future  -     CBC W/ AUTO DIFFERENTIAL; Future  -     TSH; Future  -     Urinalysis Microscopic; Future    Major neurocognitive disorder due to multiple etiologies   continue f/u with neurology   MRI at [facility] suggested possible lacunar infarct     Previous CT last year at [facility] did not show evidence of cerebrovascular accident.       -     Iron and TIBC; Future  -     Ferritin; Future  -     Transferrin; Future  -     Comprehensive Metabolic Panel; Future  -     Lipid Panel; Future  -     Microalbumin/Creatinine Ratio, Urine; Future  -     CBC W/ AUTO DIFFERENTIAL; Future  -     TSH; Future  -     Urinalysis Microscopic; Future    Hepatic steatosis  - see below   -     Iron and TIBC; Future  -     Ferritin; Future  -     Transferrin; Future  -     Comprehensive Metabolic Panel; Future  -     Lipid Panel; Future  -     Microalbumin/Creatinine Ratio, Urine; Future  -     CBC W/ AUTO DIFFERENTIAL; Future  -     TSH; Future  -     Urinalysis Microscopic; Future    Urge incontinence of urine  - Stable. Continue home meds     -     Iron and TIBC; Future  -     Ferritin; Future  -     Transferrin; Future  -     Comprehensive Metabolic Panel; Future  -     Lipid Panel; Future  -     Microalbumin/Creatinine Ratio, Urine; Future  -     CBC W/ AUTO DIFFERENTIAL; Future  -     TSH; Future  -     Urinalysis Microscopic; Future    Primary osteoarthritis of both hands  - stable. Continue to  monitor   -     Iron and TIBC; Future  -     Ferritin; Future  -     Transferrin; Future  -     Comprehensive Metabolic Panel; Future  -     Lipid Panel; Future  -     Microalbumin/Creatinine Ratio, Urine; Future  -     CBC W/ AUTO DIFFERENTIAL; Future  -     TSH; Future  -     Urinalysis Microscopic; Future    [place] (hard of hearing)  - see below  -     Iron and TIBC; Future  -     Ferritin; Future  -     Transferrin; Future  -     Comprehensive Metabolic Panel; Future  -     Lipid Panel; Future  -     Microalbumin/Creatinine Ratio, Urine; Future  -     CBC W/ AUTO DIFFERENTIAL; Future  -     TSH; Future  -     Urinalysis Microscopic; Future    Frail elderly  -     Iron and TIBC; Future  -     Ferritin; Future  -     Transferrin; Future  -     Comprehensive Metabolic Panel; Future  -     Lipid Panel; Future  -     Microalbumin/Creatinine Ratio, Urine; Future  -     CBC W/ AUTO DIFFERENTIAL; Future  -     TSH; Future  -     Urinalysis Microscopic; Future    Skin mole  - new of upper neck. Refer to derm  -     Ambulatory referral/consult to Dermatology; Future    Chronic LUQ pain  - chronic. Unclear etiology. Referral to gastro   -     Ambulatory referral/consult to Gastroenterology; Future    Age-related osteoporosis without current fracture   - Stable. Continue home meds   -     alendronate (FOSAMAX) 70 MG tablet; Take 1 tablet (70 mg total) by mouth every 7 days.    Weight loss   - see below      Assessment & Plan         CHRONIC LEFT UPPER QUADRANT PAIN:   Mr. Alatorre experiences pain every other day on the left side of the abdomen.   CT and ultrasound revealed fatty liver but no other cause for the pain.   Referred to Gastroenterology for further evaluation.   Advised to avoid NSAIDs and use acetaminophen for pain management.    ESSENTIAL HYPERTENSION:   Ordered BP check with nurse soon. If bp elevated at may need to resume low dose Rx   Scheduled nurse visit for blood pressure check and education on home  monitoring to assess need for medication.    WEIGHT LOSS:   Mr. Alatorre's weight decreased from 123 lbs in January to 120 lbs today.   Encouraged consumption of 1-2 Ensure shakes daily for nutritional support to prevent further weight loss.    HEARING LOSS:   Mr. Alatorre has hearing aids, one of which is malfunctioning and currently being repaired.    ANEMIA:   Mr. Alatorre has chronic anemia and is taking iron supplements.   Advised to continue this regimen.     FATTY LIVER:   chronic. Ultrasound has confirmed hepatic steatosis, but CTs were unremarkable.    CHRONIC KIDNEY DISEASE:   Mr. Alatorre has Stage III chronic kidney disease, stable    Explained importance of hydration for renal health and discussed role of BP control in managing CKD.   Advised to increase water intake to maintain hydration.    GENERAL HEALTH MANAGEMENT:   Explained importance of a balanced diet, including vegetables and fruits, for overall health.   Referred to Dermatology for assessment of skin moles.   Follow up in 6 months          This note was generated with the assistance of ambient listening technology. Verbal consent was obtained by the patient and accompanying visitor(s) for the recording of patient appointment to facilitate this note. I attest to having reviewed and edited the generated note for accuracy, though some syntax or spelling errors may persist. Please contact the author of this note for any clarification.       Lupe Lezama MD  Internal Medicine    7/31/2025    Visit time 40 min. Includes pre-charting, face-to-face encounter, medical decision making and documentation.

## 2025-08-06 ENCOUNTER — PATIENT OUTREACH (OUTPATIENT)
Dept: NEUROLOGY | Facility: CLINIC | Age: OVER 89
End: 2025-08-06
Payer: MEDICARE

## 2025-08-06 ENCOUNTER — OFFICE VISIT (OUTPATIENT)
Dept: GASTROENTEROLOGY | Facility: CLINIC | Age: OVER 89
End: 2025-08-06
Payer: MEDICARE

## 2025-08-06 VITALS
DIASTOLIC BLOOD PRESSURE: 76 MMHG | SYSTOLIC BLOOD PRESSURE: 135 MMHG | HEART RATE: 64 BPM | HEIGHT: 64 IN | BODY MASS INDEX: 20.82 KG/M2 | WEIGHT: 121.94 LBS

## 2025-08-06 DIAGNOSIS — R10.12 CHRONIC LUQ PAIN: ICD-10-CM

## 2025-08-06 DIAGNOSIS — G89.29 CHRONIC LUQ PAIN: ICD-10-CM

## 2025-08-06 PROCEDURE — 1101F PT FALLS ASSESS-DOCD LE1/YR: CPT | Mod: CPTII,GC,S$GLB, | Performed by: INTERNAL MEDICINE

## 2025-08-06 PROCEDURE — 1125F AMNT PAIN NOTED PAIN PRSNT: CPT | Mod: CPTII,GC,S$GLB, | Performed by: INTERNAL MEDICINE

## 2025-08-06 PROCEDURE — 1159F MED LIST DOCD IN RCRD: CPT | Mod: CPTII,GC,S$GLB, | Performed by: INTERNAL MEDICINE

## 2025-08-06 PROCEDURE — 3288F FALL RISK ASSESSMENT DOCD: CPT | Mod: CPTII,GC,S$GLB, | Performed by: INTERNAL MEDICINE

## 2025-08-06 PROCEDURE — 99204 OFFICE O/P NEW MOD 45 MIN: CPT | Mod: GC,S$GLB,, | Performed by: INTERNAL MEDICINE

## 2025-08-06 PROCEDURE — 1157F ADVNC CARE PLAN IN RCRD: CPT | Mod: CPTII,GC,S$GLB, | Performed by: INTERNAL MEDICINE

## 2025-08-06 PROCEDURE — 99999 PR PBB SHADOW E&M-EST. PATIENT-LVL III: CPT | Mod: PBBFAC,GC,,

## 2025-08-06 NOTE — PROGRESS NOTES
[facility] NOTE    Name: Brent Alatorre  : 1932  Date of Service: 2025   PCP: Lupe Lezama MD    Reason for visit:   Chief Complaint   Patient presents with    Abdominal Pain       HPI:   The patient is a 89-year-old mlae who presents to GI clinic for chronic LUQ abdominal pain. He has a history of  bladder cancer (s/p prostatectomy, bladder resection, lymph node dissection and partial colectomy in ), DM2, HTN, CKD3, and memory loss.  Patient is seen by PCP last week who initiated this GI referral.    Patient has had episodes of left sided mild abdominal pain for several years.  He has had abdominal u/s that showed fatty liver, CTAP in  and CTAP in  both negative for acute issues.  He denies any worsening of this pain, increasing frequency, changes in appetite, weight loss, change in bowel habits or associated symptoms such as nausea vomiting. States the pain is very mild, last approximately 10 minutes and goes away with massaging the area and occasional use of Tylenol.  This pain does not particularly bother him and both he and his wife state they would prefer minimal testing.    Most Recent Colonoscopy:   Colon 2013  Findings:       The entire examined colon appeared normal.   Recommendation:       - Repeat colonoscopy in 7 years for surveillance.                         - Patient has a contact number available for                         emergencies. The signs and symptoms of potential                         delayed complications were discussed with the                         patient. Return to normal activities tomorrow.                         Written discharge instructions were provided to the                         patient.                         - Continue present medications.                         - Return to previous diet indefinitely.     Review of Systems:   ROS - negative except for otherwise stated in HPI      Medications: Current  "Medications[1]     Past medical history, past surgical history, family history, allergies, and social history reviewed and updated in EMR.     Vitals:   Vitals:    08/06/25 1342   BP: 135/76   BP Location: Left arm   Patient Position: Sitting   Pulse: 64   Weight: 55.3 kg (121 lb 14.6 oz)   Height: 5' 4" (1.626 m)     Body mass index is 20.93 kg/m².    Physical Exam  Constitutional:  not in acute distress, appears well for stated age  HENT: Head: Normal, normocephalic.  Eyes: conjunctiva clear and sclera nonicteric  GI: soft, non-tender, non-distended   Skin: normal color on visualized skin  Neurological: alert, oriented    Assessment:   1. Chronic LUQ pain  Ambulatory referral/consult to Gastroenterology        Given his extensive workup in the past for this issue, stable symptoms with no red flags, favor no additional testing at this time.  Can continue with PRN Tylenol. Patient patient's wife in agreement with this plan.    Plan:   - no further testing at this time, return to clinic for any GI issues that should arise in the future    Discussed with staff attending. Attestation to follow.     Nita Morel MD  Gastroenterology Fellow PGY-V  [facility]         [1]   Current Outpatient Medications:     acetaminophen (TYLENOL) 500 MG tablet, Take 500 mg by mouth as needed., Disp: , Rfl:     alendronate (FOSAMAX) 70 MG tablet, Take 1 tablet (70 mg total) by mouth every 7 days., Disp: 12 tablet, Rfl: 3    ferrous sulfate 325 (65 FE) MG EC tablet, Take by mouth., Disp: , Rfl:     multivitamin capsule, Take 1 capsule by mouth once daily., Disp: , Rfl:     solifenacin (VESICARE) 10 MG tablet, Take 1 tablet (10 mg total) by mouth once daily., Disp: 90 tablet, Rfl: 3    albuterol (VENTOLIN HFA) 90 mcg/actuation inhaler, Inhale 2 puffs into the lungs every 4 (four) hours as needed for Wheezing. Rescue (Patient not taking: Reported on 1/10/2025), Disp: 18 g, Rfl: 0    clotrimazole (LOTRIMIN) 1 % cream, Apply " topically once daily. (Patient not taking: Reported on 8/6/2025), Disp: 60 g, Rfl: 1

## 2025-08-12 ENCOUNTER — PATIENT OUTREACH (OUTPATIENT)
Dept: NEUROLOGY | Facility: CLINIC | Age: OVER 89
End: 2025-08-12
Payer: MEDICARE

## 2025-08-26 ENCOUNTER — PATIENT OUTREACH (OUTPATIENT)
Dept: NEUROLOGY | Facility: CLINIC | Age: OVER 89
End: 2025-08-26
Payer: MEDICARE